# Patient Record
Sex: FEMALE | Race: WHITE
[De-identification: names, ages, dates, MRNs, and addresses within clinical notes are randomized per-mention and may not be internally consistent; named-entity substitution may affect disease eponyms.]

---

## 2018-02-14 ENCOUNTER — HOSPITAL ENCOUNTER (OUTPATIENT)
Age: 58
End: 2018-02-14
Payer: COMMERCIAL

## 2018-02-14 DIAGNOSIS — N95.1: ICD-10-CM

## 2018-02-14 DIAGNOSIS — Z12.4: Primary | ICD-10-CM

## 2018-02-14 LAB — PROGEST SERPL-MCNC: 0.11 NG/ML

## 2018-02-14 PROCEDURE — 36415 COLL VENOUS BLD VENIPUNCTURE: CPT

## 2018-02-14 PROCEDURE — 84144 ASSAY OF PROGESTERONE: CPT

## 2018-02-14 PROCEDURE — 88175 CYTOPATH C/V AUTO FLUID REDO: CPT

## 2018-02-14 PROCEDURE — 82670 ASSAY OF TOTAL ESTRADIOL: CPT

## 2018-02-14 PROCEDURE — G0145 SCR C/V CYTO,THINLAYER,RESCR: HCPCS

## 2018-03-12 ENCOUNTER — HOSPITAL ENCOUNTER (OUTPATIENT)
Age: 58
End: 2018-03-12
Payer: COMMERCIAL

## 2018-03-12 DIAGNOSIS — Z12.31: Primary | ICD-10-CM

## 2018-03-12 PROCEDURE — 77063 BREAST TOMOSYNTHESIS BI: CPT

## 2018-03-12 PROCEDURE — 77067 SCR MAMMO BI INCL CAD: CPT

## 2018-04-05 ENCOUNTER — HOSPITAL ENCOUNTER (OUTPATIENT)
Age: 58
End: 2018-04-05
Payer: COMMERCIAL

## 2018-04-05 DIAGNOSIS — Z80.41: ICD-10-CM

## 2018-04-05 DIAGNOSIS — R92.2: Primary | ICD-10-CM

## 2018-04-05 PROCEDURE — A4216 STERILE WATER/SALINE, 10 ML: HCPCS

## 2018-04-05 PROCEDURE — 77059: CPT

## 2018-04-05 PROCEDURE — C8908 MRI W/O FOL W/CONT, BREAST,: HCPCS

## 2018-04-05 PROCEDURE — A9585 GADOBUTROL INJECTION: HCPCS

## 2019-04-11 ENCOUNTER — HOSPITAL ENCOUNTER (OUTPATIENT)
Age: 59
End: 2019-04-11
Payer: COMMERCIAL

## 2019-04-11 DIAGNOSIS — Z12.31: Primary | ICD-10-CM

## 2019-04-11 PROCEDURE — 77067 SCR MAMMO BI INCL CAD: CPT

## 2019-04-11 PROCEDURE — 77063 BREAST TOMOSYNTHESIS BI: CPT

## 2020-07-29 ENCOUNTER — HOSPITAL ENCOUNTER (OUTPATIENT)
Age: 60
End: 2020-07-29
Payer: COMMERCIAL

## 2020-07-29 DIAGNOSIS — Z12.31: Primary | ICD-10-CM

## 2020-07-29 PROCEDURE — 77063 BREAST TOMOSYNTHESIS BI: CPT

## 2020-07-29 PROCEDURE — 77067 SCR MAMMO BI INCL CAD: CPT

## 2020-08-20 ENCOUNTER — HOSPITAL ENCOUNTER (OUTPATIENT)
Age: 60
End: 2020-08-20
Payer: COMMERCIAL

## 2020-08-20 DIAGNOSIS — Z78.0: Primary | ICD-10-CM

## 2020-08-20 PROCEDURE — 77080 DXA BONE DENSITY AXIAL: CPT

## 2021-08-11 ENCOUNTER — HOSPITAL ENCOUNTER (OUTPATIENT)
Age: 61
End: 2021-08-11
Payer: COMMERCIAL

## 2021-08-11 DIAGNOSIS — Z12.31: Primary | ICD-10-CM

## 2021-08-11 PROCEDURE — 77063 BREAST TOMOSYNTHESIS BI: CPT

## 2021-08-11 PROCEDURE — 77067 SCR MAMMO BI INCL CAD: CPT

## 2022-09-27 ENCOUNTER — HOSPITAL ENCOUNTER (OUTPATIENT)
Dept: HOSPITAL 100 - OPBD | Age: 62
Discharge: HOME | End: 2022-09-27
Payer: COMMERCIAL

## 2022-09-27 DIAGNOSIS — M85.89: ICD-10-CM

## 2022-09-27 DIAGNOSIS — Z78.0: ICD-10-CM

## 2022-09-27 DIAGNOSIS — M81.0: Primary | ICD-10-CM

## 2022-09-27 DIAGNOSIS — Z12.31: ICD-10-CM

## 2022-09-27 PROCEDURE — 77063 BREAST TOMOSYNTHESIS BI: CPT

## 2022-09-27 PROCEDURE — 77067 SCR MAMMO BI INCL CAD: CPT

## 2022-09-27 PROCEDURE — 77080 DXA BONE DENSITY AXIAL: CPT

## 2023-02-10 PROBLEM — S83.241A TEAR OF MEDIAL MENISCUS OF RIGHT KNEE: Status: ACTIVE | Noted: 2023-02-10

## 2023-02-10 PROBLEM — F32.1 DEPRESSION, MAJOR, SINGLE EPISODE, MODERATE (MULTI): Status: ACTIVE | Noted: 2023-02-10

## 2023-02-10 PROBLEM — M54.12 CERVICAL RADICULITIS: Status: ACTIVE | Noted: 2023-02-10

## 2023-02-10 PROBLEM — N63.0 BREAST LUMP OR MASS: Status: ACTIVE | Noted: 2023-02-10

## 2023-02-10 PROBLEM — J30.9 CHRONIC ALLERGIC RHINITIS: Status: ACTIVE | Noted: 2023-02-10

## 2023-02-10 PROBLEM — M81.0 OSTEOPOROSIS: Status: ACTIVE | Noted: 2023-02-10

## 2023-02-10 PROBLEM — R07.2 PRECORDIAL PAIN: Status: ACTIVE | Noted: 2023-02-10

## 2023-02-10 PROBLEM — I10 BENIGN HYPERTENSION: Status: ACTIVE | Noted: 2023-02-10

## 2023-02-10 PROBLEM — K21.9 GERD (GASTROESOPHAGEAL REFLUX DISEASE): Status: ACTIVE | Noted: 2023-02-10

## 2023-02-10 PROBLEM — K90.9 STEATORRHEA (HHS-HCC): Status: ACTIVE | Noted: 2023-02-10

## 2023-02-10 PROBLEM — R31.9 HEMATURIA: Status: ACTIVE | Noted: 2023-02-10

## 2023-02-10 PROBLEM — K58.0 IRRITABLE BOWEL SYNDROME WITH DIARRHEA: Status: ACTIVE | Noted: 2023-02-10

## 2023-02-10 PROBLEM — I34.1 MVP (MITRAL VALVE PROLAPSE): Status: ACTIVE | Noted: 2023-02-10

## 2023-02-10 PROBLEM — M47.812 CERVICAL SPONDYLOSIS WITHOUT MYELOPATHY: Status: ACTIVE | Noted: 2023-02-10

## 2023-02-10 PROBLEM — M50.20 DISPLACEMENT OF CERVICAL INTERVERTEBRAL DISC WITHOUT MYELOPATHY: Status: ACTIVE | Noted: 2023-02-10

## 2023-02-10 PROBLEM — M70.61 GREATER TROCHANTERIC BURSITIS OF RIGHT HIP: Status: ACTIVE | Noted: 2023-02-10

## 2023-02-10 PROBLEM — M25.551 HIP PAIN, RIGHT: Status: ACTIVE | Noted: 2023-02-10

## 2023-02-10 PROBLEM — G47.00 INSOMNIA: Status: ACTIVE | Noted: 2023-02-10

## 2023-02-10 PROBLEM — M75.41 IMPINGEMENT SYNDROME OF RIGHT SHOULDER: Status: ACTIVE | Noted: 2023-02-10

## 2023-02-10 PROBLEM — M75.22 BICEPS TENDINITIS OF LEFT UPPER EXTREMITY: Status: ACTIVE | Noted: 2023-02-10

## 2023-02-10 PROBLEM — M25.512 LEFT SHOULDER PAIN: Status: ACTIVE | Noted: 2023-02-10

## 2023-02-10 PROBLEM — M25.562 ACUTE PAIN OF LEFT KNEE: Status: ACTIVE | Noted: 2023-02-10

## 2023-02-10 PROBLEM — M15.9 GENERALIZED OSTEOARTHRITIS OF MULTIPLE SITES: Status: ACTIVE | Noted: 2023-02-10

## 2023-02-10 PROBLEM — M54.50 LOW BACK PAIN: Status: ACTIVE | Noted: 2023-02-10

## 2023-02-10 PROBLEM — M54.2 CERVICALGIA: Status: ACTIVE | Noted: 2023-02-10

## 2023-02-10 PROBLEM — M47.816 LUMBAR SPONDYLOSIS: Status: ACTIVE | Noted: 2023-02-10

## 2023-02-10 PROBLEM — I78.1 SPIDER VEINS OF LIMB: Status: ACTIVE | Noted: 2023-02-10

## 2023-02-10 PROBLEM — E03.9 HYPOTHYROIDISM: Status: ACTIVE | Noted: 2023-02-10

## 2023-02-10 PROBLEM — M79.10 MYALGIA: Status: ACTIVE | Noted: 2023-02-10

## 2023-02-10 PROBLEM — M67.52 SYNOVIAL PLICA SYNDROME OF LEFT KNEE: Status: ACTIVE | Noted: 2023-02-10

## 2023-02-10 PROBLEM — F41.9 ANXIETY: Status: ACTIVE | Noted: 2023-02-10

## 2023-02-10 PROBLEM — R10.12 ABDOMINAL PAIN, ACUTE, LEFT UPPER QUADRANT: Status: ACTIVE | Noted: 2023-02-10

## 2023-02-10 PROBLEM — G89.29 OTHER CHRONIC PAIN: Status: ACTIVE | Noted: 2023-02-10

## 2023-02-10 PROBLEM — C44.91 BASAL CELL CARCINOMA: Status: ACTIVE | Noted: 2023-02-10

## 2023-02-10 PROBLEM — R30.0 BURNING WITH URINATION: Status: ACTIVE | Noted: 2023-02-10

## 2023-02-10 RX ORDER — CALCIUM CARBONATE/VITAMIN D3 600MG-5MCG
1 TABLET ORAL DAILY
COMMUNITY
Start: 2019-12-02

## 2023-02-10 RX ORDER — IBANDRONATE SODIUM 150 MG/1
150 TABLET, FILM COATED ORAL
COMMUNITY
Start: 2022-10-18 | End: 2023-09-19 | Stop reason: SDUPTHER

## 2023-02-10 RX ORDER — ELECTROLYTES/DEXTROSE
5 SOLUTION, ORAL ORAL DAILY
COMMUNITY

## 2023-02-10 RX ORDER — MELOXICAM 15 MG/1
15 TABLET ORAL DAILY
COMMUNITY
Start: 2021-02-10 | End: 2023-03-14 | Stop reason: SDUPTHER

## 2023-02-10 RX ORDER — HYDROCODONE BITARTRATE AND ACETAMINOPHEN 5; 325 MG/1; MG/1
1 TABLET ORAL 3 TIMES DAILY PRN
COMMUNITY
Start: 2019-10-10 | End: 2023-03-28 | Stop reason: SDUPTHER

## 2023-02-10 RX ORDER — LISINOPRIL 5 MG/1
5 TABLET ORAL DAILY
COMMUNITY
Start: 2020-10-20 | End: 2023-06-22 | Stop reason: SDUPTHER

## 2023-02-10 RX ORDER — BACLOFEN 10 MG/1
10 TABLET ORAL 3 TIMES DAILY
COMMUNITY
Start: 2021-10-21 | End: 2024-01-04 | Stop reason: SDUPTHER

## 2023-02-10 RX ORDER — ZOLPIDEM TARTRATE 5 MG/1
5 TABLET ORAL NIGHTLY PRN
COMMUNITY
Start: 2019-10-30 | End: 2023-03-28 | Stop reason: SDUPTHER

## 2023-02-10 RX ORDER — MULTIVITAMIN
1 TABLET ORAL DAILY
COMMUNITY
Start: 2019-12-02

## 2023-02-10 RX ORDER — LEVOTHYROXINE SODIUM 100 UG/1
100 TABLET ORAL DAILY
COMMUNITY
Start: 2019-10-02 | End: 2023-06-22 | Stop reason: SDUPTHER

## 2023-02-10 RX ORDER — NALOXONE HYDROCHLORIDE 4 MG/.1ML
4 SPRAY NASAL AS NEEDED
COMMUNITY
Start: 2020-04-27

## 2023-03-14 ENCOUNTER — TELEPHONE (OUTPATIENT)
Dept: PRIMARY CARE | Facility: CLINIC | Age: 63
End: 2023-03-14
Payer: COMMERCIAL

## 2023-03-14 DIAGNOSIS — M25.551 HIP PAIN, RIGHT: ICD-10-CM

## 2023-03-14 RX ORDER — SERTRALINE HYDROCHLORIDE 50 MG/1
50 TABLET, FILM COATED ORAL DAILY
COMMUNITY
Start: 2022-04-04 | End: 2023-04-11 | Stop reason: ALTCHOICE

## 2023-03-14 RX ORDER — PANTOPRAZOLE SODIUM 40 MG/1
1 TABLET, DELAYED RELEASE ORAL DAILY
COMMUNITY
Start: 2022-03-25 | End: 2023-04-11 | Stop reason: ALTCHOICE

## 2023-03-14 RX ORDER — MINERAL OIL
1 ENEMA (ML) RECTAL DAILY PRN
COMMUNITY

## 2023-03-14 RX ORDER — RIFAXIMIN 550 MG/1
550 TABLET ORAL 3 TIMES DAILY
COMMUNITY
Start: 2022-10-18 | End: 2023-08-02 | Stop reason: ALTCHOICE

## 2023-03-20 RX ORDER — MELOXICAM 15 MG/1
15 TABLET ORAL DAILY
Qty: 30 TABLET | Refills: 2 | Status: SHIPPED | OUTPATIENT
Start: 2023-03-20 | End: 2023-06-08 | Stop reason: SDUPTHER

## 2023-03-28 ENCOUNTER — TELEPHONE (OUTPATIENT)
Dept: PRIMARY CARE | Facility: CLINIC | Age: 63
End: 2023-03-28
Payer: COMMERCIAL

## 2023-03-28 DIAGNOSIS — G47.00 INSOMNIA, UNSPECIFIED TYPE: ICD-10-CM

## 2023-03-28 DIAGNOSIS — M54.2 CERVICALGIA: ICD-10-CM

## 2023-03-28 RX ORDER — ZOLPIDEM TARTRATE 5 MG/1
5 TABLET ORAL NIGHTLY PRN
Qty: 30 TABLET | Refills: 0 | Status: SHIPPED | OUTPATIENT
Start: 2023-03-28 | End: 2023-04-24 | Stop reason: SDUPTHER

## 2023-03-28 RX ORDER — HYDROCODONE BITARTRATE AND ACETAMINOPHEN 5; 325 MG/1; MG/1
1 TABLET ORAL 3 TIMES DAILY PRN
Qty: 90 TABLET | Refills: 0 | Status: SHIPPED | OUTPATIENT
Start: 2023-03-31 | End: 2023-03-29 | Stop reason: SDUPTHER

## 2023-03-28 NOTE — TELEPHONE ENCOUNTER
Pharmacy called to report that Norco is on back order- would you like to prescribe something else or send to a different pharmacy?

## 2023-03-29 ENCOUNTER — TELEPHONE (OUTPATIENT)
Dept: PRIMARY CARE | Facility: CLINIC | Age: 63
End: 2023-03-29
Payer: COMMERCIAL

## 2023-03-29 DIAGNOSIS — M54.2 CERVICALGIA: ICD-10-CM

## 2023-03-29 RX ORDER — HYDROCODONE BITARTRATE AND ACETAMINOPHEN 5; 325 MG/1; MG/1
1 TABLET ORAL 3 TIMES DAILY PRN
Qty: 90 TABLET | Refills: 0 | Status: SHIPPED | OUTPATIENT
Start: 2023-03-31 | End: 2023-04-28 | Stop reason: SDUPTHER

## 2023-04-11 ENCOUNTER — OFFICE VISIT (OUTPATIENT)
Dept: PRIMARY CARE | Facility: CLINIC | Age: 63
End: 2023-04-11
Payer: COMMERCIAL

## 2023-04-11 VITALS
SYSTOLIC BLOOD PRESSURE: 130 MMHG | HEIGHT: 64 IN | BODY MASS INDEX: 22.53 KG/M2 | OXYGEN SATURATION: 98 % | HEART RATE: 72 BPM | DIASTOLIC BLOOD PRESSURE: 80 MMHG | WEIGHT: 132 LBS

## 2023-04-11 DIAGNOSIS — I10 BENIGN HYPERTENSION: ICD-10-CM

## 2023-04-11 DIAGNOSIS — F41.9 ANXIETY: Primary | ICD-10-CM

## 2023-04-11 DIAGNOSIS — K21.9 GASTROESOPHAGEAL REFLUX DISEASE WITHOUT ESOPHAGITIS: ICD-10-CM

## 2023-04-11 DIAGNOSIS — M54.12 CERVICAL RADICULITIS: ICD-10-CM

## 2023-04-11 PROCEDURE — 1036F TOBACCO NON-USER: CPT | Performed by: FAMILY MEDICINE

## 2023-04-11 PROCEDURE — 3079F DIAST BP 80-89 MM HG: CPT | Performed by: FAMILY MEDICINE

## 2023-04-11 PROCEDURE — 3075F SYST BP GE 130 - 139MM HG: CPT | Performed by: FAMILY MEDICINE

## 2023-04-11 PROCEDURE — 99214 OFFICE O/P EST MOD 30 MIN: CPT | Performed by: FAMILY MEDICINE

## 2023-04-11 ASSESSMENT — PATIENT HEALTH QUESTIONNAIRE - PHQ9
2. FEELING DOWN, DEPRESSED OR HOPELESS: SEVERAL DAYS
SUM OF ALL RESPONSES TO PHQ9 QUESTIONS 1 AND 2: 1
1. LITTLE INTEREST OR PLEASURE IN DOING THINGS: NOT AT ALL

## 2023-04-11 NOTE — PATIENT INSTRUCTIONS
Try Nexium 20 for a couple weeks to see if it helps the burping     The chest pain is costochondritis and is not the heart

## 2023-04-11 NOTE — PROGRESS NOTES
Subjective   Patient ID: Caitlin Hogan is a 62 y.o. female who presents for Med Management (Burping a lot, low back left side pain).    HPI   IBS/ Steatorrhea - Celiac panel negative. Bowels are still messed up in that she has loose stools in the AM up to 5 per day. Cramps Coffee will cause this. CT was normal except for the diverticulosis. Scope in 2017. No blood. Cologuard was negative. Getting fiber. Bloated and cramping lower abdomen. Aggravated by Zithromax for the URI. She saw Dr Gonsales and has started Papaya. Did not help. But doing better now. Rifaximin for a couple weeks did help but still comes and goes. She was on Protonix per Dr Lorenz. Helped the stomach feel some better. Lots of stress with brother dying and being executor. A sister  and another brother has cancer. She would like to get the colonoscopy now.     Anxiety and insomnia has been up with above and kids overseas with . Still daily zolpidem. Helps along with Melatonin. Helps to shut down Several family members with cancer in the last 5 years of bladder, colon and ovary so has concerns for herself. All were around 60 years old. Several with colon cancer. She is tired a lot for months. Mentally exhausted. Loud at work and does not feel she is getting support with difficult kids . Was on Wellbutrin but did not help. Feels she has been losing weight. Down 7 pound over a year and now stable.  Feels this contributes to the neck pain.     Basal cell carcinoma of nose - no skin lesions. Dr Harris booth a couple. Annual visits. Has a few he is observing. Chemo cream now for spots on chest and face.     Biceps tendonitis on left - still pain when she lays on it.     Cervicalgia - she is back at work and worse. Also with traveling. Had injections multiple times. She was miserable at the last visit. MICHELE helped for 3 weeks.   Painful at end of day and headaches. Has had therapy and injections. Bike riding aggravates. Pain to shoulder on  left. MRI in 2017 showed moderate C5 foramen stenosis. Otherwise mild steniosis at multiple levels.  Has been to Dr White for injections several years ago. And then to Dr Doe and had injection in 2019. She is to have a series of injections based on MRI in 2021 showing again multilevel cervical spondylosis and stenosis with Dr Cano   A recent MRI on 2/14/23 did not show anything more than moderate stenosis at C5.  Did one injection and ablation. They have started to discuss  surgery. Has had therapy several times through the years with not relief. . Wants to try to decrease her meds so did drop back to 3 per day. Pain level 2-10. Usually at 4 - 5 and drops 2 for 4-5 hours. Allows her to do house and lawn and garden.  And walking.  Would like to ride a motorcycle, gardening, ride longer distances,  kayak and gardening. Has lost muscle mass and concerned.     Chronic rhinitis - running nose when outside is cold and with pollen in summer. Allegra helps.    Depressive disorder - worries as above. No meds. But will treat the anxiety. Sertraline 25 does not feel like it is doing anything so went off of it. She is getting forgetful. PHQ- 9 score of 5 last year. Not suicidal. Feels she need to see a counselor and will consider when less active.     Family history of multiple cancers in sibling. So she had genetics eval with Dr Munoz and no inherited disorder.     Generalized osteoarthrosis, involving multiple sites -Knee feels like the patella scrapes at times. Pain in the right hip over greater trochanter. Has had the bursa removed. Did help but now pain again and not sure if the bursa recurrence or from the back. All positional and activity related. Not every day. Was with Dr Tenorio. She is to retire this year.     Hypothyroidism NOS - Good TSH in September on current dose     Insomnia - gets to sleep and stays asleep for 6 hours Ambien and back on Melatonin. Wakes with less anxiety     Low back pain worse now that  she is active again and driving. Worse after mowing.  Pain down the left leg. She says she walked 2 miles. Has been about a year XRay unchanged. . Has not  been to therapy.  so may now be able to get the MRI . Intermittent pain Norco for pain helps some. No numbness or weakness. Mid back hurts after standing. Dr Cano is not addressing this at present. So will discuss this one as well      Lump or mass in breast - good Mammogram in September. Also Pap last fall. Had dysuria that went away as did the blood.     Menopause - no hot flashes.     MVP (mitral valve prolapse) - Fatigue in the chest at times better with BP control. Not pain. Short of breath at times. Flutters at times with anxiety. . No edema.     Osteopenia - on DEXA 9/29/22. She is on Vitamin D and Calcium. Not able to get Prolia. FRAX is 15/6%. Ibandronate now.     Pancreatitis - nothing recent. Buts ays she has  been belching a lot lately and has a pain in left chest when she tips to the side. No heartburn.  No dysphagia..  No blood in stools. Will try Nexium     Spider veins of limb - not sensitive     Tear of medial meniscus of right knee -Dr Tenorio. Still needs to address as more bothersome. Also has a plica and will try Voltaren Gel.     Former smoker.     Left foot pain top with walking. No deformity     I have personally reviewed the patients OARRS report. . This report is filed in the EHR. I have considered the risks of abuse, addiction and diversion. I believe it is clinically appropriate to continue to prescribe this medication. April 23.     UDS 10/20/20 No Norco as cutting back 12/9/21 as expected for medication profile1/13/23 as expected for medication profile   1  CSA 1/13/23for Ambien  CSA 4/11/23  for Norco   ORT 4/27/20 score 2  Mammogram 9/27/22  scope 3/1/17 and with [facility] should go 5 years in 2022. Cologuard 8/4/21 negative     Review of Systems    Objective   /80 (BP Location: Left arm, Patient Position: Sitting)   Pulse 72    "Ht 1.626 m (5' 4\")   Wt 59.9 kg (132 lb)   SpO2 98%   BMI 22.66 kg/m²     Physical Exam  Vitals reviewed.   Constitutional:       Appearance: Normal appearance.   HENT:      Head: Normocephalic.   Eyes:      Extraocular Movements: Extraocular movements intact.      Conjunctiva/sclera: Conjunctivae normal.      Pupils: Pupils are equal, round, and reactive to light.   Neck:      Vascular: No carotid bruit.   Cardiovascular:      Rate and Rhythm: Normal rate and regular rhythm.      Heart sounds: No murmur heard.  Pulmonary:      Effort: Pulmonary effort is normal.      Breath sounds: Normal breath sounds.   Chest:      Chest wall: No tenderness (left upper chest wall anteriorly).   Abdominal:      General: Abdomen is flat. Bowel sounds are normal.      Palpations: Abdomen is soft.      Tenderness: There is abdominal tenderness (epigastrium). There is no guarding.   Musculoskeletal:      Cervical back: Normal range of motion and neck supple. No tenderness.      Comments: Low back with tender muscles but good ROM  Neck with good ROM but tender and tight traps and paracervical  Mid  tight muscles      Lymphadenopathy:      Cervical: No cervical adenopathy.   Neurological:      Mental Status: She is alert and oriented to person, place, and time.   Psychiatric:         Mood and Affect: Mood normal.         Behavior: Behavior normal.         Thought Content: Thought content normal.         Judgment: Judgment normal.         Assessment/Plan          "

## 2023-04-24 DIAGNOSIS — G47.00 INSOMNIA, UNSPECIFIED TYPE: ICD-10-CM

## 2023-04-24 RX ORDER — ZOLPIDEM TARTRATE 5 MG/1
5 TABLET ORAL NIGHTLY PRN
Qty: 30 TABLET | Refills: 0 | Status: SHIPPED | OUTPATIENT
Start: 2023-04-24 | End: 2023-05-24 | Stop reason: SDUPTHER

## 2023-04-28 ENCOUNTER — TELEPHONE (OUTPATIENT)
Dept: PRIMARY CARE | Facility: CLINIC | Age: 63
End: 2023-04-28
Payer: COMMERCIAL

## 2023-04-28 DIAGNOSIS — M54.2 CERVICALGIA: ICD-10-CM

## 2023-04-28 RX ORDER — HYDROCODONE BITARTRATE AND ACETAMINOPHEN 5; 325 MG/1; MG/1
1 TABLET ORAL 3 TIMES DAILY PRN
Qty: 90 TABLET | Refills: 0 | Status: SHIPPED | OUTPATIENT
Start: 2023-04-28 | End: 2023-05-24 | Stop reason: SDUPTHER

## 2023-05-24 DIAGNOSIS — G47.00 INSOMNIA, UNSPECIFIED TYPE: ICD-10-CM

## 2023-05-24 DIAGNOSIS — M54.2 CERVICALGIA: ICD-10-CM

## 2023-05-24 RX ORDER — ZOLPIDEM TARTRATE 5 MG/1
5 TABLET ORAL NIGHTLY PRN
Qty: 30 TABLET | Refills: 0 | Status: SHIPPED | OUTPATIENT
Start: 2023-05-24 | End: 2023-06-22 | Stop reason: SDUPTHER

## 2023-05-24 RX ORDER — HYDROCODONE BITARTRATE AND ACETAMINOPHEN 5; 325 MG/1; MG/1
1 TABLET ORAL 3 TIMES DAILY PRN
Qty: 90 TABLET | Refills: 0 | Status: SHIPPED | OUTPATIENT
Start: 2023-05-24 | End: 2023-06-22 | Stop reason: SDUPTHER

## 2023-06-05 DIAGNOSIS — M25.551 HIP PAIN, RIGHT: Primary | ICD-10-CM

## 2023-06-05 RX ORDER — MELOXICAM 15 MG/1
TABLET ORAL
Qty: 30 TABLET | Refills: 2 | OUTPATIENT
Start: 2023-06-05

## 2023-06-08 RX ORDER — MELOXICAM 15 MG/1
15 TABLET ORAL DAILY
Qty: 30 TABLET | Refills: 2 | Status: SHIPPED | OUTPATIENT
Start: 2023-06-08 | End: 2023-09-06 | Stop reason: SDUPTHER

## 2023-06-22 DIAGNOSIS — E03.9 HYPOTHYROIDISM, UNSPECIFIED TYPE: ICD-10-CM

## 2023-06-22 DIAGNOSIS — G47.00 INSOMNIA, UNSPECIFIED TYPE: ICD-10-CM

## 2023-06-22 DIAGNOSIS — I10 BENIGN HYPERTENSION: ICD-10-CM

## 2023-06-22 DIAGNOSIS — M54.2 CERVICALGIA: ICD-10-CM

## 2023-06-22 RX ORDER — ZOLPIDEM TARTRATE 5 MG/1
5 TABLET ORAL NIGHTLY PRN
Qty: 30 TABLET | Refills: 0 | Status: SHIPPED | OUTPATIENT
Start: 2023-06-22 | End: 2023-07-25 | Stop reason: SDUPTHER

## 2023-06-22 RX ORDER — LEVOTHYROXINE SODIUM 100 UG/1
100 TABLET ORAL DAILY
Qty: 90 TABLET | Refills: 1 | Status: SHIPPED | OUTPATIENT
Start: 2023-06-22 | End: 2024-01-12 | Stop reason: SDUPTHER

## 2023-06-22 RX ORDER — HYDROCODONE BITARTRATE AND ACETAMINOPHEN 5; 325 MG/1; MG/1
1 TABLET ORAL 3 TIMES DAILY PRN
Qty: 90 TABLET | Refills: 0 | Status: SHIPPED | OUTPATIENT
Start: 2023-06-22 | End: 2023-07-21 | Stop reason: SDUPTHER

## 2023-06-22 RX ORDER — LISINOPRIL 5 MG/1
5 TABLET ORAL DAILY
Qty: 90 TABLET | Refills: 1 | Status: SHIPPED | OUTPATIENT
Start: 2023-06-22 | End: 2024-01-12 | Stop reason: SDUPTHER

## 2023-06-30 ENCOUNTER — HOSPITAL ENCOUNTER (OUTPATIENT)
Dept: DATA CONVERSION | Facility: HOSPITAL | Age: 63
End: 2023-06-30
Attending: PAIN MEDICINE | Admitting: PAIN MEDICINE
Payer: COMMERCIAL

## 2023-06-30 DIAGNOSIS — M81.0 AGE-RELATED OSTEOPOROSIS WITHOUT CURRENT PATHOLOGICAL FRACTURE: ICD-10-CM

## 2023-06-30 DIAGNOSIS — Z85.828 PERSONAL HISTORY OF OTHER MALIGNANT NEOPLASM OF SKIN: ICD-10-CM

## 2023-06-30 DIAGNOSIS — M54.50 LOW BACK PAIN, UNSPECIFIED: ICD-10-CM

## 2023-06-30 DIAGNOSIS — R52 PAIN, UNSPECIFIED: ICD-10-CM

## 2023-06-30 DIAGNOSIS — F41.9 ANXIETY DISORDER, UNSPECIFIED: ICD-10-CM

## 2023-06-30 DIAGNOSIS — G47.00 INSOMNIA, UNSPECIFIED: ICD-10-CM

## 2023-06-30 DIAGNOSIS — M51.16 INTERVERTEBRAL DISC DISORDERS WITH RADICULOPATHY, LUMBAR REGION: ICD-10-CM

## 2023-06-30 DIAGNOSIS — Z90.710 ACQUIRED ABSENCE OF BOTH CERVIX AND UTERUS: ICD-10-CM

## 2023-06-30 DIAGNOSIS — I34.1 NONRHEUMATIC MITRAL (VALVE) PROLAPSE: ICD-10-CM

## 2023-06-30 DIAGNOSIS — M48.062 SPINAL STENOSIS, LUMBAR REGION WITH NEUROGENIC CLAUDICATION: ICD-10-CM

## 2023-06-30 DIAGNOSIS — M54.16 RADICULOPATHY, LUMBAR REGION: ICD-10-CM

## 2023-06-30 DIAGNOSIS — F32.A DEPRESSION, UNSPECIFIED: ICD-10-CM

## 2023-06-30 DIAGNOSIS — Z87.891 PERSONAL HISTORY OF NICOTINE DEPENDENCE: ICD-10-CM

## 2023-06-30 DIAGNOSIS — I10 ESSENTIAL (PRIMARY) HYPERTENSION: ICD-10-CM

## 2023-06-30 DIAGNOSIS — M51.36 OTHER INTERVERTEBRAL DISC DEGENERATION, LUMBAR REGION: ICD-10-CM

## 2023-06-30 DIAGNOSIS — M79.10 MYALGIA, UNSPECIFIED SITE: ICD-10-CM

## 2023-06-30 DIAGNOSIS — K21.9 GASTRO-ESOPHAGEAL REFLUX DISEASE WITHOUT ESOPHAGITIS: ICD-10-CM

## 2023-07-21 DIAGNOSIS — M54.2 CERVICALGIA: ICD-10-CM

## 2023-07-21 RX ORDER — HYDROCODONE BITARTRATE AND ACETAMINOPHEN 5; 325 MG/1; MG/1
1 TABLET ORAL 3 TIMES DAILY PRN
Qty: 90 TABLET | Refills: 0 | Status: SHIPPED | OUTPATIENT
Start: 2023-07-23 | End: 2023-08-21 | Stop reason: SDUPTHER

## 2023-07-25 ENCOUNTER — APPOINTMENT (OUTPATIENT)
Dept: PRIMARY CARE | Facility: CLINIC | Age: 63
End: 2023-07-25
Payer: COMMERCIAL

## 2023-07-25 DIAGNOSIS — G47.00 INSOMNIA, UNSPECIFIED TYPE: ICD-10-CM

## 2023-07-25 RX ORDER — ZOLPIDEM TARTRATE 5 MG/1
5 TABLET ORAL NIGHTLY PRN
Qty: 30 TABLET | Refills: 0 | Status: SHIPPED | OUTPATIENT
Start: 2023-07-25 | End: 2023-08-21 | Stop reason: SDUPTHER

## 2023-08-02 ENCOUNTER — OFFICE VISIT (OUTPATIENT)
Dept: PRIMARY CARE | Facility: CLINIC | Age: 63
End: 2023-08-02
Payer: COMMERCIAL

## 2023-08-02 VITALS
DIASTOLIC BLOOD PRESSURE: 80 MMHG | OXYGEN SATURATION: 97 % | BODY MASS INDEX: 23.21 KG/M2 | SYSTOLIC BLOOD PRESSURE: 136 MMHG | TEMPERATURE: 98.1 F | WEIGHT: 131 LBS | HEART RATE: 106 BPM

## 2023-08-02 DIAGNOSIS — C44.311 BASAL CELL CARCINOMA (BCC) OF SKIN OF NOSE: ICD-10-CM

## 2023-08-02 DIAGNOSIS — F41.9 ANXIETY: Primary | ICD-10-CM

## 2023-08-02 DIAGNOSIS — M67.52 SYNOVIAL PLICA SYNDROME OF LEFT KNEE: ICD-10-CM

## 2023-08-02 DIAGNOSIS — M81.6 LOCALIZED OSTEOPOROSIS WITHOUT CURRENT PATHOLOGICAL FRACTURE: ICD-10-CM

## 2023-08-02 DIAGNOSIS — F32.1 DEPRESSION, MAJOR, SINGLE EPISODE, MODERATE (MULTI): ICD-10-CM

## 2023-08-02 DIAGNOSIS — I10 BENIGN HYPERTENSION: ICD-10-CM

## 2023-08-02 DIAGNOSIS — F51.04 PSYCHOPHYSIOLOGICAL INSOMNIA: ICD-10-CM

## 2023-08-02 DIAGNOSIS — M47.812 CERVICAL SPONDYLOSIS WITHOUT MYELOPATHY: ICD-10-CM

## 2023-08-02 DIAGNOSIS — K21.9 GASTROESOPHAGEAL REFLUX DISEASE WITHOUT ESOPHAGITIS: ICD-10-CM

## 2023-08-02 DIAGNOSIS — M15.9 GENERALIZED OSTEOARTHRITIS OF MULTIPLE SITES: ICD-10-CM

## 2023-08-02 DIAGNOSIS — J30.9 CHRONIC ALLERGIC RHINITIS: ICD-10-CM

## 2023-08-02 DIAGNOSIS — K58.0 IRRITABLE BOWEL SYNDROME WITH DIARRHEA: ICD-10-CM

## 2023-08-02 DIAGNOSIS — E03.9 ACQUIRED HYPOTHYROIDISM: ICD-10-CM

## 2023-08-02 PROBLEM — R31.9 HEMATURIA: Status: RESOLVED | Noted: 2023-02-10 | Resolved: 2023-08-02

## 2023-08-02 PROBLEM — M25.551 HIP PAIN, RIGHT: Status: RESOLVED | Noted: 2023-02-10 | Resolved: 2023-08-02

## 2023-08-02 PROBLEM — M25.512 LEFT SHOULDER PAIN: Status: RESOLVED | Noted: 2023-02-10 | Resolved: 2023-08-02

## 2023-08-02 PROBLEM — R07.2 PRECORDIAL PAIN: Status: RESOLVED | Noted: 2023-02-10 | Resolved: 2023-08-02

## 2023-08-02 PROBLEM — R30.0 BURNING WITH URINATION: Status: RESOLVED | Noted: 2023-02-10 | Resolved: 2023-08-02

## 2023-08-02 PROBLEM — M54.2 CERVICALGIA: Status: RESOLVED | Noted: 2023-02-10 | Resolved: 2023-08-02

## 2023-08-02 PROBLEM — R10.12 ABDOMINAL PAIN, ACUTE, LEFT UPPER QUADRANT: Status: RESOLVED | Noted: 2023-02-10 | Resolved: 2023-08-02

## 2023-08-02 PROBLEM — G89.29 OTHER CHRONIC PAIN: Status: RESOLVED | Noted: 2023-02-10 | Resolved: 2023-08-02

## 2023-08-02 PROBLEM — M47.816 LUMBAR SPONDYLOSIS: Status: RESOLVED | Noted: 2023-02-10 | Resolved: 2023-08-02

## 2023-08-02 PROBLEM — M79.10 MYALGIA: Status: RESOLVED | Noted: 2023-02-10 | Resolved: 2023-08-02

## 2023-08-02 PROBLEM — M54.50 LOW BACK PAIN: Status: RESOLVED | Noted: 2023-02-10 | Resolved: 2023-08-02

## 2023-08-02 PROBLEM — M50.20 DISPLACEMENT OF CERVICAL INTERVERTEBRAL DISC WITHOUT MYELOPATHY: Status: RESOLVED | Noted: 2023-02-10 | Resolved: 2023-08-02

## 2023-08-02 PROBLEM — M25.562 ACUTE PAIN OF LEFT KNEE: Status: RESOLVED | Noted: 2023-02-10 | Resolved: 2023-08-02

## 2023-08-02 PROBLEM — I34.1 MVP (MITRAL VALVE PROLAPSE): Status: RESOLVED | Noted: 2023-02-10 | Resolved: 2023-08-02

## 2023-08-02 PROBLEM — M70.61 GREATER TROCHANTERIC BURSITIS OF RIGHT HIP: Status: RESOLVED | Noted: 2023-02-10 | Resolved: 2023-08-02

## 2023-08-02 PROCEDURE — 3075F SYST BP GE 130 - 139MM HG: CPT | Performed by: FAMILY MEDICINE

## 2023-08-02 PROCEDURE — 1036F TOBACCO NON-USER: CPT | Performed by: FAMILY MEDICINE

## 2023-08-02 PROCEDURE — 3079F DIAST BP 80-89 MM HG: CPT | Performed by: FAMILY MEDICINE

## 2023-08-02 PROCEDURE — 99214 OFFICE O/P EST MOD 30 MIN: CPT | Performed by: FAMILY MEDICINE

## 2023-08-02 NOTE — PROGRESS NOTES
Subjective   Patient ID: Caitlin Hogan is a 62 y.o. female who presents for Med Management (Sore throat, congested).    HPI   IBS/ Steatorrhea - Celiac panel negative. Bowels are still messed up in that she has loose stools in the AM up to 5 per day. Cramps Coffee will cause this. CT was normal except for the diverticulosis. Scope in 2017. No blood. Cologuard was negative. Getting fiber. Bloated and cramping lower abdomen. Aggravated by Zithromax for the URI. She saw Dr Gonsales and has started Papaya. Did not help. But doing better now. Rifaximin for a couple weeks did help but still comes and goes. She was on Protonix per Dr Lorenz. Helped the stomach feel some better. Lots of stress with brother dying and being executor. A sister  and another brother has cancer. She had normal colonoscopy in February       Anxiety and insomnia has been up with above and LBP below and kids overseas with . Still daily zolpidem. Helps along with Melatonin. Helps to shut down. Several family members with cancer in the last 5 years of bladder, colon and ovary so has concerns for herself. All were around 60 years old. Several with colon cancer. She is tired a lot for months. Mentally exhausted. Loud at work and does not feel she is getting support with difficult kids . Was on Wellbutrin but did not help. Feels she has been losing weight. Down 7 pound over a year and now stable.  Feels this contributes to the neck pain. She does snore.  Has been to ENT. Checked for sleep apnea in past. Does not want testing at present      Basal cell carcinoma of nose - no skin lesions. Dr Harris booth a couple. Annual visits. Has a few he is observing. Chemo cream now for spots on chest and face.      Biceps tendonitis on left - still pain when she lays on it.      Cervicalgia - she is back at work and worse. Also with traveling. Had injections multiple times. She was miserable at the last visit. MICHELE helped for 3 weeks.   Painful at  end of day and headaches. Has had therapy and injections. Bike riding aggravates. Pain to shoulder on left. MRI in 2017 showed moderate C5 foramen stenosis. Otherwise mild steniosis at multiple levels.  Has been to Dr White for injections several years ago. And then to Dr Doe and had injection in 2019. She is to have a series of injections based on MRI in 2021 showing again multilevel cervical spondylosis and stenosis with Dr Cano   A recent MRI on 2/14/23 did not show anything more than moderate stenosis at C5.  Did one injection and ablation. They have started to discuss  surgery. Has had therapy several times through the years with not relief.  Pain level 2-10. Usually at 4  and drops 2 for 4-5 hours. Allows her to do house and lawn and garden.  And walking.  With the LBP below she is not able to function with the meds some days.  Would like to ride a motorcycle, gardening, ride longer distances,  kayak and gardening. Has lost muscle mass and concerned.      Chronic rhinitis - running nose when outside is cold and with pollen in summer. Allegra helps when taken as needed . Has been to ENT      Depressive disorder - worries as above. No meds. But will treat the anxiety. Sertraline 25 does not feel like it is doing anything so went off of it. She is getting forgetful. PHQ- 9 score of 5 last year. Not suicidal. Feels she need to see a counselor and will consider when less active.      Family history of multiple cancers in sibling. So she had genetics eval with Dr Munoz and no inherited disorder.      Generalized osteoarthrosis, involving multiple sites -Knee feels like the patella scrapes at times. Pain in the right hip over greater trochanter. Has had the bursa removed. Did help but now pain again and not sure if the bursa recurrence or from the back. All positional and activity related. Not every day. Was with Dr Tenorio. She is to retire in Sepember.      Hypothyroidism NOS - Good TSH in September on current  dose      Insomnia - gets to sleep and stays asleep for 6 hours Ambien and back on Melatonin. Wakes with less anxiety      Low back pain worse now that she is active again and driving. Worse after mowing.  Pain down the left leg. She says she walked 2 miles. Has been about a year.. Escalated since last visit. Did see Chelsea in Dr Cano's office. MRI showed herniated discs. Referred to dr Victor Hugo Friedman who did an injection with no relief. Now cannot get back in and so made appointment [facility].  Dr Dumont. Has not  been to therapy and told that it would not help. Norco  tid for pain helps some.  Pain from 2-9.  Tends to run at 4 and drops 4. No numbness or weakness. Mid back hurts after standing.     Lump or mass in breast - good Mammogram in September. Also Pap last fall. Had dysuria that went away as did the blood.      Menopause - no hot flashes.      MVP (mitral valve prolapse) - Fatigue in the chest at times better with BP control. Not pain. Short of breath at times. Flutters at times with anxiety. . No edema.      Osteopenia - on DEXA 9/29/22. She is on Vitamin D and Calcium. Not able to get Prolia. FRAX is 15/6%. Ibandronate now.      Pancreatitis - nothing recent. But says she has  been belching a lot lately and has a pain in left chest when she tips to the side. No heartburn.  No dysphagia..  No blood in stools.     Spider veins of limb - not sensitive      Tear of medial meniscus of right knee -Dr Tenorio. Still needs to address as more bothersome off and on. Also has a plica and will try Voltaren Gel.      Former smoker.      Left foot pain top with walking. No deformity      I have personally reviewed the patients OARRS report. . This report is filed in the EHR. I have considered the risks of abuse, addiction and diversion. I believe it is clinically appropriate to continue to prescribe this medication. April 23.      UDS 10/20/20 No Norco as cutting back 12/9/21 as expected for medication  profile1/13/23 as expected for medication profile   CSA 1/13/23for Ambien  CSA 4/11/23  for Norco   ORT 4/27/20 score 2  Mammogram 9/27/22  scope 2/22/23     Review of Systems    Objective   /80 (BP Location: Left arm, Patient Position: Sitting)   Pulse 106   Temp 36.7 °C (98.1 °F) (Oral)   Wt 59.4 kg (131 lb)   SpO2 97%   BMI 23.21 kg/m²     Physical Exam  Vitals reviewed.   Constitutional:       Appearance: Normal appearance.   HENT:      Head: Normocephalic.   Eyes:      Extraocular Movements: Extraocular movements intact.      Conjunctiva/sclera: Conjunctivae normal.      Pupils: Pupils are equal, round, and reactive to light.   Neck:      Vascular: No carotid bruit.   Cardiovascular:      Rate and Rhythm: Normal rate and regular rhythm.      Heart sounds: No murmur heard.  Pulmonary:      Effort: Pulmonary effort is normal.      Breath sounds: Normal breath sounds.   Chest:      Chest wall: No tenderness (left upper chest wall anteriorly).   Abdominal:      General: Abdomen is flat. Bowel sounds are normal.      Palpations: Abdomen is soft.      Tenderness: There is abdominal tenderness (epigastrium). There is no guarding.   Musculoskeletal:      Cervical back: Normal range of motion and neck supple. No tenderness.      Comments: Low back with tender muscles but good ROM  Neck with good ROM but tender and tight traps and paracervical  Mid  tight muscles      Lymphadenopathy:      Cervical: No cervical adenopathy.   Neurological:      Mental Status: She is alert and oriented to person, place, and time.   Psychiatric:         Mood and Affect: Mood normal.         Behavior: Behavior normal.         Thought Content: Thought content normal.         Judgment: Judgment normal.     Assessment/Plan   Problem List Items Addressed This Visit       Anxiety - Primary    Basal cell carcinoma    Benign hypertension    Cervical spondylosis without myelopathy    Chronic allergic rhinitis    Depression,  major, single episode, moderate (CMS/HCC)    Generalized osteoarthritis of multiple sites    GERD (gastroesophageal reflux disease)    Hypothyroidism    Relevant Orders    TSH with reflex to Free T4 if abnormal    Insomnia    Irritable bowel syndrome with diarrhea    Relevant Orders    Comprehensive Metabolic Panel    Osteoporosis    Synovial plica syndrome of left knee

## 2023-08-21 DIAGNOSIS — M54.2 CERVICALGIA: ICD-10-CM

## 2023-08-21 DIAGNOSIS — G47.00 INSOMNIA, UNSPECIFIED TYPE: ICD-10-CM

## 2023-08-21 RX ORDER — ZOLPIDEM TARTRATE 5 MG/1
5 TABLET ORAL NIGHTLY PRN
Qty: 30 TABLET | Refills: 0 | Status: SHIPPED | OUTPATIENT
Start: 2023-08-21 | End: 2023-09-19 | Stop reason: SDUPTHER

## 2023-08-21 RX ORDER — HYDROCODONE BITARTRATE AND ACETAMINOPHEN 5; 325 MG/1; MG/1
1 TABLET ORAL 3 TIMES DAILY PRN
Qty: 90 TABLET | Refills: 0 | Status: SHIPPED | OUTPATIENT
Start: 2023-08-21 | End: 2023-09-19 | Stop reason: SDUPTHER

## 2023-09-06 DIAGNOSIS — M25.551 HIP PAIN, RIGHT: ICD-10-CM

## 2023-09-06 RX ORDER — MELOXICAM 15 MG/1
15 TABLET ORAL DAILY
Qty: 30 TABLET | Refills: 2 | Status: SHIPPED | OUTPATIENT
Start: 2023-09-06 | End: 2023-09-19 | Stop reason: SDUPTHER

## 2023-09-11 DIAGNOSIS — Z12.31 SCREENING MAMMOGRAM, ENCOUNTER FOR: Primary | ICD-10-CM

## 2023-09-19 DIAGNOSIS — M81.6 LOCALIZED OSTEOPOROSIS WITHOUT CURRENT PATHOLOGICAL FRACTURE: ICD-10-CM

## 2023-09-19 DIAGNOSIS — M25.551 HIP PAIN, RIGHT: ICD-10-CM

## 2023-09-19 DIAGNOSIS — M54.2 CERVICALGIA: ICD-10-CM

## 2023-09-19 DIAGNOSIS — G47.00 INSOMNIA, UNSPECIFIED TYPE: ICD-10-CM

## 2023-09-19 RX ORDER — ZOLPIDEM TARTRATE 5 MG/1
5 TABLET ORAL NIGHTLY PRN
Qty: 30 TABLET | Refills: 0 | Status: SHIPPED | OUTPATIENT
Start: 2023-09-19 | End: 2023-10-17 | Stop reason: SDUPTHER

## 2023-09-19 RX ORDER — IBANDRONATE SODIUM 150 MG/1
150 TABLET, FILM COATED ORAL
Qty: 4 TABLET | Refills: 3 | Status: SHIPPED | OUTPATIENT
Start: 2023-09-19

## 2023-09-19 RX ORDER — MELOXICAM 15 MG/1
15 TABLET ORAL DAILY
Qty: 90 TABLET | Refills: 3 | Status: SHIPPED | OUTPATIENT
Start: 2023-09-19

## 2023-09-19 RX ORDER — HYDROCODONE BITARTRATE AND ACETAMINOPHEN 5; 325 MG/1; MG/1
1 TABLET ORAL 3 TIMES DAILY PRN
Qty: 90 TABLET | Refills: 0 | Status: SHIPPED | OUTPATIENT
Start: 2023-09-19 | End: 2023-10-17 | Stop reason: SDUPTHER

## 2023-09-25 ENCOUNTER — LAB (OUTPATIENT)
Dept: LAB | Facility: LAB | Age: 63
End: 2023-09-25
Payer: COMMERCIAL

## 2023-09-25 DIAGNOSIS — E03.9 ACQUIRED HYPOTHYROIDISM: ICD-10-CM

## 2023-09-25 DIAGNOSIS — K58.0 IRRITABLE BOWEL SYNDROME WITH DIARRHEA: ICD-10-CM

## 2023-09-25 LAB
ALANINE AMINOTRANSFERASE (SGPT) (U/L) IN SER/PLAS: 12 U/L (ref 7–45)
ALBUMIN (G/DL) IN SER/PLAS: 4.3 G/DL (ref 3.4–5)
ALKALINE PHOSPHATASE (U/L) IN SER/PLAS: 60 U/L (ref 33–136)
ANION GAP IN SER/PLAS: 11 MMOL/L (ref 10–20)
ASPARTATE AMINOTRANSFERASE (SGOT) (U/L) IN SER/PLAS: 13 U/L (ref 9–39)
BILIRUBIN TOTAL (MG/DL) IN SER/PLAS: 0.8 MG/DL (ref 0–1.2)
CALCIUM (MG/DL) IN SER/PLAS: 9.8 MG/DL (ref 8.6–10.3)
CARBON DIOXIDE, TOTAL (MMOL/L) IN SER/PLAS: 30 MMOL/L (ref 21–32)
CHLORIDE (MMOL/L) IN SER/PLAS: 102 MMOL/L (ref 98–107)
CREATININE (MG/DL) IN SER/PLAS: 0.81 MG/DL (ref 0.5–1.05)
GFR FEMALE: 82 ML/MIN/1.73M2
GLUCOSE (MG/DL) IN SER/PLAS: 133 MG/DL (ref 74–99)
POTASSIUM (MMOL/L) IN SER/PLAS: 3.9 MMOL/L (ref 3.5–5.3)
PROTEIN TOTAL: 6.6 G/DL (ref 6.4–8.2)
SODIUM (MMOL/L) IN SER/PLAS: 139 MMOL/L (ref 136–145)
THYROTROPIN (MIU/L) IN SER/PLAS BY DETECTION LIMIT <= 0.05 MIU/L: 1.15 MIU/L (ref 0.44–3.98)
UREA NITROGEN (MG/DL) IN SER/PLAS: 11 MG/DL (ref 6–23)

## 2023-09-25 PROCEDURE — 84443 ASSAY THYROID STIM HORMONE: CPT

## 2023-09-25 PROCEDURE — 36415 COLL VENOUS BLD VENIPUNCTURE: CPT

## 2023-09-25 PROCEDURE — 80053 COMPREHEN METABOLIC PANEL: CPT

## 2023-09-28 DIAGNOSIS — J01.90 ACUTE NON-RECURRENT SINUSITIS, UNSPECIFIED LOCATION: Primary | ICD-10-CM

## 2023-09-28 DIAGNOSIS — S83.241D TEAR OF MEDIAL MENISCUS OF RIGHT KNEE, UNSPECIFIED TEAR TYPE, UNSPECIFIED WHETHER OLD OR CURRENT TEAR, SUBSEQUENT ENCOUNTER: ICD-10-CM

## 2023-09-28 RX ORDER — DICLOFENAC SODIUM 10 MG/G
4 GEL TOPICAL 4 TIMES DAILY PRN
Qty: 100 G | Refills: 11 | Status: SHIPPED | OUTPATIENT
Start: 2023-09-28 | End: 2024-09-27

## 2023-09-28 RX ORDER — AMOXICILLIN AND CLAVULANATE POTASSIUM 875; 125 MG/1; MG/1
875 TABLET, FILM COATED ORAL 2 TIMES DAILY
Qty: 10 TABLET | Refills: 0 | Status: SHIPPED | OUTPATIENT
Start: 2023-09-28 | End: 2023-10-03

## 2023-09-28 RX ORDER — DICLOFENAC SODIUM 10 MG/G
100 GEL TOPICAL 4 TIMES DAILY PRN
COMMUNITY
Start: 2020-04-27 | End: 2023-09-28 | Stop reason: SDUPTHER

## 2023-09-29 VITALS — WEIGHT: 131.17 LBS | HEIGHT: 63 IN | BODY MASS INDEX: 23.24 KG/M2

## 2023-10-02 NOTE — OP NOTE
PROCEDURE DETAILS    Preoperative Diagnosis:  Radiculopathy, lumbar region, M54.16    Postoperative Diagnosis:  Radiculopathy, lumbar region, M54.16    Surgeon: Samuel Cano  Resident/Fellow/Other Assistant: None of these were associated with this case    Procedure:  1. L L4-L5 TFESI    Anesthesia: No anesthesiologist associated with this case  Estimated Blood Loss: 0  Findings: NA  Additional Details: The patient has a greater than 2-month history of severe low back and leg pain.  The patient has previously had 6 weeks of conservative management with exercise therapy  and medications.  The patient is compliant with home exercises for this issue.  The pain significantly interrupts the patient's physical function.  The patient was evaluated by neurosurgery who recommended this injection prior to proceeding with surgical  intervention.  Her imaging is notable for a left-sided herniation at L4-L5 resulting in left-sided neuroforaminal stenosis and compression of the left L4 nerve root which correlates with her symptoms.  She cannot stand for an hour due to the pain.        Operative Report:   Procedure: Left lumbar transforaminal epidural steroid injection under fluoroscopic guidance of the left L4 nerve root at the left L4-5 foramen  Diagnosis: Lumbar radiculopathy  Solution used: 1 ml of Kenalog 40mg, 3 ml normal saline, 1 ml lidocaine 2%, 5ml total. 2.5 mL per site  Anesthesia: Local  Complications: None    After informed consent was obtained, the patient was brought to the OR and placed in the prone position. The area in question was prepped and draped  in sterile fashion. An ipsilateral oblique fluoroscopic view of the lumbar spine was obtained and after 3ml of lidocaine 1% was injected into the skin, a 22-gauge Chiba needle was inserted into the skin and advanced to the 6 clock position beneath the  left L4 pedicle under intermittent fluoroscopic guidance. Proper needle position was confirmed via both  AP and lateral fluoroscopy.  1 mL Omnipaque was injected under live fluoroscopy which demonstrated appropriate epidural and nerve root uptake and the  absence of any intravascular or intrathecal spread. The local anesthetic steroid solution was then injected incrementally.  The needle was removed. Bleeding was nil. The patient tolerated the procedure well and was transferred to the recovery room in  good condition.                        Attestation:   Note Completion:  Attending Attestation I performed the procedure without a resident         Electronic Signatures:  Samuel Cano)  (Signed 30-Jun-2023 17:34)   Authored: Post-Operative Note, Chart Review, Note Completion      Last Updated: 30-Jun-2023 17:34 by Samuel Cano)

## 2023-10-17 DIAGNOSIS — M54.2 CERVICALGIA: ICD-10-CM

## 2023-10-17 DIAGNOSIS — M81.6 LOCALIZED OSTEOPOROSIS WITHOUT CURRENT PATHOLOGICAL FRACTURE: ICD-10-CM

## 2023-10-17 DIAGNOSIS — G47.00 INSOMNIA, UNSPECIFIED TYPE: ICD-10-CM

## 2023-10-17 RX ORDER — IBANDRONATE SODIUM 150 MG/1
150 TABLET, FILM COATED ORAL
Qty: 3 TABLET | Refills: 3 | OUTPATIENT
Start: 2023-10-17

## 2023-10-19 RX ORDER — ZOLPIDEM TARTRATE 5 MG/1
5 TABLET ORAL NIGHTLY PRN
Qty: 30 TABLET | Refills: 0 | Status: SHIPPED | OUTPATIENT
Start: 2023-10-19 | End: 2023-11-17 | Stop reason: SDUPTHER

## 2023-10-19 RX ORDER — HYDROCODONE BITARTRATE AND ACETAMINOPHEN 5; 325 MG/1; MG/1
1 TABLET ORAL 3 TIMES DAILY PRN
Qty: 90 TABLET | Refills: 0 | Status: SHIPPED | OUTPATIENT
Start: 2023-10-19 | End: 2023-11-17 | Stop reason: SDUPTHER

## 2023-11-01 ENCOUNTER — APPOINTMENT (OUTPATIENT)
Dept: PRIMARY CARE | Facility: CLINIC | Age: 63
End: 2023-11-01
Payer: COMMERCIAL

## 2023-11-14 ENCOUNTER — APPOINTMENT (OUTPATIENT)
Dept: PRIMARY CARE | Facility: CLINIC | Age: 63
End: 2023-11-14
Payer: COMMERCIAL

## 2023-11-17 DIAGNOSIS — M54.2 CERVICALGIA: ICD-10-CM

## 2023-11-17 DIAGNOSIS — G47.00 INSOMNIA, UNSPECIFIED TYPE: ICD-10-CM

## 2023-11-17 RX ORDER — ZOLPIDEM TARTRATE 5 MG/1
5 TABLET ORAL NIGHTLY PRN
Qty: 30 TABLET | Refills: 0 | Status: SHIPPED | OUTPATIENT
Start: 2023-11-17 | End: 2023-12-14 | Stop reason: SDUPTHER

## 2023-11-17 RX ORDER — HYDROCODONE BITARTRATE AND ACETAMINOPHEN 5; 325 MG/1; MG/1
1 TABLET ORAL 3 TIMES DAILY PRN
Qty: 90 TABLET | Refills: 0 | Status: SHIPPED | OUTPATIENT
Start: 2023-11-17 | End: 2023-12-14 | Stop reason: SDUPTHER

## 2023-11-21 ENCOUNTER — APPOINTMENT (OUTPATIENT)
Dept: PRIMARY CARE | Facility: CLINIC | Age: 63
End: 2023-11-21
Payer: COMMERCIAL

## 2023-12-04 ENCOUNTER — OFFICE VISIT (OUTPATIENT)
Dept: PRIMARY CARE | Facility: CLINIC | Age: 63
End: 2023-12-04
Payer: COMMERCIAL

## 2023-12-04 VITALS
HEART RATE: 100 BPM | BODY MASS INDEX: 22.18 KG/M2 | DIASTOLIC BLOOD PRESSURE: 80 MMHG | OXYGEN SATURATION: 99 % | WEIGHT: 125 LBS | SYSTOLIC BLOOD PRESSURE: 138 MMHG

## 2023-12-04 DIAGNOSIS — J30.9 CHRONIC ALLERGIC RHINITIS: ICD-10-CM

## 2023-12-04 DIAGNOSIS — E03.9 ACQUIRED HYPOTHYROIDISM: ICD-10-CM

## 2023-12-04 DIAGNOSIS — M47.812 CERVICAL SPONDYLOSIS WITHOUT MYELOPATHY: ICD-10-CM

## 2023-12-04 DIAGNOSIS — Z12.31 SCREENING MAMMOGRAM, ENCOUNTER FOR: ICD-10-CM

## 2023-12-04 DIAGNOSIS — I10 BENIGN HYPERTENSION: ICD-10-CM

## 2023-12-04 DIAGNOSIS — F51.04 PSYCHOPHYSIOLOGICAL INSOMNIA: ICD-10-CM

## 2023-12-04 DIAGNOSIS — M81.6 LOCALIZED OSTEOPOROSIS WITHOUT CURRENT PATHOLOGICAL FRACTURE: ICD-10-CM

## 2023-12-04 DIAGNOSIS — F32.1 DEPRESSION, MAJOR, SINGLE EPISODE, MODERATE (MULTI): ICD-10-CM

## 2023-12-04 DIAGNOSIS — K90.9 STEATORRHEA (HHS-HCC): ICD-10-CM

## 2023-12-04 DIAGNOSIS — K58.0 IRRITABLE BOWEL SYNDROME WITH DIARRHEA: ICD-10-CM

## 2023-12-04 DIAGNOSIS — F41.9 ANXIETY: Primary | ICD-10-CM

## 2023-12-04 DIAGNOSIS — C44.311 BASAL CELL CARCINOMA (BCC) OF SKIN OF NOSE: ICD-10-CM

## 2023-12-04 DIAGNOSIS — K21.9 GASTROESOPHAGEAL REFLUX DISEASE WITHOUT ESOPHAGITIS: ICD-10-CM

## 2023-12-04 PROBLEM — M75.22 BICEPS TENDINITIS OF LEFT UPPER EXTREMITY: Status: RESOLVED | Noted: 2023-02-10 | Resolved: 2023-12-04

## 2023-12-04 PROBLEM — N63.0 BREAST LUMP OR MASS: Status: RESOLVED | Noted: 2023-02-10 | Resolved: 2023-12-04

## 2023-12-04 PROCEDURE — 3079F DIAST BP 80-89 MM HG: CPT | Performed by: FAMILY MEDICINE

## 2023-12-04 PROCEDURE — 99214 OFFICE O/P EST MOD 30 MIN: CPT | Performed by: FAMILY MEDICINE

## 2023-12-04 PROCEDURE — 1036F TOBACCO NON-USER: CPT | Performed by: FAMILY MEDICINE

## 2023-12-04 PROCEDURE — 3075F SYST BP GE 130 - 139MM HG: CPT | Performed by: FAMILY MEDICINE

## 2023-12-04 RX ORDER — PREGABALIN 25 MG/1
25 CAPSULE ORAL 2 TIMES DAILY
COMMUNITY
Start: 2023-09-28 | End: 2024-03-11 | Stop reason: SDUPTHER

## 2023-12-04 NOTE — PROGRESS NOTES
Subjective   Patient ID: Caitlin Hogan is a 63 y.o. female who presents for Med Management (Discuss IBS).    HPI   IBS/ Steatorrhea - Celiac panel negative. Bowels are still messed up in that she has loose stools in the AM up to 5 per day. Cramps Coffee will cause this. CT was normal except for the diverticulosis. Scope in . No blood. Cologuard was negative. Getting fiber. Bloated and cramping lower abdomen. Aggravated by Zithromax for the URI. She saw Dr Gonsales and has started Papaya. Did not help. But doing better now. Rifaximin for a couple weeks did help but still comes and goes. She was on Protonix per Dr Lorenz. Helped the stomach feel some better. Lots of stress with brother dying and being executor. A sister  and another brother has cancer. She had normal colonoscopy in February .. Has not tried Gluten limiting but has tried limiting milk.  Can try low FODMPS      Anxiety and insomnia has been up with above and LBP below and kids overseas with . Still daily zolpidem. Helps along with Melatonin. Helps to shut down. Several family members with cancer in the last 5 years of bladder, colon and ovary so has concerns for herself. All were around 60 years old. Several with colon cancer. She is tired a lot for months. Mentally exhausted. Loud at work and does not feel she is getting support with difficult kids . Was on Wellbutrin but did not help. Feels she has been losing weight. Down 7 pound over a year and now stable.  Feels this contributes to the neck pain. She does snore.  Has been to ENT. Checked for sleep apnea in past. Does not want testing at present      Basal cell carcinoma of nose - no skin lesions. Dr Harris booth a couple. Annual visits. Has a few he is observing. Chemo cream now for spots on chest and face.     Cervicalgia - she is back at work and worse. Also with traveling. Had injections multiple times. She was miserable at the last spring.  MICHELE helped for 3 weeks.   Painful  at end of day and headaches. Has had therapy and injections. Bike riding aggravates. Pain to shoulder on left. MRI in 2017 showed moderate C5 foramen stenosis. Otherwise mild steniosis at multiple levels.  Has been to Dr White for injections several years ago. And then to Dr Doe and had injection in 2019. She is to have a series of injections based on MRI in 2021 showing again multilevel cervical spondylosis and stenosis with Dr Cano   A recent MRI on 2/14/23 did not show anything more than moderate stenosis at C5.  Did one injection and ablation. They have started to discuss  surgery. Has had therapy several times through the years with not relief.  Pain level 3-7 . Usually at 4  and drops 2 for 4-6 hours. Allows her to do house and lawn and garden.  And longer walking.  With the LBP below she is not able to function with the meds some days even with meds. .  Would like to ride a motorcycle, gardening, ride longer distances, kayak and gardening. Has lost muscle mass and concerned.      Chronic rhinitis - running nose when outside is cold and with pollen in summer. Allegra helps when taken as needed.  Has been to ENT      Depressive disorder - worries as above. No meds. But will treat the anxiety. Sertraline 25 does not feel like it is doing anything so went off of it. She is getting forgetful. PHQ- 9 score of 5 last year. Not suicidal. Feels she need to see a counselor and will consider when less active. Feels she is down as she is not doing things with being retired.      Family history of multiple cancers in sibling. So she had genetics eval with Dr Munoz and no inherited disorder.      Generalized osteoarthrosis, involving multiple sites -Knee feels like the patella scrapes at times. Pain in the right hip over greater trochanter. Has had the bursa removed. Did help but now pain again and not sure if the bursa recurrence or from the back. All positional and activity related. Not every day. Was with   Jona. She did retire in Sepember.      Hypothyroidism NOS - Good TSH in September on current dose      Insomnia - gets to sleep and stays asleep for 6 hours Ambien and back on Melatonin. Wakes with less anxiety      Low back pain worse now that she is active again and driving. Worse after mowing.  Pain down the left leg. She says she walked 2 miles. Has been about a year.. Escalated since last visit. Did see Chelsea in Dr Cano's office. MRI showed herniated discs. Referred to dr Victor Hugo Friedman who did an injection with no relief. Now cannot get back in and so made appointment at [facility] August 25.  Dr Dumont. Has not  been to therapy and told that it would not help. Norco  tid for pain helps some.  Pain from 2-9.  Tends to run at 4 and drops 4. No numbness or weakness. Mid back hurts after standing.      Lump or mass in breast - good Mammogram in September. Also Pap last fall. Had dysuria that went away as did the blood.      Menopause - no hot flashes.      MVP (mitral valve prolapse) - Fatigue in the chest at times better with BP control. Not pain. Short of breath at times. Flutters at times with anxiety. . No edema.      Osteopenia - on DEXA 9/29/22. She is on Vitamin D and Calcium. Not able to get Prolia. FRAX is 15/6%. Ibandronate now.      Pancreatitis - nothing recent. But says she has  been belching a lot lately and has a pain in left chest when she tips to the side. No heartburn.  No dysphagia..  No blood in stools.    Renal Cyst noted on CT this year. CMP WNL      Spider veins of limb - not sensitive      Tear of medial meniscus of right knee -Dr Tenorio. Still needs to address as more bothersome off and on. Also has a plica and will try Voltaren Gel.      Former smoker. 15 years ago.,      Left foot pain top with walking. No deformity      I have personally reviewed the patients OARRS report. . This report is filed in the EHR. I have considered the risks of abuse, addiction and diversion. I believe it  is clinically appropriate to continue to prescribe this medication. April 23.      UDS 10/20/20 No Norco as cutting back 12/9/21 as expected for medication profile1/13/23 as expected for medication profile   CSA 12/4/23 for Ambien;  CSA 4/11/23  for Norco   ORT 4/27/20 score 2  Mammogram 9/27/22  scope 2/22/23     Review of Systems    Objective   /80 (BP Location: Left arm, Patient Position: Sitting)   Pulse 100   Wt 56.7 kg (125 lb)   SpO2 99%   BMI 22.18 kg/m²     Physical Exam  Constitutional:       Appearance: Normal appearance.   HENT:      Head: Normocephalic.   Eyes:      Extraocular Movements: Extraocular movements intact.      Conjunctiva/sclera: Conjunctivae normal.      Pupils: Pupils are equal, round, and reactive to light.   Neck:      Vascular: No carotid bruit.   Cardiovascular:      Rate and Rhythm: Normal rate and regular rhythm.      Heart sounds: No murmur heard.  Pulmonary:      Effort: Pulmonary effort is normal.      Breath sounds: Normal breath sounds.   Chest:      Chest wall: No tenderness (left upper chest wall anteriorly).   Abdominal:      General: Abdomen is flat. Bowel sounds are normal.      Palpations: Abdomen is soft.      Tenderness: There is abdominal tenderness (epigastrium). There is no guarding.   Musculoskeletal:      Cervical back: Normal range of motion and neck supple. No tenderness.      Comments: Low back with tender muscles but good ROM  Neck with good ROM but tender and tight traps and paracervical  Mid  tight muscles    Lymphadenopathy:      Cervical: No cervical adenopathy.   Neurological:      Mental Status: She is alert and oriented to person, place, and time.   Psychiatric:         Mood and Affect: Mood normal.         Behavior: Behavior normal.         Thought Content: Thought content normal.         Judgment: Judgment normal.     Assessment/Plan   Diagnoses and all orders for this visit:  Anxiety  Basal cell carcinoma (BCC) of skin of  nose  Benign hypertension  Cervical spondylosis without myelopathy  -     Follow Up In Primary Care - Established; Future  Depression, major, single episode, moderate (CMS/HCC)  Psychophysiological insomnia  Acquired hypothyroidism  Chronic allergic rhinitis  Screening mammogram, encounter for  -     BI mammo bilateral screening tomosynthesis; Future  Steatorrhea  Localized osteoporosis without current pathological fracture  Irritable bowel syndrome with diarrhea  Gastroesophageal reflux disease without esophagitis

## 2023-12-14 DIAGNOSIS — M54.2 CERVICALGIA: ICD-10-CM

## 2023-12-14 DIAGNOSIS — G47.00 INSOMNIA, UNSPECIFIED TYPE: ICD-10-CM

## 2023-12-14 RX ORDER — HYDROCODONE BITARTRATE AND ACETAMINOPHEN 5; 325 MG/1; MG/1
1 TABLET ORAL 3 TIMES DAILY PRN
Qty: 90 TABLET | Refills: 0 | Status: SHIPPED | OUTPATIENT
Start: 2023-12-14 | End: 2024-01-12 | Stop reason: SDUPTHER

## 2023-12-14 RX ORDER — ZOLPIDEM TARTRATE 5 MG/1
5 TABLET ORAL NIGHTLY PRN
Qty: 30 TABLET | Refills: 0 | Status: SHIPPED | OUTPATIENT
Start: 2023-12-14 | End: 2024-01-12 | Stop reason: SDUPTHER

## 2024-01-04 ENCOUNTER — TELEPHONE (OUTPATIENT)
Dept: PAIN MEDICINE | Facility: CLINIC | Age: 64
End: 2024-01-04
Payer: COMMERCIAL

## 2024-01-04 DIAGNOSIS — M54.12 CERVICAL RADICULITIS: Primary | ICD-10-CM

## 2024-01-04 RX ORDER — BACLOFEN 10 MG/1
10 TABLET ORAL 3 TIMES DAILY
Qty: 90 TABLET | Refills: 0 | Status: SHIPPED | OUTPATIENT
Start: 2024-01-04 | End: 2024-02-12 | Stop reason: SDUPTHER

## 2024-01-09 ENCOUNTER — ANCILLARY PROCEDURE (OUTPATIENT)
Dept: RADIOLOGY | Facility: CLINIC | Age: 64
End: 2024-01-09
Payer: COMMERCIAL

## 2024-01-09 ENCOUNTER — OFFICE VISIT (OUTPATIENT)
Dept: PAIN MEDICINE | Facility: CLINIC | Age: 64
End: 2024-01-09
Payer: COMMERCIAL

## 2024-01-09 VITALS
WEIGHT: 131 LBS | HEART RATE: 66 BPM | SYSTOLIC BLOOD PRESSURE: 148 MMHG | RESPIRATION RATE: 14 BRPM | BODY MASS INDEX: 23.21 KG/M2 | DIASTOLIC BLOOD PRESSURE: 76 MMHG

## 2024-01-09 DIAGNOSIS — Z12.31 SCREENING MAMMOGRAM, ENCOUNTER FOR: ICD-10-CM

## 2024-01-09 DIAGNOSIS — M47.816 LUMBAR SPONDYLOSIS: ICD-10-CM

## 2024-01-09 DIAGNOSIS — M48.062 NEUROGENIC CLAUDICATION DUE TO LUMBAR SPINAL STENOSIS: Primary | ICD-10-CM

## 2024-01-09 DIAGNOSIS — M51.26 DISPLACEMENT OF LUMBAR INTERVERTEBRAL DISC WITHOUT MYELOPATHY: ICD-10-CM

## 2024-01-09 PROCEDURE — 77067 SCR MAMMO BI INCL CAD: CPT | Performed by: RADIOLOGY

## 2024-01-09 PROCEDURE — 77063 BREAST TOMOSYNTHESIS BI: CPT | Performed by: RADIOLOGY

## 2024-01-09 PROCEDURE — 99213 OFFICE O/P EST LOW 20 MIN: CPT | Performed by: PHYSICIAN ASSISTANT

## 2024-01-09 PROCEDURE — 77067 SCR MAMMO BI INCL CAD: CPT

## 2024-01-09 ASSESSMENT — PATIENT HEALTH QUESTIONNAIRE - PHQ9
2. FEELING DOWN, DEPRESSED OR HOPELESS: NOT AT ALL
1. LITTLE INTEREST OR PLEASURE IN DOING THINGS: NOT AT ALL
SUM OF ALL RESPONSES TO PHQ9 QUESTIONS 1 AND 2: 0

## 2024-01-09 ASSESSMENT — ENCOUNTER SYMPTOMS
RESPIRATORY NEGATIVE: 1
PSYCHIATRIC NEGATIVE: 1
ARTHRALGIAS: 1
ENDOCRINE NEGATIVE: 1
CARDIOVASCULAR NEGATIVE: 1
BACK PAIN: 1
CONSTITUTIONAL NEGATIVE: 1
HEMATOLOGIC/LYMPHATIC NEGATIVE: 1
EYES NEGATIVE: 1
ALLERGIC/IMMUNOLOGIC NEGATIVE: 1

## 2024-01-09 ASSESSMENT — COLUMBIA-SUICIDE SEVERITY RATING SCALE - C-SSRS
2. HAVE YOU ACTUALLY HAD ANY THOUGHTS OF KILLING YOURSELF?: NO
1. IN THE PAST MONTH, HAVE YOU WISHED YOU WERE DEAD OR WISHED YOU COULD GO TO SLEEP AND NOT WAKE UP?: NO
6. HAVE YOU EVER DONE ANYTHING, STARTED TO DO ANYTHING, OR PREPARED TO DO ANYTHING TO END YOUR LIFE?: NO

## 2024-01-09 ASSESSMENT — PAIN SCALES - GENERAL: PAINLEVEL: 3

## 2024-01-09 NOTE — PROGRESS NOTES
Subjective   Patient ID: Caitlin Hogan is a 63 y.o. female who presents for Back Pain (FUV meds. Today reports having lt side lower back pain that radiates into her lt hip and down entire lt leg rates 3/10 now and 10/10 at worst, describes as Aching, pressure, and stabbing constant and changes in intensity the pain affects her ADLs she is taking Lyrica, muscle relaxer, mobic, and Norco she reports this helps ease her pain but does not take it away and she does not like taking a bunch of medications. NELLY score 26%). BMI negative 23.2,  Depression and  Smoking negative,  MMA and ORT score 3, done.  Patient is a 63-year-old female who presents today for a 3-month medication management follow-up.    She previously saw a neurosurgeon at the [facility]. She states that she saw them and they ordered left-sided L3-4 and L4-5 facet injections with steroids.  Done by Dr Ibrahim.  She does feel that it helped but the [facility] is no longer covered by her insurance.  She was supposed to see the surgeon to discuss her options but now she is considering seeing Dr. Graeme Friedman once again.  She previously saw him and she states that surgery was discussed.  She retired and she now wants to just get this fixed.  She does not want to be taking medications or having injections.  She just wants to get it better.  Previously, she underwent a left-sided L4-5 transforaminal epidural steroid injection.  She did not feel that this worked as well.  She currently has lower back pain with left radiating leg pain that she rates a 3-10/10 depending on her activities.  She has previously trialed OTC medications have not helped. She has tried to do some stretching at home that has not helped. At this time, she is using pregabalin. 25 mg Twice daily.  She also uses baclofen.  She tolerates this well. No side effects. She takes as prescribed.           Review of Systems   Constitutional: Negative.    HENT: Negative.     Eyes:  Negative.    Respiratory: Negative.     Cardiovascular: Negative.    Endocrine: Negative.    Genitourinary: Negative.    Musculoskeletal:  Positive for arthralgias, back pain and gait problem.   Skin: Negative.    Allergic/Immunologic: Negative.    Hematological: Negative.    Psychiatric/Behavioral: Negative.         Objective   Physical Exam  Vitals and nursing note reviewed.   Constitutional:       Appearance: Normal appearance. She is normal weight.   HENT:      Head: Normocephalic and atraumatic.      Right Ear: External ear normal.      Left Ear: External ear normal.      Nose: Nose normal.      Mouth/Throat:      Pharynx: Oropharynx is clear.   Eyes:      Pupils: Pupils are equal, round, and reactive to light.   Cardiovascular:      Rate and Rhythm: Normal rate and regular rhythm.      Pulses: Normal pulses.   Pulmonary:      Effort: Pulmonary effort is normal.   Musculoskeletal:         General: Normal range of motion.      Cervical back: Normal range of motion.      Comments: 5/5 strength  Pain with compression of the lumbar facet joints   Skin:     General: Skin is warm and dry.   Neurological:      General: No focal deficit present.      Mental Status: She is alert and oriented to person, place, and time. Mental status is at baseline.   Psychiatric:         Mood and Affect: Mood normal.         Behavior: Behavior normal.         Thought Content: Thought content normal.         Judgment: Judgment normal.     Previous notes from Dr. Graeme Friedman and the [facility] were reviewed.  This included the procedure report from the [facility].    Assessment/Plan   Diagnoses and all orders for this visit:  Neurogenic claudication due to lumbar spinal stenosis  -     Referral to Spine Surgery; Future  Displacement of lumbar intervertebral disc without myelopathy  -     Referral to Spine Surgery; Future  Lumbar spondylosis  -     Referral to Spine Surgery; Future       Patient is a 63-year-old female with a  past medical history significant for lumbar neuritis, lumbar degenerative disease, lumbar stenosis, lumbar spondylosis and lumbar disc herniation.  The [facility] is no longer covered by her insurance.  She wonders about going back to Dr. Graeme Friedman as she saw him in the past and states that surgery was discussed as a last resort.  She feels that she is now at this point.  She states that she has retired and she just wants to get this taken care of.  She does not want to be getting injections and be on medications if she does not have to be.  We will facilitate a referral back to Dr. Graeme Friedman.  At this time, she is here for medication management follow-up.  She continues on Lyrica and baclofen.  She does not require any refills.  She will call when she does.  OARRS was reviewed and is appropriate.  She will follow-up in 3 months once again for medication management.  She will call us once again should it be necessary.  We also discussed repeating the left-sided L3-4 and L4-5 facet injection once again.  This to be done for diagnostic purposes for possible RFA.  At this time, she is me to see the surgeon first and foremost and will follow-up as above-mentioned.  Michelle Sheldon RN 01/09/24 10:07 AM

## 2024-01-10 ENCOUNTER — APPOINTMENT (OUTPATIENT)
Dept: PAIN MEDICINE | Facility: HOSPITAL | Age: 64
End: 2024-01-10
Payer: COMMERCIAL

## 2024-01-10 ENCOUNTER — APPOINTMENT (OUTPATIENT)
Dept: PAIN MEDICINE | Facility: CLINIC | Age: 64
End: 2024-01-10
Payer: COMMERCIAL

## 2024-01-10 ENCOUNTER — HOSPITAL ENCOUNTER (OUTPATIENT)
Dept: RADIOLOGY | Facility: EXTERNAL LOCATION | Age: 64
Discharge: HOME | End: 2024-01-10

## 2024-01-12 DIAGNOSIS — E03.9 HYPOTHYROIDISM, UNSPECIFIED TYPE: ICD-10-CM

## 2024-01-12 DIAGNOSIS — I10 BENIGN HYPERTENSION: ICD-10-CM

## 2024-01-12 DIAGNOSIS — G47.00 INSOMNIA, UNSPECIFIED TYPE: ICD-10-CM

## 2024-01-12 DIAGNOSIS — M54.2 CERVICALGIA: ICD-10-CM

## 2024-01-12 RX ORDER — HYDROCODONE BITARTRATE AND ACETAMINOPHEN 5; 325 MG/1; MG/1
1 TABLET ORAL 3 TIMES DAILY PRN
Qty: 90 TABLET | Refills: 0 | Status: SHIPPED | OUTPATIENT
Start: 2024-01-12 | End: 2024-02-12 | Stop reason: SDUPTHER

## 2024-01-12 RX ORDER — LISINOPRIL 5 MG/1
5 TABLET ORAL DAILY
Qty: 90 TABLET | Refills: 1 | Status: SHIPPED | OUTPATIENT
Start: 2024-01-12

## 2024-01-12 RX ORDER — LEVOTHYROXINE SODIUM 100 UG/1
100 TABLET ORAL DAILY
Qty: 90 TABLET | Refills: 1 | Status: SHIPPED | OUTPATIENT
Start: 2024-01-12

## 2024-01-12 RX ORDER — ZOLPIDEM TARTRATE 5 MG/1
5 TABLET ORAL NIGHTLY PRN
Qty: 30 TABLET | Refills: 0 | Status: SHIPPED | OUTPATIENT
Start: 2024-01-12 | End: 2024-02-12 | Stop reason: SDUPTHER

## 2024-01-25 ENCOUNTER — TELEPHONE (OUTPATIENT)
Dept: PAIN MEDICINE | Facility: CLINIC | Age: 64
End: 2024-01-25
Payer: COMMERCIAL

## 2024-01-25 NOTE — TELEPHONE ENCOUNTER
Patient called and stated that you gujaylen had discussed injections at her last visit.  However, she states at that time she wanted to see the surgeon first.  She states she would like to have injections now.  The pain is too severe.  She would like to have the same injection she had on 09/18/23 at [facility].  That note is in the care everywhere tab if you wish to view it.  Please advise.  Thanks!

## 2024-02-12 ENCOUNTER — TELEPHONE (OUTPATIENT)
Dept: PAIN MEDICINE | Facility: CLINIC | Age: 64
End: 2024-02-12
Payer: COMMERCIAL

## 2024-02-12 DIAGNOSIS — M54.12 CERVICAL RADICULITIS: ICD-10-CM

## 2024-02-12 DIAGNOSIS — G47.00 INSOMNIA, UNSPECIFIED TYPE: ICD-10-CM

## 2024-02-12 DIAGNOSIS — M54.2 CERVICALGIA: ICD-10-CM

## 2024-02-12 RX ORDER — ZOLPIDEM TARTRATE 5 MG/1
5 TABLET ORAL NIGHTLY PRN
Qty: 30 TABLET | Refills: 0 | Status: SHIPPED | OUTPATIENT
Start: 2024-02-12 | End: 2024-03-11 | Stop reason: SDUPTHER

## 2024-02-12 RX ORDER — BACLOFEN 10 MG/1
10 TABLET ORAL 3 TIMES DAILY
Qty: 90 TABLET | Refills: 0 | Status: SHIPPED | OUTPATIENT
Start: 2024-02-12 | End: 2024-03-11 | Stop reason: SDUPTHER

## 2024-02-12 RX ORDER — HYDROCODONE BITARTRATE AND ACETAMINOPHEN 5; 325 MG/1; MG/1
1 TABLET ORAL 3 TIMES DAILY PRN
Qty: 90 TABLET | Refills: 0 | Status: SHIPPED | OUTPATIENT
Start: 2024-02-12 | End: 2024-03-11 | Stop reason: SDUPTHER

## 2024-03-08 ENCOUNTER — TELEPHONE (OUTPATIENT)
Dept: PAIN MEDICINE | Facility: CLINIC | Age: 64
End: 2024-03-08
Payer: COMMERCIAL

## 2024-03-11 DIAGNOSIS — M54.2 CERVICALGIA: ICD-10-CM

## 2024-03-11 DIAGNOSIS — M54.12 CERVICAL RADICULITIS: ICD-10-CM

## 2024-03-11 DIAGNOSIS — G47.00 INSOMNIA, UNSPECIFIED TYPE: ICD-10-CM

## 2024-03-11 RX ORDER — ZOLPIDEM TARTRATE 5 MG/1
5 TABLET ORAL NIGHTLY PRN
Qty: 30 TABLET | Refills: 0 | Status: SHIPPED | OUTPATIENT
Start: 2024-03-11 | End: 2024-04-11 | Stop reason: SDUPTHER

## 2024-03-11 RX ORDER — BACLOFEN 10 MG/1
10 TABLET ORAL 3 TIMES DAILY
Qty: 90 TABLET | Refills: 0 | Status: SHIPPED | OUTPATIENT
Start: 2024-03-11 | End: 2024-05-29 | Stop reason: SDUPTHER

## 2024-03-11 RX ORDER — PREGABALIN 25 MG/1
25 CAPSULE ORAL 2 TIMES DAILY
Qty: 180 CAPSULE | Refills: 0 | Status: SHIPPED | OUTPATIENT
Start: 2024-03-11 | End: 2024-03-13 | Stop reason: SDUPTHER

## 2024-03-11 RX ORDER — HYDROCODONE BITARTRATE AND ACETAMINOPHEN 5; 325 MG/1; MG/1
1 TABLET ORAL 3 TIMES DAILY PRN
Qty: 90 TABLET | Refills: 0 | Status: SHIPPED | OUTPATIENT
Start: 2024-03-11 | End: 2024-04-10

## 2024-03-13 ENCOUNTER — TELEPHONE (OUTPATIENT)
Dept: PAIN MEDICINE | Facility: CLINIC | Age: 64
End: 2024-03-13
Payer: COMMERCIAL

## 2024-03-13 DIAGNOSIS — M51.26 DISPLACEMENT OF LUMBAR INTERVERTEBRAL DISC WITHOUT MYELOPATHY: ICD-10-CM

## 2024-03-13 DIAGNOSIS — M48.062 NEUROGENIC CLAUDICATION DUE TO LUMBAR SPINAL STENOSIS: Primary | ICD-10-CM

## 2024-03-13 DIAGNOSIS — M47.816 LUMBAR SPONDYLOSIS: ICD-10-CM

## 2024-03-13 DIAGNOSIS — M54.12 CERVICAL RADICULITIS: ICD-10-CM

## 2024-03-13 DIAGNOSIS — M47.812 CERVICAL SPONDYLOSIS WITHOUT MYELOPATHY: ICD-10-CM

## 2024-03-13 RX ORDER — PREGABALIN 50 MG/1
50 CAPSULE ORAL 2 TIMES DAILY
Qty: 180 CAPSULE | Refills: 0 | Status: SHIPPED | OUTPATIENT
Start: 2024-03-13 | End: 2024-03-14 | Stop reason: SDUPTHER

## 2024-03-13 NOTE — TELEPHONE ENCOUNTER
February 1, 2024  Chelsea Couch PA-C   to Kavitha Cifuentes, RN       2/1/24  8:07 AM  She should use two pills twice a day so 50mg BID    Patients insurance denied the script that was sent by [facility] on 3/8/24 as lyrica 25 mg 2 po every day with dx of m54.12  due to dx not being covered,  the above message mulu had called in and was told to double the prescription  and is now out, can you resend the script to pharmacy as   Lyrical 50 mg bid      I tried doing an appeal but mihai with [employer] is under a network interruption due to a  attach

## 2024-03-14 ENCOUNTER — TELEPHONE (OUTPATIENT)
Dept: PAIN MEDICINE | Facility: CLINIC | Age: 64
End: 2024-03-14
Payer: COMMERCIAL

## 2024-03-14 RX ORDER — PREGABALIN 50 MG/1
50 CAPSULE ORAL 2 TIMES DAILY
Qty: 180 CAPSULE | Refills: 0 | Status: SHIPPED | OUTPATIENT
Start: 2024-03-14 | End: 2024-04-09 | Stop reason: WASHOUT

## 2024-03-14 NOTE — TELEPHONE ENCOUNTER
----- Message from Chelsea Couch PA-C sent at 3/14/2024 12:19 PM EDT -----  Regarding: RE: MED  ok  ----- Message -----  From: Josie Foy CMA  Sent: 3/14/2024  11:57 AM EDT  To: Chelsea Couch PA-C  Subject: RE: MED                                          Patient states she has been off the lryica now for 4 days, she has an appointment with Dr. Graeme Friedman this upcoming Monday and she wants to discuss with him before making a decision she will call back  ----- Message -----  From: Josie Foy CMA  Sent: 3/14/2024  11:39 AM EDT  To: Josie Foy CMA  Subject: RE: MED                                          LEFT MESSAGE FOR PATIENT TO RETURN CALL TO REVIEW BELOW MESSAGE  ----- Message -----  From: Chelsea Couch PA-C  Sent: 3/14/2024  11:37 AM EDT  To: Josie Foy CMA  Subject: RE: MED                                          The Lyrica is no longer covered.  She can continue on it and maybe get it through [facility] or we could try something different such as amitriptyline?  ----- Message -----  From: Josie Foy CMA  Sent: 3/14/2024  11:35 AM EDT  To: Chelsea Couch PA-C  Subject: RE: MED                                          DX CODES, DENIAL LETTER IN BOX  ----- Message -----  From: Chelsea Couch PA-C  Sent: 3/14/2024  11:22 AM EDT  To: Josie Foy CMA  Subject: RE: MED                                          What is the reason for denial?  ----- Message -----  From: Josie Foy CMA  Sent: 3/14/2024  11:14 AM EDT  To: Chelsea Couch PA-C  Subject: FW: MED                                          PATIENTS PHARMACY BENEFITS HAVE STILL DENIED THE MEDICATION AFTER OFFICE NOTES AND DOCUMENTATION WERE UPLOADED WITH NEW DX AND CONTINUATION OF PRESCRIPTION  ----- Message -----  From: Josie Foy CMA  Sent: 3/14/2024   9:25 AM EDT  To: Josie Foy CMA  Subject: RE: MED                                          I RESUBMITTED AUTH WITH NEW DX   ----- Message  -----  From: Josie Foy CMA  Sent: 3/14/2024   8:31 AM EDT  To: Josie Foy CMA  Subject: MED                                              I CALLED AND SPOKE TO SUSI WITH PHARMACY BENEFITS ON THE BACK OF THE CURRENT CARD WE HAVE SCANNED THE SYSTEM IN [place] CARD DATED 2023, HE STATES SHE DOES NOT HAVE CURRENT BENEFITS UNDER THAT CARD SHE SHOULD HAVE A 2024 CARD, WILL NEED TO HAVE IT GIVEN TO PHARMACY AND NEW CARD FOR OUR OFFICE, PHARMACY WILL NEED TO RE-SUBMIT WITH NEW CARD

## 2024-03-18 ENCOUNTER — OFFICE VISIT (OUTPATIENT)
Dept: NEUROSURGERY | Facility: CLINIC | Age: 64
End: 2024-03-18
Payer: COMMERCIAL

## 2024-03-18 VITALS
SYSTOLIC BLOOD PRESSURE: 122 MMHG | TEMPERATURE: 97.8 F | HEART RATE: 82 BPM | WEIGHT: 128 LBS | BODY MASS INDEX: 21.85 KG/M2 | DIASTOLIC BLOOD PRESSURE: 80 MMHG | HEIGHT: 64 IN

## 2024-03-18 DIAGNOSIS — M51.26 DISPLACEMENT OF LUMBAR INTERVERTEBRAL DISC WITHOUT MYELOPATHY: ICD-10-CM

## 2024-03-18 DIAGNOSIS — M48.062 NEUROGENIC CLAUDICATION DUE TO LUMBAR SPINAL STENOSIS: ICD-10-CM

## 2024-03-18 DIAGNOSIS — M47.816 LUMBAR SPONDYLOSIS: ICD-10-CM

## 2024-03-18 PROCEDURE — 1036F TOBACCO NON-USER: CPT | Performed by: STUDENT IN AN ORGANIZED HEALTH CARE EDUCATION/TRAINING PROGRAM

## 2024-03-18 PROCEDURE — 3079F DIAST BP 80-89 MM HG: CPT | Performed by: STUDENT IN AN ORGANIZED HEALTH CARE EDUCATION/TRAINING PROGRAM

## 2024-03-18 PROCEDURE — 3074F SYST BP LT 130 MM HG: CPT | Performed by: STUDENT IN AN ORGANIZED HEALTH CARE EDUCATION/TRAINING PROGRAM

## 2024-03-18 PROCEDURE — 99214 OFFICE O/P EST MOD 30 MIN: CPT | Performed by: STUDENT IN AN ORGANIZED HEALTH CARE EDUCATION/TRAINING PROGRAM

## 2024-03-18 ASSESSMENT — LIFESTYLE VARIABLES
SKIP TO QUESTIONS 9-10: 1
HOW OFTEN DO YOU HAVE A DRINK CONTAINING ALCOHOL: 2-4 TIMES A MONTH
AUDIT-C TOTAL SCORE: 2
HOW OFTEN DO YOU HAVE SIX OR MORE DRINKS ON ONE OCCASION: NEVER
HOW MANY STANDARD DRINKS CONTAINING ALCOHOL DO YOU HAVE ON A TYPICAL DAY: 1 OR 2

## 2024-03-18 ASSESSMENT — PAIN SCALES - GENERAL: PAINLEVEL: 3

## 2024-03-18 ASSESSMENT — PATIENT HEALTH QUESTIONNAIRE - PHQ9
2. FEELING DOWN, DEPRESSED OR HOPELESS: NOT AT ALL
1. LITTLE INTEREST OR PLEASURE IN DOING THINGS: NOT AT ALL
SUM OF ALL RESPONSES TO PHQ9 QUESTIONS 1 & 2: 0

## 2024-03-18 NOTE — PROGRESS NOTES
[facility]  Department of Neurological Surgery  Established Patient Visit    History of Present Illness  Caitlin Hogan is a 63 y.o. year old female who presents to the spine clinic in follow up with intermittent lumbar radiculopathy in the L3 and L4 distribution.  Today she has very little pain in her leg.  She states that over the past 1 to 2 weeks the pain in the leg has subsequently resolved.  She states that over the holidays and into the spring however she was having significant amount of pain and discomfort and she did not know what to do.  She is attempted conservative care in the past including physical therapy and medications.  She has not had any recent injections.  She has a prior history of a transforaminal steroid injection that was performed at L4-L5 approximately a year ago.    Patient's BMI is Body mass index is 22.32 kg/m².    14/14 systems reviewed and negative other than what is listed in the history of present illness    Patient Active Problem List   Diagnosis    Anxiety    Basal cell carcinoma    Benign hypertension    Cervical radiculitis    Cervical spondylosis without myelopathy    Chronic allergic rhinitis    Depression, major, single episode, moderate (CMS/HCC)    Generalized osteoarthritis of multiple sites    GERD (gastroesophageal reflux disease)    Hypothyroidism    Impingement syndrome of right shoulder    Insomnia    Irritable bowel syndrome with diarrhea    Osteoporosis    Spider veins of limb    Steatorrhea    Synovial plica syndrome of left knee    Tear of medial meniscus of right knee    Neurogenic claudication due to lumbar spinal stenosis    Displacement of lumbar intervertebral disc without myelopathy    Lumbar spondylosis     Past Medical History:   Diagnosis Date    Abdominal distension (gaseous) 05/03/2021    Bloating    ADHD (attention deficit hyperactivity disorder)     Allergic     Anxiety     Arthritis     Depression     Disease of thyroid  gland     Hypertension     Personal history of other diseases of the circulatory system 2021    History of abnormal electrocardiography    Personal history of other diseases of the respiratory system 2021    History of acute sinusitis    Personal history of other diseases of urinary system 2021    History of hematuria    Personal history of other endocrine, nutritional and metabolic disease     History of thyroid disorder    Unspecified abdominal pain 2021    Chronic abdominal pain     Past Surgical History:   Procedure Laterality Date     SECTION, LOW TRANSVERSE      HYSTERECTOMY      OTHER SURGICAL HISTORY  10/23/2019    Bursectomy    OTHER SURGICAL HISTORY  10/23/2019    Colonoscopy    OTHER SURGICAL HISTORY  10/23/2019    Colon surgery    OTHER SURGICAL HISTORY  10/23/2019     section    OTHER SURGICAL HISTORY  10/23/2019    Hysterectomy total    OTHER SURGICAL HISTORY  10/01/2021    Epidural steroid injection    OTHER SURGICAL HISTORY  2019    Epidural steroid injection    SMALL INTESTINE SURGERY       Social History     Tobacco Use    Smoking status: Former     Packs/day: 1.5     Types: Cigarettes    Smokeless tobacco: Never   Substance Use Topics    Alcohol use: Yes     Alcohol/week: 2.0 standard drinks of alcohol     Types: 2 Cans of beer per week     family history includes Aneurysm in her father; COPD in her father; Cancer in her brother and sister; Colon cancer in her brother and sister; Deep vein thrombosis in her sister; Heart disease in her father; Hypertension in her father, mother, and sister; Ovarian cancer in her sister; bladder cancer in her brother.    Current Outpatient Medications:     baclofen (Lioresal) 10 mg tablet, Take 1 tablet (10 mg) by mouth 3 times a day., Disp: 90 tablet, Rfl: 0    HYDROcodone-acetaminophen (Norco) 5-325 mg tablet, Take 1 tablet by mouth 3 times a day as needed for moderate pain (4 - 6)., Disp: 90 tablet, Rfl: 0     ibandronate (Boniva) 150 mg tablet, Take 1 tablet (150 mg) by mouth every 30 (thirty) days., Disp: 4 tablet, Rfl: 3    levothyroxine (Synthroid, Levoxyl) 100 mcg tablet, Take 1 tablet (100 mcg) by mouth once daily., Disp: 90 tablet, Rfl: 1    lisinopril 5 mg tablet, Take 1 tablet (5 mg) by mouth once daily., Disp: 90 tablet, Rfl: 1    meloxicam (Mobic) 15 mg tablet, Take 1 tablet (15 mg) by mouth once daily., Disp: 90 tablet, Rfl: 3    multivitamin tablet, Take 1 tablet by mouth once daily., Disp: , Rfl:     naloxone (Narcan) 4 mg/0.1 mL nasal spray, Administer 1 spray (4 mg) into affected nostril(s) if needed., Disp: , Rfl:     pregabalin (Lyrica) 50 mg capsule, Take 1 capsule (50 mg) by mouth 2 times a day., Disp: 180 capsule, Rfl: 0    zolpidem (Ambien) 5 mg tablet, Take 1 tablet (5 mg) by mouth as needed at bedtime for sleep., Disp: 30 tablet, Rfl: 0    biotin 5 mg capsule, Take 1 capsule (5 mg) by mouth once daily., Disp: , Rfl:     calcium carbonate-vitamin D3 600 mg-5 mcg (200 unit) tablet, Take 1 tablet by mouth once daily., Disp: , Rfl:     diclofenac sodium (Voltaren) 1 % gel gel, Apply 1 Application topically 4 times a day as needed (joint pain). (Patient not taking: Reported on 3/18/2024), Disp: 100 g, Rfl: 11    fexofenadine (Allegra) 180 mg tablet, Take 1 tablet (180 mg) by mouth once daily as needed., Disp: , Rfl:   Allergies   Allergen Reactions    Cephalexin Nausea Only       Physical Examination:    General: Well developed, awake/alert/oriented x3, no distress, alert and cooperative  Skin: Warm and dry, no lesions, no rashes  ENMT: Mucous membranes moist, no apparent injury, no lesions seen  Head/Neck: Neck Supple, no apparent injury  Respiratory/Thorax: Normal breath sounds with good chest expansion, thorax symmetric  Cardiovascular: No pitting edema, no JVD    Motor Strength: 5/5 Throughout all extremities    Muscle Bulk: Normal and symmetric in all extremities    Posture:   -- Cervical:  Normal  -- Thoracic: Normal  -- Lumbar : Normal  Paraspinal muscle spasm/tenderness absent.     Sensation: intact to light touch  Lumbar radiculopathy in the L3 and L4 distribution that has now resolved today in the office    Results:  I personally reviewed and interpreted the imaging results which included MRI of the lumbar spine which shows multilevel degenerative disc disease at L3-4 and L4-5 with mild lateral recess stenosis left greater than right    Assessment and Plan:      Caitlin Hogan is a 63 y.o. year old female who presents to the spine clinic in follow up with lumbar radiculopathy that is now resolved.  I explained to her that surgery would be the last resort.  I would like her to continue with physical therapy consider traction, chiropractic manipulation, pain management.  She can [place] back with me if her symptoms are to worsen.  I would like her to get an updated x-ray and an MRI of the lumbar spine has been over a calendar year since these were performed.  And given that her symptoms are in a different distribution.      I have reviewed all prior documentation and reviewed the electronic medical record since admission. I have personally have reviewed all advanced imaging not just the reports and used my interpretation as documented as the relevant findings. I have reviewed the risks and benefits of all treatment recommendations listed in this note with the patient and family. I spent a total of 35 minutes in service to this patient's care during this date of service.      The above clinical summary has been dictated with voice recognition software. It has not been proofread for grammatical errors, typographical mistakes, or other semantic inconsistencies.    Thank you for visiting our office today. It was our pleasure to take part in your healthcare.     Do not hesitate to call with any questions regarding your plan of care after leaving at [phone] M-F 8am-4pm.     To clinicians, thank you  very much for this kind referral. It is a privilege to partner with you in the care of your patients. My office would be delighted to assist you with any further consultations or with questions regarding the plan of care outlined. Do not hesitate to call the office or contact me directly.       Sincerely,      Graeme Friedman MD, [facility]  Spine , [facility]  Landry Kirk and Arielle Kirk Chair in Spinal Neurosurgery  Neurosurgery , [facility]  Complex Spine Surgery Fellowship Director   of Neurological Surgery  [employer] [facility] [employer]    [facility]  [address]  [place] [address]    [facility]  [address]  [address]  [place] [address]    Phone: [phone]  Fax: [phone]

## 2024-03-22 ENCOUNTER — APPOINTMENT (OUTPATIENT)
Dept: PRIMARY CARE | Facility: CLINIC | Age: 64
End: 2024-03-22
Payer: COMMERCIAL

## 2024-03-25 ENCOUNTER — OFFICE VISIT (OUTPATIENT)
Dept: PRIMARY CARE | Facility: CLINIC | Age: 64
End: 2024-03-25
Payer: COMMERCIAL

## 2024-03-25 VITALS
BODY MASS INDEX: 21.51 KG/M2 | SYSTOLIC BLOOD PRESSURE: 136 MMHG | WEIGHT: 126 LBS | HEIGHT: 64 IN | DIASTOLIC BLOOD PRESSURE: 82 MMHG | HEART RATE: 80 BPM | OXYGEN SATURATION: 96 %

## 2024-03-25 DIAGNOSIS — I10 BENIGN HYPERTENSION: ICD-10-CM

## 2024-03-25 DIAGNOSIS — M51.26 DISPLACEMENT OF LUMBAR INTERVERTEBRAL DISC WITHOUT MYELOPATHY: ICD-10-CM

## 2024-03-25 DIAGNOSIS — K58.0 IRRITABLE BOWEL SYNDROME WITH DIARRHEA: ICD-10-CM

## 2024-03-25 DIAGNOSIS — E03.9 ACQUIRED HYPOTHYROIDISM: ICD-10-CM

## 2024-03-25 DIAGNOSIS — M81.6 LOCALIZED OSTEOPOROSIS WITHOUT CURRENT PATHOLOGICAL FRACTURE: ICD-10-CM

## 2024-03-25 DIAGNOSIS — J30.9 CHRONIC ALLERGIC RHINITIS: ICD-10-CM

## 2024-03-25 DIAGNOSIS — F41.9 ANXIETY: ICD-10-CM

## 2024-03-25 DIAGNOSIS — Z79.899 MEDICATION MANAGEMENT: Primary | ICD-10-CM

## 2024-03-25 DIAGNOSIS — F51.04 PSYCHOPHYSIOLOGICAL INSOMNIA: ICD-10-CM

## 2024-03-25 DIAGNOSIS — K21.9 GASTROESOPHAGEAL REFLUX DISEASE WITHOUT ESOPHAGITIS: ICD-10-CM

## 2024-03-25 DIAGNOSIS — M47.812 CERVICAL SPONDYLOSIS WITHOUT MYELOPATHY: ICD-10-CM

## 2024-03-25 DIAGNOSIS — M75.41 IMPINGEMENT SYNDROME OF RIGHT SHOULDER: ICD-10-CM

## 2024-03-25 DIAGNOSIS — F32.1 DEPRESSION, MAJOR, SINGLE EPISODE, MODERATE (MULTI): ICD-10-CM

## 2024-03-25 DIAGNOSIS — M15.9 GENERALIZED OSTEOARTHRITIS OF MULTIPLE SITES: ICD-10-CM

## 2024-03-25 PROCEDURE — 1036F TOBACCO NON-USER: CPT | Performed by: FAMILY MEDICINE

## 2024-03-25 PROCEDURE — 80368 SEDATIVE HYPNOTICS: CPT

## 2024-03-25 PROCEDURE — 80361 OPIATES 1 OR MORE: CPT

## 2024-03-25 PROCEDURE — 80358 DRUG SCREENING METHADONE: CPT

## 2024-03-25 PROCEDURE — 80307 DRUG TEST PRSMV CHEM ANLYZR: CPT

## 2024-03-25 PROCEDURE — 99214 OFFICE O/P EST MOD 30 MIN: CPT | Performed by: FAMILY MEDICINE

## 2024-03-25 PROCEDURE — 3075F SYST BP GE 130 - 139MM HG: CPT | Performed by: FAMILY MEDICINE

## 2024-03-25 PROCEDURE — 80365 DRUG SCREENING OXYCODONE: CPT

## 2024-03-25 PROCEDURE — 80354 DRUG SCREENING FENTANYL: CPT

## 2024-03-25 PROCEDURE — 82570 ASSAY OF URINE CREATININE: CPT

## 2024-03-25 PROCEDURE — 80373 DRUG SCREENING TRAMADOL: CPT

## 2024-03-25 PROCEDURE — 3079F DIAST BP 80-89 MM HG: CPT | Performed by: FAMILY MEDICINE

## 2024-03-25 PROCEDURE — 80346 BENZODIAZEPINES1-12: CPT

## 2024-03-25 NOTE — PROGRESS NOTES
Subjective   Patient ID: Caitlin Hogan is a 63 y.o. female who presents for Med Management (Sore throat/).    HPI   IBS/ Steatorrhea - Celiac panel negative. Bowels are still messed up in that she has loose stools in the AM up to 5 per day. Cramps Coffee will cause this. CT was normal except for the diverticulosis. Scope in . No blood. Cologuard was negative. Getting fiber. Bloated and cramping lower abdomen. Aggravated by Zithromax for the URI. She saw Dr Gonsales and has started Papaya. Did not help. But doing better now. Rifaximin for a couple weeks did help but still comes and goes. She was on Protonix per Dr Lorenz. Helped the stomach feel some better. Lots of stress with brother dying and being executor. A sister  and another brother has cancer. She had normal colonoscopy in 2023 Ice cream clearly aggravates. Has not tried Gluten limiting but has tried limiting milk.  Can try low FODMPS      Anxiety and insomnia has been some better since she retired and can feel her body changing inside. Still stress with  LBP below and kids overseas with . Still daily zolpidem.  Helps along with Melatonin. Helps to shut down. Still wakes in the night. Several family members with cancer in the last 5 years of bladder, colon and ovary so has concerns for herself. All were around 60 years old. Several with colon cancer. She is tired a lot for months. Mentally exhausted. Was very loud at work and does not feel she is getting support with difficult kids.   Was on Wellbutrin but did not help. Feels she has been losing weight. Down 9 pound over a year and down a bit more this time.   Feels stress this contributes to the neck pain. She does snore.  Has been to ENT. Checked for sleep apnea in past. Does not want testing at present.     Basal cell carcinoma of nose - no skin lesions. Dr Harris booth a couple. Annual visits. Has a few he is observing. Chemo cream now for spots on chest and face.      Cervicalgia - she is back at work and worse. Also with traveling. Had injections multiple times. She was miserable at the last spring.  MICHELE helped for 3 weeks.   Painful at end of day and headaches. Has had therapy and injections. Bike riding aggravates. Pain to shoulder on left. MRI in 2017 showed moderate C5 foramen stenosis. Otherwise mild steniosis at multiple levels.  Has been to Dr White for injections several years ago. And then to Dr Doe and had injection in 2019. She is to have a series of injections based on MRI in 2021 showing again multilevel cervical spondylosis and stenosis with Dr Cano   A recent MRI on 2/14/23 did not show anything more than moderate stenosis at C5.  Did one injection and ablation. They have started to discuss  surgery. Has had therapy several times through the years with not relief.  Pain level 3-7  . Usually at 4  and drops 2 for 3-5hours. Allows her to do house and lawn and garden.  And longer walking.  With the LBP below she is not able to function with the meds some days even with meds. .  Would like to ride a motorcycle, gardening, ride longer distances, kayak and gardening. Has lost muscle mass and concerned.      Chronic rhinitis - running nose when outside is cold and with pollen in summer. Allegra helps when taken as needed.  Has been to ENT      Depressive disorder - worries as above. No meds. But will treat the anxiety. Sertraline 25 does not feel like it is doing anything so went off of it. She is getting forgetful. Not suicidal. Feels she need to see a counselor and will consider when less active. Feels she is down as she is not doing things with being retired.      Family history of multiple cancers in sibling. So she had genetics eval with Dr Munoz and no inherited disorder.      Generalized osteoarthrosis, involving multiple sites -Knee feels like the patella scrapes at times. Pain in the right hip over greater trochanter. Has had the bursa removed. Did help  but now pain again and not sure if the bursa recurrence or from the back. All positional and activity related. Not every day. Was with Dr Tenorio. She did retire in Sepember.      Hypothyroidism NOS - Good TSH in September on current dose      Insomnia - gets to sleep and stays asleep for 6 hours Ambien and back on Melatonin. Wakes with less anxiety      Low back pain worse now that she is active around the house.  Worse after mowing.  Pain down the left leg. She says she would like to walk 2 miles. Has been about a year. Escalated since last visit. Did see Chelsea in Dr Cano's office. MRI showed herniated discs. Referred to dr Victor Hugo Friedman who did an injection with no relief. Now to have MRI April 2.  Did see a Dr Dumont who did an injection.  Some help.  Has not  been to therapy and told that it would not help. Norco  tid for pain helps some.   No numbness or weakness. Mid back hurts after standing.      Lump or mass in breast - good Mammogram in September. Also Pap last fall. Had dysuria that went away as did the blood.      Menopause - no hot flashes.      MVP (mitral valve prolapse) - Fatigue in the chest at times better with BP control. Not pain. Short of breath at times. Flutters at times with anxiety. . No edema.      Osteopenia - on DEXA 9/29/22. She is on Vitamin D and Calcium. Not able to get Prolia. FRAX is 15/6%. Ibandronate now.      Pancreatitis - nothing recent. But says she has  been belching a lot lately and has a pain in left chest when she tips to the side. No heartburn.  No dysphagia..  No blood in stools.     Renal Cyst noted on CT this year. CMP WNL last year      Spider veins of limb - not sensitive      Tear of medial meniscus of right knee -Dr Tenorio. This is doing better      Former smoker. 16 years ago.,  She smoked 1- 1.5 PPD for 30+.  Has not had CT for screen yet. Will discuss next visit      Left foot pain top with walking. No deformity      I have personally reviewed the  "patients OARRS report. . This report is filed in the EHR. I have considered the risks of abuse, addiction and diversion. I believe it is clinically appropriate to continue to prescribe this medication.  Discussed frequent one day early fills.      UDS 10/20/20 No Norco as cutting back 12/9/21 as expected for medication profile1/13/23 as expected for medication profile 3/25/24 as expected for medication profile   CSA 12/4/23 for Ambien;  CSA 3/25/24  for Norco   ORT 4/27/20 score 2  Mammogram 9/27/22  scope 2/22/23     Review of Systems    Objective   /82 (BP Location: Left arm, Patient Position: Sitting)   Pulse 80   Ht 1.613 m (5' 3.5\")   Wt 57.2 kg (126 lb)   SpO2 96%   BMI 21.97 kg/m²     Physical Exam  Vitals reviewed.   Constitutional:       Appearance: Normal appearance.   HENT:      Head: Normocephalic.      Right Ear: Tympanic membrane, ear canal and external ear normal.      Left Ear: Tympanic membrane, ear canal and external ear normal.      Nose: Nose normal.      Mouth/Throat:      Mouth: Mucous membranes are moist.      Pharynx: Oropharynx is clear.   Eyes:      Extraocular Movements: Extraocular movements intact.      Conjunctiva/sclera: Conjunctivae normal.      Pupils: Pupils are equal, round, and reactive to light.   Neck:      Vascular: No carotid bruit.   Cardiovascular:      Rate and Rhythm: Normal rate and regular rhythm.      Heart sounds: No murmur heard.  Pulmonary:      Effort: Pulmonary effort is normal.      Breath sounds: Normal breath sounds.   Abdominal:      General: Abdomen is flat. Bowel sounds are normal.      Palpations: Abdomen is soft.   Musculoskeletal:         General: Tenderness (paracervical and good ROM of neck.  Paralumbar) present. No swelling. Normal range of motion.      Cervical back: Normal range of motion and neck supple. No tenderness.      Comments: SLR WNL    Lymphadenopathy:      Cervical: No cervical adenopathy.   Skin:     General: Skin is warm and " dry.   Neurological:      General: No focal deficit present.      Mental Status: She is alert and oriented to person, place, and time.   Psychiatric:         Mood and Affect: Mood normal.         Behavior: Behavior normal.         Thought Content: Thought content normal.         Judgment: Judgment normal.         Assessment/Plan   Diagnoses and all orders for this visit:  Medication management  -     Opiate/Opioid/Benzo Prescription Compliance  -     OOB Internal Tracking  Cervical spondylosis without myelopathy  -     Follow Up In Primary Care - Established  Anxiety  Benign hypertension  Chronic allergic rhinitis  Depression, major, single episode, moderate (CMS/HCC)  Displacement of lumbar intervertebral disc without myelopathy  Generalized osteoarthritis of multiple sites  Gastroesophageal reflux disease without esophagitis  Acquired hypothyroidism  Impingement syndrome of right shoulder  Psychophysiological insomnia  Irritable bowel syndrome with diarrhea  Localized osteoporosis without current pathological fracture  Other orders  -     Follow Up In Primary Care - Established; Future

## 2024-03-26 LAB
AMPHETAMINES UR QL SCN: NORMAL
BARBITURATES UR QL SCN: NORMAL
BZE UR QL SCN: NORMAL
CANNABINOIDS UR QL SCN: NORMAL
CREAT UR-MCNC: 91 MG/DL (ref 20–320)
PCP UR QL SCN: NORMAL

## 2024-03-27 DIAGNOSIS — M47.816 LUMBAR SPONDYLOSIS: ICD-10-CM

## 2024-03-27 DIAGNOSIS — M48.062 NEUROGENIC CLAUDICATION DUE TO LUMBAR SPINAL STENOSIS: Primary | ICD-10-CM

## 2024-03-27 DIAGNOSIS — M51.26 DISPLACEMENT OF LUMBAR INTERVERTEBRAL DISC WITHOUT MYELOPATHY: ICD-10-CM

## 2024-03-29 LAB
1OH-MIDAZOLAM UR CFM-MCNC: <25 NG/ML
6MAM UR CFM-MCNC: <25 NG/ML
7AMINOCLONAZEPAM UR CFM-MCNC: <25 NG/ML
A-OH ALPRAZ UR CFM-MCNC: <25 NG/ML
ALPRAZ UR CFM-MCNC: <25 NG/ML
CHLORDIAZEP UR CFM-MCNC: <25 NG/ML
CLONAZEPAM UR CFM-MCNC: <25 NG/ML
CODEINE UR CFM-MCNC: <50 NG/ML
DIAZEPAM UR CFM-MCNC: <25 NG/ML
EDDP UR CFM-MCNC: <25 NG/ML
FENTANYL UR CFM-MCNC: <2.5 NG/ML
HYDROCODONE CTO UR CFM-MCNC: 574 NG/ML
HYDROMORPHONE UR CFM-MCNC: 369 NG/ML
LORAZEPAM UR CFM-MCNC: <25 NG/ML
METHADONE UR CFM-MCNC: <25 NG/ML
MIDAZOLAM UR CFM-MCNC: <25 NG/ML
MORPHINE UR CFM-MCNC: <50 NG/ML
NORDIAZEPAM UR CFM-MCNC: <25 NG/ML
NORFENTANYL UR CFM-MCNC: <2.5 NG/ML
NORHYDROCODONE UR CFM-MCNC: >1000 NG/ML
NOROXYCODONE UR CFM-MCNC: <25 NG/ML
NORTRAMADOL UR-MCNC: <50 NG/ML
OXAZEPAM UR CFM-MCNC: <25 NG/ML
OXYCODONE UR CFM-MCNC: <25 NG/ML
OXYMORPHONE UR CFM-MCNC: <25 NG/ML
TEMAZEPAM UR CFM-MCNC: <25 NG/ML
TRAMADOL UR CFM-MCNC: <50 NG/ML
ZOLPIDEM UR CFM-MCNC: 183 NG/ML
ZOLPIDEM UR-MCNC: <25 NG/ML

## 2024-04-02 ENCOUNTER — APPOINTMENT (OUTPATIENT)
Dept: RADIOLOGY | Facility: HOSPITAL | Age: 64
End: 2024-04-02
Payer: COMMERCIAL

## 2024-04-08 ENCOUNTER — HOSPITAL ENCOUNTER (OUTPATIENT)
Dept: RADIOLOGY | Facility: HOSPITAL | Age: 64
Discharge: HOME | End: 2024-04-08
Payer: COMMERCIAL

## 2024-04-08 DIAGNOSIS — M48.062 NEUROGENIC CLAUDICATION DUE TO LUMBAR SPINAL STENOSIS: ICD-10-CM

## 2024-04-08 PROCEDURE — 72114 X-RAY EXAM L-S SPINE BENDING: CPT | Performed by: STUDENT IN AN ORGANIZED HEALTH CARE EDUCATION/TRAINING PROGRAM

## 2024-04-08 PROCEDURE — 72120 X-RAY BEND ONLY L-S SPINE: CPT

## 2024-04-09 ENCOUNTER — OFFICE VISIT (OUTPATIENT)
Dept: PAIN MEDICINE | Facility: CLINIC | Age: 64
End: 2024-04-09
Payer: COMMERCIAL

## 2024-04-09 VITALS
BODY MASS INDEX: 21.97 KG/M2 | SYSTOLIC BLOOD PRESSURE: 140 MMHG | RESPIRATION RATE: 16 BRPM | HEART RATE: 61 BPM | WEIGHT: 126 LBS | DIASTOLIC BLOOD PRESSURE: 64 MMHG

## 2024-04-09 DIAGNOSIS — M47.812 CERVICAL SPONDYLOSIS WITHOUT MYELOPATHY: ICD-10-CM

## 2024-04-09 DIAGNOSIS — M47.816 LUMBAR SPONDYLOSIS: ICD-10-CM

## 2024-04-09 DIAGNOSIS — M54.12 CERVICAL RADICULITIS: ICD-10-CM

## 2024-04-09 DIAGNOSIS — M51.26 DISPLACEMENT OF LUMBAR INTERVERTEBRAL DISC WITHOUT MYELOPATHY: ICD-10-CM

## 2024-04-09 DIAGNOSIS — M54.16 LUMBAR RADICULAR PAIN: Primary | ICD-10-CM

## 2024-04-09 PROCEDURE — 99214 OFFICE O/P EST MOD 30 MIN: CPT | Performed by: PHYSICIAN ASSISTANT

## 2024-04-09 RX ORDER — PREGABALIN 25 MG/1
25 CAPSULE ORAL 2 TIMES DAILY
Qty: 60 CAPSULE | Refills: 1 | Status: SHIPPED | OUTPATIENT
Start: 2024-04-09 | End: 2024-06-11 | Stop reason: SDUPTHER

## 2024-04-09 ASSESSMENT — ENCOUNTER SYMPTOMS
ALLERGIC/IMMUNOLOGIC NEGATIVE: 1
BACK PAIN: 1
HEMATOLOGIC/LYMPHATIC NEGATIVE: 1
ARTHRALGIAS: 1
NUMBNESS: 1
MYALGIAS: 1
CONSTITUTIONAL NEGATIVE: 1
ENDOCRINE NEGATIVE: 1
NECK PAIN: 1
WEAKNESS: 1
EYES NEGATIVE: 1
GASTROINTESTINAL NEGATIVE: 1
RESPIRATORY NEGATIVE: 1
CARDIOVASCULAR NEGATIVE: 1
PSYCHIATRIC NEGATIVE: 1

## 2024-04-09 ASSESSMENT — COLUMBIA-SUICIDE SEVERITY RATING SCALE - C-SSRS
1. IN THE PAST MONTH, HAVE YOU WISHED YOU WERE DEAD OR WISHED YOU COULD GO TO SLEEP AND NOT WAKE UP?: NO
2. HAVE YOU ACTUALLY HAD ANY THOUGHTS OF KILLING YOURSELF?: NO
6. HAVE YOU EVER DONE ANYTHING, STARTED TO DO ANYTHING, OR PREPARED TO DO ANYTHING TO END YOUR LIFE?: NO

## 2024-04-09 NOTE — PROGRESS NOTES
Subjective   Patient ID: Caitlin Hogan is a 63 y.o. female who presents for Pain (FUV for L thigh pain, L low back pain, neck pain. Pain score 3/10 today. Pain is all described as an ache with occasional stabbing pain in low back and neck. She would like to discuss a neck injection. Patient saw Dr. Friedman, neurosurgeon on 3-18-24. Daily activity such as yard work, increase pain. Medication, heat, and rest will help decrease the pain. Patient states she stopped taking the Lyrica when she had appt with Dr. Friedman. Insurance then denied paying for the Lyrica. ). She would like to discuss taking the Lyrica further. NELLY = 26/100. ETOH screen negative.     Henny Castaneda RN 04/09/24 9:51 AM     Patient is a 63-year-old female who presents today for a follow-up after seeing a spine surgeon.  She states that she saw Dr. Friedman.  A new MRI was ordered but she is not able to have the MRI until after she does therapy.  She also states at that time her leg pain was resolved but now it is back.  She has left lower back pain with left radiating leg pain.  Per Dr. Friedman's note he felt that she had radicular symptoms along the dermatome of the left L3 and L4 nerves.  She has historically undergone left-sided L4-5 transforaminal epidural steroid injection without relief and then a left-sided L3-4 and L4-5 facet injections with steroids.  Done by Dr Ibrahim.  She does feel that it helped but the [facility] is no longer covered by her insurance.  She currently has lower back pain with left radiating leg pain that she rates a 3-10/10 depending on her activities.  She has previously trialed OTC medications have not helped. She has tried to do some stretching at home that has not helped.  She has not been using the Lyrica and wonders about getting back on it.  She also has some neck pain with shoulder pain but she states that the lower back and leg pain are most bothersome and she wants to get this taken care of  first.        Review of Systems   Constitutional: Negative.    HENT: Negative.     Eyes: Negative.    Respiratory: Negative.     Cardiovascular: Negative.    Gastrointestinal: Negative.    Endocrine: Negative.    Genitourinary: Negative.    Musculoskeletal:  Positive for arthralgias, back pain, gait problem, myalgias and neck pain.   Skin: Negative.    Allergic/Immunologic: Negative.    Neurological:  Positive for weakness and numbness.   Hematological: Negative.    Psychiatric/Behavioral: Negative.         Objective   Physical Exam  Vitals and nursing note reviewed.   Constitutional:       Appearance: Normal appearance. She is normal weight.   HENT:      Head: Normocephalic and atraumatic.      Right Ear: External ear normal.      Left Ear: External ear normal.      Nose: Nose normal.      Mouth/Throat:      Pharynx: Oropharynx is clear.   Eyes:      Conjunctiva/sclera: Conjunctivae normal.   Cardiovascular:      Rate and Rhythm: Normal rate and regular rhythm.      Pulses: Normal pulses.   Pulmonary:      Effort: Pulmonary effort is normal.   Musculoskeletal:         General: Normal range of motion.      Cervical back: Normal range of motion.      Comments: 5/5 upper and lower extremity strength other than left hip flexion 4+/5   Skin:     General: Skin is warm and dry.   Neurological:      General: No focal deficit present.      Mental Status: She is alert and oriented to person, place, and time. Mental status is at baseline.   Psychiatric:         Mood and Affect: Mood normal.         Behavior: Behavior normal.         Thought Content: Thought content normal.         Judgment: Judgment normal.       MR lumbar spine wo IV contrast  Status: Final result     PACS Images     Show images for MR lumbar spine wo IV contrast  Signed by    Signed Time Phone Pager   Jd Atwood MD 5/05/2023 11:17 [phone]     Exam Information    Status Exam Begun Exam Ended   Final  5/05/2023 09:49     Study  Result    Narrative   Interpreted By:  BJ SWARTZ MD  MRN: [id number]  Patient Name: MARILYNN ROMAN     STUDY:  MRI L-SPINE WO;  5/5/2023 9:49 am     INDICATION:  lower back and intermittent leg pain  M47.816: Lumbar spondylosis  M54.16: Lumbar radiculopathy, chronic.     COMPARISON:  12/14/2022 radiographs     ACCESSION NUMBER(S):  [id number]     ORDERING CLINICIAN:  RODRIGO MCNAMARA     TECHNIQUE:  Sagittal T1, T2, STIR, axial T1 and T2 weighted images of the lumbar  spine were acquired.     FINDINGS:  Alignment: There are 5 lumbar type vertebrae with slight  retrolisthesis at L2-3 and L3-4.     Vertebrae/Intervertebral Discs: The vertebral bodies demonstrate  expected height.The marrow in the anterior corners of the L1 and L2  vertebral bodies abutting the L1-2 disc as patchy degenerative edema.  The L1-2 thru L4-5 discs have degenerative desiccation with mild loss  of height especially at L3-4.     Conus: The lower thoracic cord appears unremarkable. The conus  terminates at L1.     T11-12: No stenosis is noted on the sagittal images.     T12-L1: No stenosis is noted.     L1-2: The spinal canal is minimally stenosed by disc bulge.     L2-3: The lateral recesses and spinal canal are mildly stenosed by  disc bulge, facet hypertrophy and ligament thickening. The disc has  an annular fissure posteriorly in the midline. The facet joints are  mildly arthritic.     L3-4: The lateral recesses and spinal canal are mildly to moderately  stenosed secondary to disc protrusion, ligament thickening and facet  hypertrophy. The facet joints are mildly arthritic.     L4-5: The left lateral recess and neural foramen are moderately  stenosed by left-sided disc protrusion abutting the left L4 nerve  root as it exits the foramen. The facet joints are mildly arthritic.     L5-S1: No stenosis is noted. The facet joints are mildly arthritic.     The prevertebral and posterior paraspinous soft tissues are  unremarkable.      Impression    Multilevel degenerative changes as noted most prominent on the left  at L4-5.     Result History    MR lumbar spine wo IV contrast (Order #[id number]) on 5/5/2023 - Order Result History Report    MR CERVICAL SPINE WO IV CONTRAST  Status: Final result     PACS Images     Show images for MR CERVICAL SPINE WO IV CONTRAST  Signed by    Signed Time Phone Pager   Jd Atwood MD 2/14/2023 08:29 [phone]     Exam Information    Status Exam Begun Exam Ended   Final 2/10/2023 15:58      Study Result    Narrative & Impression   Interpreted By:  JD ATWOOD MD and DIGNA RIVERA MD  MRN: [id number]  Patient Name: MARILYNN ROMAN     STUDY:  MRI CERVICAL WO;  2/13/2023 1:59 pm     INDICATION:  neck pain and left arm pain  M54.12: Cervical radiculitis M47.812:  Cervical spondylosis without myelopathy.  Neck and left arm shoulder  pain. No known injury     COMPARISON:  MRI cervical Spine: 12/16/2021.     ACCESSION NUMBER(S):  [id number]     ORDERING CLINICIAN:  RODRIGO MNCAMARA     TECHNIQUE:  Sagittal T1, T2, STIR, axial T1 and axial T2 weighted images were  acquired through the cervical spine.     FINDINGS:  Alignment: There is re-demonstration of grade 1 anterolisthesis of C3  over C4 and C4 over C5 vertebral bodies. The spine is mildly kyphotic  centered C5 similar prior.        Vertebrae/Intervertebral Discs: The vertebral bodies demonstrate  expected height.  The marrow signal is within normal limits. The  intervertebral discs demonstrate mild intervertebral disc desiccation  predominantly involving the mid cervical spine.     Cord: Normal in caliber and signal.     C1-C2: The cervicomedullary junction appears unremarkable. There is  no spinal canal stenosis.     C2-C3: There is no posterior disc contour abnormality. There is no  significant spinal canal or neural foraminal stenosis.     C3-C4: There is mild posterior disc bulge. There is no significant  spinal canal stenosis or neural foraminal stenosis.      C4-C5: There is mild posterior disc bulge with slight impression on  the posterior thecal sac and mild spinal canal stenosis or neural  foraminal stenosis.     C5-C6: There is mild posterior disc bulging and mild spinal canal  stenosis mild to moderate right lateral recess/foraminal stenosis  disc bulge and uncovertebral spurring..     C6-C7: There is left lateral disc bulging and mild spinal canal  stenosis with no neural foraminal stenosis.     C7-T1: There is no posterior disc contour abnormality. There is no  significant spinal canal or neural foraminal stenosis.     The upper thoracic vertebrae and spinal canal are unremarkable.     The prevertebral and posterior paraspinous soft tissues are within  normal limits.     IMPRESSION:  Multilevel cervical spine degenerative changes as described above  with mild spinal canal stenosis predominantly affecting vertebral  bodies from C5 to C6. Findings are similar to the 12/16/2021 exam.     I personally reviewed the images/study and I agree with the findings  as stated. This study was interpreted at [facility], [place], [place].     Dr. Graeme Friedman's most recent note was reviewed.    Assessment/Plan   Diagnoses and all orders for this visit:  Lumbar radicular pain  Cervical radiculitis  Cervical spondylosis without myelopathy  Displacement of lumbar intervertebral disc without myelopathy  -     pregabalin (Lyrica) 25 mg capsule; Take 1 capsule (25 mg) by mouth 2 times a day.  Lumbar spondylosis  -     pregabalin (Lyrica) 25 mg capsule; Take 1 capsule (25 mg) by mouth 2 times a day.       Patient is a 63-year-old female with a past medical history significant for lumbar spondylosis, lumbar degenerative disease, lumbar neuritis, cervical spondylosis and cervical neuritis.  At this time, her lower back pain with left radiating leg pain are more bothersome than her neck pain with bilateral shoulder pain.  She states that in the future  she may want to pursue another cervical injection but at this time, she wants to pursue an injection to her lower back to try to get some relief.  We reviewed her MRIs.  We discussed different options.  She be noted that patient has trialed and failed all reasonable conservative treatments.  She has left lower back pain with left radiating leg pain that she rates a 3-10/10.  It gets worse with the more upright and active she tries to be.  This significantly affects her quality of life and affects her ability to do the things she wants to do.  I recommended to patient a left-sided L3-4 and L4-5 transforaminal epidural steroid injection under fluoroscopy.  Diagnosis-M54.16.  Procedure was discussed.  Risk and benefits were discussed.  Medication hold including vitamins for 1 week and meloxicam for 4 days was discussed.  Patient will follow-up 2 weeks after the injection for reevaluation.  In the meantime, she would like to restart Lyrica.  How to do so was discussed.  Patient side effects were discussed.  She has used in the past with relief as well.  OARRS reviewed.

## 2024-04-11 DIAGNOSIS — G47.00 INSOMNIA, UNSPECIFIED TYPE: ICD-10-CM

## 2024-04-11 RX ORDER — ZOLPIDEM TARTRATE 5 MG/1
5 TABLET ORAL NIGHTLY PRN
Qty: 30 TABLET | Refills: 0 | Status: SHIPPED | OUTPATIENT
Start: 2024-04-11 | End: 2024-05-09 | Stop reason: SDUPTHER

## 2024-04-15 ENCOUNTER — TELEPHONE (OUTPATIENT)
Dept: PAIN MEDICINE | Facility: CLINIC | Age: 64
End: 2024-04-15
Payer: COMMERCIAL

## 2024-04-15 NOTE — TELEPHONE ENCOUNTER
left-sided L3-4 and L4-5 transforaminal epidural steroid injection under fluoroscopy. CPT 69263/65800 Diagnosis-M54.16.   PENDING CLINICAL REVIEW

## 2024-04-17 ENCOUNTER — EVALUATION (OUTPATIENT)
Dept: PHYSICAL THERAPY | Facility: CLINIC | Age: 64
End: 2024-04-17
Payer: COMMERCIAL

## 2024-04-17 DIAGNOSIS — M51.26 DISPLACEMENT OF LUMBAR INTERVERTEBRAL DISC WITHOUT MYELOPATHY: ICD-10-CM

## 2024-04-17 DIAGNOSIS — M48.062 NEUROGENIC CLAUDICATION DUE TO LUMBAR SPINAL STENOSIS: ICD-10-CM

## 2024-04-17 DIAGNOSIS — M47.816 LUMBAR SPONDYLOSIS: ICD-10-CM

## 2024-04-17 PROCEDURE — 97161 PT EVAL LOW COMPLEX 20 MIN: CPT | Mod: GP | Performed by: PHYSICAL THERAPIST

## 2024-04-17 PROCEDURE — 97110 THERAPEUTIC EXERCISES: CPT | Mod: GP | Performed by: PHYSICAL THERAPIST

## 2024-04-17 ASSESSMENT — PAIN - FUNCTIONAL ASSESSMENT: PAIN_FUNCTIONAL_ASSESSMENT: 0-10

## 2024-04-17 ASSESSMENT — ENCOUNTER SYMPTOMS
DEPRESSION: 0
LOSS OF SENSATION IN FEET: 0
OCCASIONAL FEELINGS OF UNSTEADINESS: 0

## 2024-04-17 ASSESSMENT — PATIENT HEALTH QUESTIONNAIRE - PHQ9
SUM OF ALL RESPONSES TO PHQ9 QUESTIONS 1 AND 2: 0
2. FEELING DOWN, DEPRESSED OR HOPELESS: NOT AT ALL
1. LITTLE INTEREST OR PLEASURE IN DOING THINGS: NOT AT ALL

## 2024-04-17 ASSESSMENT — PAIN SCALES - GENERAL: PAINLEVEL_OUTOF10: 4

## 2024-04-17 NOTE — PROGRESS NOTES
Physical Therapy Evaluation and Treatment      Patient Name: Caitlin Hogan  MRN: [id number]  Today's Date: 4/17/2024  Time Calculation  Start Time: 1005  Stop Time: 1045  Time Calculation (min): 40 min    PT Evaluation Time Entry  PT Evaluation (Low) Time Entry: 29   PT Therapeutic Procedures Time Entry  Therapeutic Exercise Time Entry: 10                         Assessment:  PT Assessment Results: Decreased strength, Decreased range of motion, Decreased mobility, Pain  Rehab Prognosis: Good  Barriers to Participation:  (None)    The pt presents with Medical Dx of Lumbar Spondylosis, Displacement of Lumbar Intervertebral Disc without Myelopathy and Neurogenic Claudication due to Lumbar Spinal Stenosis.   Pt presents with the following deficits: increased pain, decreased strength, ROM, flexibility, gait , functional ability to lift/carry objects and functional mobility.  Pt would benefit from PT services in order to improve on these deficits and maximize strength and ability for functional activity/mobility.        Plan:  Treatment/Interventions: Aquatic therapy, Cryotherapy, Dry needling, Education/ Instruction, Electrical stimulation, Hot pack, Manual therapy, Therapeutic exercises, Ultrasound (Manual Therapy including IASTM as needed)  PT Plan: Skilled PT  PT Frequency:  (2x/wk for 4 weeks or 9 sessions)  Rehab Potential: Good  Plan of Care Agreement: Patient (Patient Goal:  Improve pain and be more active with less pain)    Current Problem:   Problem List Items Addressed This Visit             ICD-10-CM       Musculoskeletal and Injuries    Neurogenic claudication due to lumbar spinal stenosis M48.062       Neuro    Displacement of lumbar intervertebral disc without myelopathy M51.26    Lumbar spondylosis M47.816       Subjective   Pt reports that she has had LBP for a couple years and it started to radiate down her L leg approx 6 months ago.  Pt reports that she needs an MRI and needs to try some PT rx before  getting one.  Pt reports that her pain is worse with sitting prolonged and with bending and lifting objects.  Pt reports that her low back feels better to walk and move around as well as with heat and medication.    General  Reason for Referral: Low Back issue  Referred By: Graeme Friedman MD  General Comment: Visit # 1/9 Needs Auth    Precautions:  Precautions  Precautions Comment: Lower Fall.  PMH:  No significant PMH    Pain:  Pain Assessment  Pain Assessment: 0-10  Pain Score: 4 (LBP pain range  3-9/10)  Pain Type: Chronic pain  Pain Location: Back  Pain Orientation: Lower    Home Living:   Pt lives with her  in a split level home.  Pt has a couple flts of 6 steps with rail.     Prior Level of Function:   Pt was I with all ADL's and IADL's prior.  Retired.        Objective   Observation:  FHP with rounded shoulders.     Palpation:  Tender L3-5 levels and L paraspinals.      Sensation:  Intact to light touch b/l LE's.                                      Myotomes/Strength:      R-   Hip Flex   5/5   Knee flex   5/5   Knee ext   5/5   DF   5/5   PF   5/5        L-   Hip Flex   4/5   Knee flex   4/5   Knee ext   4/5   DF   5/5   PF   5/5                                                           Lumbar AROM:      R-   Flex  No Alvarenga     Ext   Alvarenga  25%   SB   Alvarenga   25%       L-     SB   Alvarenga  25%                                                           Special Tests:  R Slump Test   (+)                           L Slump Test  (+)                      R Piriformis Test  (+)                      L Piriformis Test   (+)    Flexion/Extension Biased:  More Extension Biased.      Outcome Measures:  Other Measures  Oswestry Disablity Index (NELLY): 28     Treatments:  Ther Ex:  10 min   1 unit  Prone Positioning 3 min  Prone Positioning on elbow 2 min    Hooklying Piriformis/Glut Stretch  20 sec hold x 2 ea  Hooklying Lateral Trunk Rotation  (small comfortable arc of motion)   2 min  HEP instruction with handout  given    OP EDUCATION:   PT edu pt regarding PT POC/course of treatment and HEP.  Pt was given exercise handout as a reference.  Pt verbalized good understanding.  Access Code: [email or web address]    Goals:  Active       PT Problem       PT Goal 1       Start:  24    Expected End:  24       LTG's:    1) Improve Lumbar AROM from Alvarenga 25% Ext and b/l SB   to No Significant Limitation in all planes of deficit in order to facilitate improved gait and mobility.       4-6 weeks      2) Improve Core and L hip/knee  strength from  4/5 to >= 5-/5 throughout in order to facilitate safe gait and mobility.         4-6 weeks      3) Improve Modified Oswestry score by >= 5 points in order to improve QOL.     4-6 weeks      4) Improve LBP from  9/10 to <= 3 /10 with activity in order to improve QOL.      4-6 weeks      5) Pt will be able to walk longer distances, lift/carry objects and perform standing activity in the home without significant limitation       4-6 weeks      ST)  Pt/caregiver will be I and consistent with HEP in order to maximize strength and flexibility.    2-3 weeks

## 2024-04-19 DIAGNOSIS — M54.2 CERVICALGIA: ICD-10-CM

## 2024-04-19 RX ORDER — HYDROCODONE BITARTRATE AND ACETAMINOPHEN 5; 325 MG/1; MG/1
1 TABLET ORAL 3 TIMES DAILY
COMMUNITY
End: 2024-04-19 | Stop reason: SDUPTHER

## 2024-04-19 RX ORDER — HYDROCODONE BITARTRATE AND ACETAMINOPHEN 5; 325 MG/1; MG/1
1 TABLET ORAL 3 TIMES DAILY
Qty: 90 TABLET | Refills: 0 | Status: SHIPPED | OUTPATIENT
Start: 2024-04-19 | End: 2024-05-20 | Stop reason: SDUPTHER

## 2024-04-25 ENCOUNTER — TREATMENT (OUTPATIENT)
Dept: PHYSICAL THERAPY | Facility: CLINIC | Age: 64
End: 2024-04-25
Payer: COMMERCIAL

## 2024-04-25 DIAGNOSIS — M47.816 LUMBAR SPONDYLOSIS: ICD-10-CM

## 2024-04-25 DIAGNOSIS — M51.26 DISPLACEMENT OF LUMBAR INTERVERTEBRAL DISC WITHOUT MYELOPATHY: ICD-10-CM

## 2024-04-25 DIAGNOSIS — M48.062 NEUROGENIC CLAUDICATION DUE TO LUMBAR SPINAL STENOSIS: ICD-10-CM

## 2024-04-25 PROCEDURE — 97113 AQUATIC THERAPY/EXERCISES: CPT | Mod: GP,CQ

## 2024-04-25 ASSESSMENT — PAIN SCALES - GENERAL: PAINLEVEL_OUTOF10: 2

## 2024-04-25 ASSESSMENT — PAIN - FUNCTIONAL ASSESSMENT: PAIN_FUNCTIONAL_ASSESSMENT: 0-10

## 2024-04-25 NOTE — PROGRESS NOTES
Physical Therapy Treatment    Patient Name: Caitlin Hogan  MRN: [id number]  Today's Date: 4/25/2024  Time Calculation  Start Time: 1000  Stop Time: 1045  Time Calculation (min): 45 min    PT Therapeutic Procedures Time Entry  Aquatic Therapy Time Entry: 43         Subjective  Patient reports  100% compliance with HEP and expressed good understanding. Patient states that she had a bad day yesterday secondary to vacuuming and cleaning, 8-10/10. Patient states that her low back pain is 2/10 today.            Precautions  Precautions  Precautions Comment: Lower Fall.  PMH:  No significant PMH  Pain  Pain Assessment: 0-10  Pain Score: 2  Pain Type: Chronic pain  Pain Location: Back  Pain Orientation: Lower    Assessment: Patient identified by name and date of birth. Reviewed TrA contraction with patient at start of session, Patient able to perform with minimal compensation and fair contraction. Focused today's session on extension biased exercises while keep TrA intact. Patient did get some increased back pain with LE abd kicks, cues to keep core engaged and reduced speed. No increased LE Sx throughout session and decreased L sided low back pain with unloading.       PT Assessment  PT Assessment Results: Decreased strength, Decreased range of motion, Decreased mobility, Pain  Rehab Prognosis: Good  Barriers to Discharge: None    Plan: Plan to continue with core strengthening in extension bias to allow for improved tolerance to prolonged ambulation and sitting. JW    OP PT Plan  Treatment/Interventions: Aquatic therapy, Cryotherapy, Dry needling, Education/ Instruction, Electrical stimulation, Hot pack, Manual therapy, Therapeutic exercises, Ultrasound (Manual Therapy including IASTM as needed)  PT Plan: Skilled PT  PT Frequency:  (2x/wk for 4 weeks or 9 sessions)  Rehab Potential: Good  Plan of Care Agreement: Patient (Patient Goal:  Improve pain and be more active with less pain)    Current Problem  1. Neurogenic  claudication due to lumbar spinal stenosis  Follow Up In Physical Therapy      2. Displacement of lumbar intervertebral disc without myelopathy  Follow Up In Physical Therapy      3. Lumbar spondylosis  Follow Up In Physical Therapy          Reason for Referral: Low Back issue  Referred By: Graeme Friedman MD  General Comment: Visit # 2 Needs Auth    Treatments:   All exercises performed in 65-70% WBing unless noted  TrA contraction review 10 x 5” holds  Side Stepping 3 laps  Hip Extention x10  Hip Abduction x10  Alt HS Curl x10  Heel Raises x10  Squats A  UE paddles Open x20  -flex/ext, push/pull, habd/hadd, abd/add  Dumbbell Rolls A  Paddle Board-push/pull, flex/ext  A  100% unloading 1 lrg noodle, 2 UE support at pool ladder  -Skiing 5'  -hip abd 5'  -hang 5'  Gradual exit 3'    OP EDUCATION:    PT edu pt regarding PT POC/course of treatment and HEP.  Pt was given exercise handout as a reference.  Pt verbalized good understanding.  Access Code: [email or web address]    Goals:  Active       PT Problem       PT Goal 1       Start:  24    Expected End:  24       LTG's:    1) Improve Lumbar AROM from Alvarenga 25% Ext and b/l SB   to No Significant Limitation in all planes of deficit in order to facilitate improved gait and mobility.       4-6 weeks      2) Improve Core and L hip/knee  strength from  4/5 to >= 5-/5 throughout in order to facilitate safe gait and mobility.         4-6 weeks      3) Improve Modified Oswestry score by >= 5 points in order to improve QOL.     4-6 weeks      4) Improve LBP from  9/10 to <= 3 /10 with activity in order to improve QOL.      4-6 weeks      5) Pt will be able to walk longer distances, lift/carry objects and perform standing activity in the home without significant limitation       4-6 weeks      ST)  Pt/caregiver will be I and consistent with HEP in order to maximize strength and flexibility.    2-3 weeks

## 2024-04-26 ENCOUNTER — TREATMENT (OUTPATIENT)
Dept: PHYSICAL THERAPY | Facility: CLINIC | Age: 64
End: 2024-04-26
Payer: COMMERCIAL

## 2024-04-26 DIAGNOSIS — M51.26 DISPLACEMENT OF LUMBAR INTERVERTEBRAL DISC WITHOUT MYELOPATHY: ICD-10-CM

## 2024-04-26 DIAGNOSIS — M47.816 LUMBAR SPONDYLOSIS: ICD-10-CM

## 2024-04-26 DIAGNOSIS — M48.062 NEUROGENIC CLAUDICATION DUE TO LUMBAR SPINAL STENOSIS: ICD-10-CM

## 2024-04-26 PROCEDURE — 97113 AQUATIC THERAPY/EXERCISES: CPT | Mod: GP,CQ | Performed by: PHYSICAL THERAPY ASSISTANT

## 2024-04-26 ASSESSMENT — PAIN SCALES - GENERAL: PAINLEVEL_OUTOF10: 2

## 2024-04-26 ASSESSMENT — PAIN - FUNCTIONAL ASSESSMENT: PAIN_FUNCTIONAL_ASSESSMENT: 0-10

## 2024-04-26 NOTE — PROGRESS NOTES
"Physical Therapy    Physical Therapy Treatment    Patient Name: Caitlin Hogan  MRN: [id number]  Today's Date: 2024  Time Calculation  Start Time: 1000  Stop Time: 1045  Time Calculation (min): 45 min  PT Therapeutic Procedures Time Entry  Aquatic Therapy Time Entry: 42                   Current Problem  Problem List Items Addressed This Visit             ICD-10-CM       Musculoskeletal and Injuries    Neurogenic claudication due to lumbar spinal stenosis M48.062       Neuro    Displacement of lumbar intervertebral disc without myelopathy M51.26    Lumbar spondylosis M47.816        General  Reason for Referral: Low Back issue  Referred By: Graeme Friedman MD  General Comment: Visit 3/9    Subjective   Patient reports no falls and states low back pain of 2/10 with radiating pain down left LE to knee.    Precautions  Precautions  Precautions Comment: Lower Fall.  PMH:  No significant PMH    Pain  Pain Assessment: 0-10  Pain Score: 2  Pain Type: Chronic pain  Pain Location: Back  Pain Orientation: Lower    Objective    All exercises performed in 65-70% WBing unless noted  TrA contraction review 10 x 5” holds  Side Stepping 3 laps  Hip Extention x10  Hip Abduction x10  Alt HS Curl x10  Heel Raises x10  Squats x 10  UE paddles Open x20  -flex/ext, push/pull, habd/hadd, abd/add  Dumbbell Rolls x 30\" ea. Dir sm. Blue board  Paddle Board-push/pull, flex/ext  x 10 ea.  100% unloading 1 lrg noodle,  no support at pool ladder  -Skiing 5'  -hip abd 5'  -hang 5'  Gradual exit 3'    Treatments:    OP Education      Assessment  Patient tolerated treatment without increased pain.  Patient has good TrA and keeps intact with there-ex.  Patient needing one UE support with LE there-ex and no support with 100% unloading.  Patient has good form/understanding with there-ex and keeps body in good alignment.  Continue with core stability while performing dyn activity to decrease back pain.  Patient verified by name and .    Plan  " Continue with core stability to improve sleep, standing, ambulation.  Treatment/Interventions: Aquatic therapy, Cryotherapy, Dry needling, Education/ Instruction, Electrical stimulation, Hot pack, Manual therapy, Therapeutic exercises, Ultrasound (Manual Therapy including IASTM as needed)  PT Plan: Skilled PT  PT Frequency:  (2x/wk for 4 weeks or 9 sessions)  Rehab Potential: Good  Plan of Care Agreement: Patient (Patient Goal:  Improve pain and be more active with less pain)    Active       PT Problem       PT Goal 1       Start:  24    Expected End:  24       LTG's:    1) Improve Lumbar AROM from Alvarenga 25% Ext and b/l SB   to No Significant Limitation in all planes of deficit in order to facilitate improved gait and mobility.       4-6 weeks      2) Improve Core and L hip/knee  strength from  4/5 to >= 5-/5 throughout in order to facilitate safe gait and mobility.         4-6 weeks      3) Improve Modified Oswestry score by >= 5 points in order to improve QOL.     4-6 weeks      4) Improve LBP from  9/10 to <= 3 /10 with activity in order to improve QOL.      4-6 weeks      5) Pt will be able to walk longer distances, lift/carry objects and perform standing activity in the home without significant limitation       4-6 weeks      ST)  Pt/caregiver will be I and consistent with HEP in order to maximize strength and flexibility.    2-3 weeks

## 2024-04-29 ENCOUNTER — TREATMENT (OUTPATIENT)
Dept: PHYSICAL THERAPY | Facility: CLINIC | Age: 64
End: 2024-04-29
Payer: COMMERCIAL

## 2024-04-29 DIAGNOSIS — M48.062 NEUROGENIC CLAUDICATION DUE TO LUMBAR SPINAL STENOSIS: ICD-10-CM

## 2024-04-29 DIAGNOSIS — M51.26 DISPLACEMENT OF LUMBAR INTERVERTEBRAL DISC WITHOUT MYELOPATHY: ICD-10-CM

## 2024-04-29 DIAGNOSIS — M47.816 LUMBAR SPONDYLOSIS: ICD-10-CM

## 2024-04-29 PROCEDURE — 97113 AQUATIC THERAPY/EXERCISES: CPT | Mod: GP,CQ | Performed by: PHYSICAL THERAPY ASSISTANT

## 2024-04-29 ASSESSMENT — PAIN SCALES - GENERAL: PAINLEVEL_OUTOF10: 4

## 2024-04-29 ASSESSMENT — PAIN - FUNCTIONAL ASSESSMENT: PAIN_FUNCTIONAL_ASSESSMENT: 0-10

## 2024-04-29 NOTE — PROGRESS NOTES
"Physical Therapy    Physical Therapy Treatment    Patient Name: Caitlin Hogan  MRN: [id number]  Today's Date: 2024  Time Calculation  Start Time: 1215  Stop Time: 1300  Time Calculation (min): 45 min  PT Therapeutic Procedures Time Entry  Aquatic Therapy Time Entry: 43                   Current Problem  Problem List Items Addressed This Visit             ICD-10-CM       Musculoskeletal and Injuries    Neurogenic claudication due to lumbar spinal stenosis M48.062       Neuro    Displacement of lumbar intervertebral disc without myelopathy M51.26    Lumbar spondylosis M47.816        General  Reason for Referral: Low Back issue  Referred By: Graeme Friedman MD  General Comment: VISIT     Subjective    Patient reports no falls and states low back pain of 4/10 with radiating pain down left LE to knee.  Precautions  Precautions  Precautions Comment: Lower Fall.  PMH:  No significant PMH    Pain  Pain Assessment: 0-10  Pain Score: 4  Pain Type: Chronic pain  Pain Location: Back  Pain Orientation: Lower    Objective   All exercises performed in 65-70% WBing unless noted  TrA contraction review 10 x 5” holds  Side Stepping 3 laps  Hip Extention x12  Hip Abduction x12  Alt HS Curl x12  Heel Raises x12  Squats x 12  UE paddles Open x20  -flex/ext, push/pull, habd/hadd, abd/add  Dumbbell Rolls x 30\" ea. Dir sm. Blue board  Paddle Board-push/pull, flex/ext  x 12 ea.  100% unloading 1 lrg noodle,  no support at pool ladder  -Skiing 5'  -hip abd 5'  -hang 5'  Gradual exit 3'      Treatments:    OP Education      Assessment  Patient tolerated treatment without increased pain. Patient has good tra and keeps body in good alignment.  Patient has good form/understanding with there-ex.  Patient needing no UE support with LE there-ex or with 100% unloading.  Continue with core strength while performing dyn activity to decrease back pain.  Patient verified by name and .    Plan  Continue with core strength to improve sleep, " standing, ambulation.  Treatment/Interventions: Aquatic therapy, Cryotherapy, Dry needling, Education/ Instruction, Electrical stimulation, Hot pack, Manual therapy, Therapeutic exercises, Ultrasound (Manual Therapy including IASTM as needed)  PT Plan: Skilled PT  PT Frequency:  (2x/wk for 4 weeks or 9 sessions)  Rehab Potential: Good  Plan of Care Agreement: Patient (Patient Goal:  Improve pain and be more active with less pain)    Active       PT Problem       PT Goal 1       Start:  24    Expected End:  24       LTG's:    1) Improve Lumbar AROM from [place] 25% Ext and b/l SB   to No Significant Limitation in all planes of deficit in order to facilitate improved gait and mobility.       4-6 weeks      2) Improve Core and L hip/knee  strength from  4/5 to >= 5-/5 throughout in order to facilitate safe gait and mobility.         4-6 weeks      3) Improve Modified Oswestry score by >= 5 points in order to improve QOL.     4-6 weeks      4) Improve LBP from  9/10 to <= 3 /10 with activity in order to improve QOL.      4-6 weeks      5) Pt will be able to walk longer distances, lift/carry objects and perform standing activity in the home without significant limitation       4-6 weeks      ST)  Pt/caregiver will be I and consistent with HEP in order to maximize strength and flexibility.    2-3 weeks

## 2024-05-01 ENCOUNTER — TREATMENT (OUTPATIENT)
Dept: PHYSICAL THERAPY | Facility: CLINIC | Age: 64
End: 2024-05-01
Payer: COMMERCIAL

## 2024-05-01 DIAGNOSIS — M47.816 LUMBAR SPONDYLOSIS: ICD-10-CM

## 2024-05-01 DIAGNOSIS — M48.062 NEUROGENIC CLAUDICATION DUE TO LUMBAR SPINAL STENOSIS: Primary | ICD-10-CM

## 2024-05-01 DIAGNOSIS — M51.26 DISPLACEMENT OF LUMBAR INTERVERTEBRAL DISC WITHOUT MYELOPATHY: ICD-10-CM

## 2024-05-01 PROCEDURE — 97113 AQUATIC THERAPY/EXERCISES: CPT | Mod: GP,CQ | Performed by: PHYSICAL THERAPY ASSISTANT

## 2024-05-01 ASSESSMENT — PAIN - FUNCTIONAL ASSESSMENT: PAIN_FUNCTIONAL_ASSESSMENT: 0-10

## 2024-05-01 ASSESSMENT — PAIN SCALES - GENERAL: PAINLEVEL_OUTOF10: 4

## 2024-05-01 NOTE — PROGRESS NOTES
"Physical Therapy    Physical Therapy Treatment    Patient Name: Caitlin Hogan  MRN: [id number]  Today's Date: 5/1/2024  Time Calculation  Start Time: 1000  Stop Time: 1045  Time Calculation (min): 45 min  PT Therapeutic Procedures Time Entry  Aquatic Therapy Time Entry: 43                   Current Problem  Problem List Items Addressed This Visit             ICD-10-CM       Musculoskeletal and Injuries    Neurogenic claudication due to lumbar spinal stenosis - Primary M48.062       Neuro    Displacement of lumbar intervertebral disc without myelopathy M51.26    Lumbar spondylosis M47.816        General  Reason for Referral: Low Back issue  Referred By: Graeme Friedman MD  General Comment: VISIT 5/9    Subjective   Patient reports no falls and states low back pain  of 4/10.  Patient reports  sitting in [place] at restaurant, and feels it has aggravated her back.    Precautions  Precautions  Precautions Comment: Lower Fall.  PMH:  No significant PMH    Pain  Pain Assessment: 0-10  Pain Score: 4  Pain Type: Chronic pain  Pain Location: Back  Pain Orientation: Lower    Objective   All exercises performed in 65-70% WBing unless noted  TrA contraction review 10 x 5” holds  Side Stepping 3 laps  Hip Extention x15  Hip Abduction x15  Alt HS Curl x15  Heel Raises x15  Squats x 15  Step Taps x 15  for extension x 15  UE paddles Open x20  -flex/ext, push/pull, habd/hadd, abd/add  Dumbbell Rolls x 30\" ea. Dir sm. Blue board  Paddle Board-push/pull, flex/ext  x 15 ea.  100% unloading 1 lrg noodle,  no support at pool ladder  -Skiing 5'  -hip abd 5'  -hang 5'  Gradual exit 3'      Treatments:    OP Education      Assessment  Patient tolerated treatment without increased pain.  Patient has good TrA and keeps intact with there-ex.  Patient needing no UE support with LE there-ex or with 100% unloading.  Patient has good form/understanding with there-ex and keeps body in good alignment.  Continue with core stability while performing " dyn activity to decrease back pain.  Patient verified by name and .    Plan Continue with core stability to improve sleep, standing, ambulation.  Treatment/Interventions: Aquatic therapy, Cryotherapy, Dry needling, Education/ Instruction, Electrical stimulation, Hot pack, Manual therapy, Therapeutic exercises, Ultrasound (Manual Therapy including IASTM as needed)  PT Plan: Skilled PT  PT Frequency:  (2x/wk for 4 weeks or 9 sessions)  Rehab Potential: Good  Plan of Care Agreement: Patient (Patient Goal:  Improve pain and be more active with less pain)    Active       PT Problem       PT Goal 1       Start:  24    Expected End:  24       LTG's:    1) Improve Lumbar AROM from [place] 25% Ext and b/l SB   to No Significant Limitation in all planes of deficit in order to facilitate improved gait and mobility.       4-6 weeks      2) Improve Core and L hip/knee  strength from  4/5 to >= 5-/5 throughout in order to facilitate safe gait and mobility.         4-6 weeks      3) Improve Modified Oswestry score by >= 5 points in order to improve QOL.     4-6 weeks      4) Improve LBP from  9/10 to <= 3 /10 with activity in order to improve QOL.      4-6 weeks      5) Pt will be able to walk longer distances, lift/carry objects and perform standing activity in the home without significant limitation       4-6 weeks      ST)  Pt/caregiver will be I and consistent with HEP in order to maximize strength and flexibility.    2-3 weeks

## 2024-05-07 ENCOUNTER — APPOINTMENT (OUTPATIENT)
Dept: PHYSICAL THERAPY | Facility: CLINIC | Age: 64
End: 2024-05-07
Payer: COMMERCIAL

## 2024-05-08 ENCOUNTER — TREATMENT (OUTPATIENT)
Dept: PHYSICAL THERAPY | Facility: CLINIC | Age: 64
End: 2024-05-08
Payer: COMMERCIAL

## 2024-05-08 DIAGNOSIS — M48.062 NEUROGENIC CLAUDICATION DUE TO LUMBAR SPINAL STENOSIS: ICD-10-CM

## 2024-05-08 DIAGNOSIS — M47.816 LUMBAR SPONDYLOSIS: ICD-10-CM

## 2024-05-08 DIAGNOSIS — M51.26 DISPLACEMENT OF LUMBAR INTERVERTEBRAL DISC WITHOUT MYELOPATHY: ICD-10-CM

## 2024-05-08 PROCEDURE — 97113 AQUATIC THERAPY/EXERCISES: CPT | Mod: GP,CQ | Performed by: PHYSICAL THERAPY ASSISTANT

## 2024-05-08 ASSESSMENT — PAIN - FUNCTIONAL ASSESSMENT: PAIN_FUNCTIONAL_ASSESSMENT: 0-10

## 2024-05-08 ASSESSMENT — PAIN SCALES - GENERAL: PAINLEVEL_OUTOF10: 4

## 2024-05-09 DIAGNOSIS — G47.00 INSOMNIA, UNSPECIFIED TYPE: ICD-10-CM

## 2024-05-09 RX ORDER — ZOLPIDEM TARTRATE 5 MG/1
5 TABLET ORAL NIGHTLY PRN
Qty: 30 TABLET | Refills: 0 | Status: SHIPPED | OUTPATIENT
Start: 2024-05-09 | End: 2024-06-07 | Stop reason: SDUPTHER

## 2024-05-14 ENCOUNTER — PREP FOR PROCEDURE (OUTPATIENT)
Dept: PAIN MEDICINE | Facility: CLINIC | Age: 64
End: 2024-05-14
Payer: COMMERCIAL

## 2024-05-14 DIAGNOSIS — M54.16 LUMBAR RADICULOPATHY: Primary | ICD-10-CM

## 2024-05-14 RX ORDER — LIDOCAINE HYDROCHLORIDE 20 MG/ML
1 INJECTION, SOLUTION EPIDURAL; INFILTRATION; INTRACAUDAL; PERINEURAL ONCE
Status: CANCELLED | OUTPATIENT
Start: 2024-05-17 | End: 2024-05-14

## 2024-05-14 RX ORDER — DEXAMETHASONE SODIUM PHOSPHATE 10 MG/ML
10 INJECTION INTRAMUSCULAR; INTRAVENOUS ONCE
Status: CANCELLED | OUTPATIENT
Start: 2024-05-17 | End: 2024-05-14

## 2024-05-14 RX ORDER — SODIUM CHLORIDE 9 MG/ML
1 INJECTION, SOLUTION INTRAMUSCULAR; INTRAVENOUS; SUBCUTANEOUS ONCE
Status: CANCELLED | OUTPATIENT
Start: 2024-05-17 | End: 2024-05-14

## 2024-05-14 RX ORDER — LIDOCAINE HYDROCHLORIDE 20 MG/ML
6 INJECTION, SOLUTION EPIDURAL; INFILTRATION; INTRACAUDAL; PERINEURAL ONCE
Status: CANCELLED | OUTPATIENT
Start: 2024-05-17 | End: 2024-05-14

## 2024-05-15 ENCOUNTER — TREATMENT (OUTPATIENT)
Dept: PHYSICAL THERAPY | Facility: CLINIC | Age: 64
End: 2024-05-15
Payer: COMMERCIAL

## 2024-05-15 DIAGNOSIS — M48.062 NEUROGENIC CLAUDICATION DUE TO LUMBAR SPINAL STENOSIS: ICD-10-CM

## 2024-05-15 DIAGNOSIS — M51.26 DISPLACEMENT OF LUMBAR INTERVERTEBRAL DISC WITHOUT MYELOPATHY: ICD-10-CM

## 2024-05-15 DIAGNOSIS — M47.816 LUMBAR SPONDYLOSIS: ICD-10-CM

## 2024-05-15 PROCEDURE — 97113 AQUATIC THERAPY/EXERCISES: CPT | Mod: GP,CQ | Performed by: PHYSICAL THERAPY ASSISTANT

## 2024-05-15 ASSESSMENT — PAIN - FUNCTIONAL ASSESSMENT: PAIN_FUNCTIONAL_ASSESSMENT: 0-10

## 2024-05-15 ASSESSMENT — PAIN SCALES - GENERAL: PAINLEVEL_OUTOF10: 4

## 2024-05-15 NOTE — PROGRESS NOTES
"Physical Therapy    Physical Therapy Treatment    Patient Name: Caitlin Hogan  MRN: [id number]  Today's Date: 5/15/2024  Time Calculation  Start Time: 0915  Stop Time: 1000  Time Calculation (min): 45 min  PT Therapeutic Procedures Time Entry  Aquatic Therapy Time Entry: 43                   Current Problem  Problem List Items Addressed This Visit             ICD-10-CM       Musculoskeletal and Injuries    Neurogenic claudication due to lumbar spinal stenosis M48.062       Neuro    Displacement of lumbar intervertebral disc without myelopathy M51.26    Lumbar spondylosis M47.816        General  Reason for Referral: Low Back issue  Referred By: Graeme Friedman MD  General Comment: VISIT 7/9    Subjective  Patient reports no falls and states low back pain of 4/10 with pain radiating down left LE to knee.      Precautions  Precautions  Precautions Comment: Lower Fall.  PMH:  No significant PMH    Pain  Pain Assessment: 0-10  Pain Score: 4  Pain Type: Chronic pain  Pain Location: Back  Pain Orientation: Lower    Objective   All exercises performed in 65-70% WBing unless noted  TrA contraction review 10 x 5” holds  Side Stepping 3 laps  Hip Extention x15  Hip Abduction x15  Alt HS Curl x15  Heel Raises x15  Squats x 15  Step Taps x 15  for extension   UE paddles Open x20  -flex/ext, push/pull, habd/hadd, abd/add  Dumbbell Rolls x 30\" ea. Dir   [place]  Paddle Board-push/pull, flex/ext  x 15 ea. Sm. Blue board  100% unloading 1 lrg noodle,  no support at pool ladder  -Skiing 5'  -hip abd 5'  -hang 5'  Gradual exit 3'    Treatments:    OP Education      Assessment Patient tolerated treatment without increased pain.  Patient has good Tra and keeps intact with there-ex.  Patient has good form/understanding with there-ex and keeps body in good alignment.  Patient needing no UE support with LE there-ex or with 100% unloading.  Continue with core stability while performing dyn activity to decrease back pain.  Patient verified " Airway  Performed by: Maximiliano Swift MD  Authorized by: Maximiliano Swift MD     Final Airway Type:  Supraglottic airway  Final Supraglottic Airway:  IGel  SGA Size*:  5  Attempts*:  1  Number of Other Approaches Attempted:  0   Patient Identified, Procedure confirmed, Emergency equipment available and Safety protocols followed  Location:  OR  Urgency:  Elective  Difficult Airway: No    Indications for Airway Management:  Anesthesia  Spontaneous Ventilation: absent    Sedation Level:  Anesthetized  Mask Difficulty Assessment:  1 - vent by mask  Performed By:  Anesthesiologist  Anesthesiologist:  Maximiliano Swift MD  Start Time: 3/3/2023 2:27 PM     by name and .    Plan Continue with core stability to improve sleep, standing, ambulation.  Treatment/Interventions: Aquatic therapy, Cryotherapy, Dry needling, Education/ Instruction, Electrical stimulation, Hot pack, Manual therapy, Therapeutic exercises, Ultrasound (Manual Therapy including IASTM as needed)  PT Plan: Skilled PT  PT Frequency:  (2x/wk for 4 weeks or 9 sessions)  Rehab Potential: Good  Plan of Care Agreement: Patient (Patient Goal:  Improve pain and be more active with less pain)    Active       PT Problem       PT Goal 1       Start:  24    Expected End:  24       LTG's:    1) Improve Lumbar AROM from [place] 25% Ext and b/l SB   to No Significant Limitation in all planes of deficit in order to facilitate improved gait and mobility.       4-6 weeks      2) Improve Core and L hip/knee  strength from  4/5 to >= 5-/5 throughout in order to facilitate safe gait and mobility.         4-6 weeks      3) Improve Modified Oswestry score by >= 5 points in order to improve QOL.     4-6 weeks      4) Improve LBP from  9/10 to <= 3 /10 with activity in order to improve QOL.      4-6 weeks      5) Pt will be able to walk longer distances, lift/carry objects and perform standing activity in the home without significant limitation       4-6 weeks      ST)  Pt/caregiver will be I and consistent with HEP in order to maximize strength and flexibility.    2-3 weeks

## 2024-05-17 ENCOUNTER — HOSPITAL ENCOUNTER (OUTPATIENT)
Dept: OPERATING ROOM | Facility: HOSPITAL | Age: 64
Setting detail: OUTPATIENT SURGERY
Discharge: HOME | End: 2024-05-17
Payer: COMMERCIAL

## 2024-05-17 ENCOUNTER — HOSPITAL ENCOUNTER (OUTPATIENT)
Dept: RADIOLOGY | Facility: HOSPITAL | Age: 64
Setting detail: OUTPATIENT SURGERY
Discharge: HOME | End: 2024-05-17
Payer: COMMERCIAL

## 2024-05-17 ENCOUNTER — DOCUMENTATION (OUTPATIENT)
Dept: PHYSICAL THERAPY | Facility: CLINIC | Age: 64
End: 2024-05-17
Payer: COMMERCIAL

## 2024-05-17 VITALS
RESPIRATION RATE: 16 BRPM | HEIGHT: 64 IN | BODY MASS INDEX: 21.34 KG/M2 | OXYGEN SATURATION: 99 % | HEART RATE: 62 BPM | SYSTOLIC BLOOD PRESSURE: 147 MMHG | WEIGHT: 125 LBS | DIASTOLIC BLOOD PRESSURE: 58 MMHG | TEMPERATURE: 98.6 F

## 2024-05-17 DIAGNOSIS — M54.16 LUMBAR RADICULOPATHY: ICD-10-CM

## 2024-05-17 PROCEDURE — 7100000010 HC PHASE TWO TIME - EACH INCREMENTAL 1 MINUTE

## 2024-05-17 PROCEDURE — 7100000009 HC PHASE TWO TIME - INITIAL BASE CHARGE

## 2024-05-17 PROCEDURE — 2550000001 HC RX 255 CONTRASTS: Performed by: PHYSICIAN ASSISTANT

## 2024-05-17 PROCEDURE — 2500000005 HC RX 250 GENERAL PHARMACY W/O HCPCS: Performed by: PHYSICIAN ASSISTANT

## 2024-05-17 PROCEDURE — 2500000005 HC RX 250 GENERAL PHARMACY W/O HCPCS

## 2024-05-17 PROCEDURE — 64483 NJX AA&/STRD TFRM EPI L/S 1: CPT | Mod: LT | Performed by: STUDENT IN AN ORGANIZED HEALTH CARE EDUCATION/TRAINING PROGRAM

## 2024-05-17 RX ORDER — DEXAMETHASONE SODIUM PHOSPHATE 10 MG/ML
10 INJECTION INTRAMUSCULAR; INTRAVENOUS ONCE
Status: DISCONTINUED | OUTPATIENT
Start: 2024-05-17 | End: 2024-05-18 | Stop reason: HOSPADM

## 2024-05-17 RX ORDER — LIDOCAINE HYDROCHLORIDE 5 MG/ML
INJECTION, SOLUTION INFILTRATION; PERINEURAL AS NEEDED
Status: COMPLETED | OUTPATIENT
Start: 2024-05-17 | End: 2024-05-17

## 2024-05-17 RX ORDER — SODIUM CHLORIDE 9 MG/ML
1 INJECTION, SOLUTION INTRAMUSCULAR; INTRAVENOUS; SUBCUTANEOUS ONCE
Status: DISCONTINUED | OUTPATIENT
Start: 2024-05-17 | End: 2024-05-18 | Stop reason: HOSPADM

## 2024-05-17 RX ORDER — LIDOCAINE HYDROCHLORIDE 20 MG/ML
1 INJECTION, SOLUTION EPIDURAL; INFILTRATION; INTRACAUDAL; PERINEURAL ONCE
Status: DISCONTINUED | OUTPATIENT
Start: 2024-05-17 | End: 2024-05-18 | Stop reason: HOSPADM

## 2024-05-17 RX ORDER — LIDOCAINE HYDROCHLORIDE 20 MG/ML
6 INJECTION, SOLUTION EPIDURAL; INFILTRATION; INTRACAUDAL; PERINEURAL ONCE
Status: COMPLETED | OUTPATIENT
Start: 2024-05-17 | End: 2024-05-17

## 2024-05-17 RX ADMIN — IOHEXOL 3 ML: 300 INJECTION, SOLUTION INTRAVENOUS at 13:55

## 2024-05-17 RX ADMIN — LIDOCAINE HYDROCHLORIDE 3 ML: 5 INJECTION, SOLUTION INFILTRATION; PERINEURAL at 13:55

## 2024-05-17 RX ADMIN — LIDOCAINE HYDROCHLORIDE 5 ML: 20 INJECTION, SOLUTION EPIDURAL; INFILTRATION; INTRACAUDAL; PERINEURAL at 13:55

## 2024-05-17 ASSESSMENT — PAIN SCALES - GENERAL
PAINLEVEL_OUTOF10: 5 - MODERATE PAIN
PAINLEVEL_OUTOF10: 5 - MODERATE PAIN

## 2024-05-17 ASSESSMENT — PAIN - FUNCTIONAL ASSESSMENT
PAIN_FUNCTIONAL_ASSESSMENT: 0-10
PAIN_FUNCTIONAL_ASSESSMENT: 0-10

## 2024-05-17 ASSESSMENT — COLUMBIA-SUICIDE SEVERITY RATING SCALE - C-SSRS
6. HAVE YOU EVER DONE ANYTHING, STARTED TO DO ANYTHING, OR PREPARED TO DO ANYTHING TO END YOUR LIFE?: NO
2. HAVE YOU ACTUALLY HAD ANY THOUGHTS OF KILLING YOURSELF?: NO
1. IN THE PAST MONTH, HAVE YOU WISHED YOU WERE DEAD OR WISHED YOU COULD GO TO SLEEP AND NOT WAKE UP?: NO

## 2024-05-17 NOTE — PERIOPERATIVE NURSING NOTE
Discharge instructions reviewed by Kavitha SAHU RN   no questions and verbalized understanding. Pt  discharged amb to exit steady gait,  to be driven home by , issac well

## 2024-05-17 NOTE — PROGRESS NOTES
Physical Therapy                 Therapy Communication Note    Patient Name: Caitlin Hogan  MRN: [id number]  Today's Date: 5/17/2024     Discipline: Physical Therapy    Missed Visit Reason:      Missed Time: No Show    Comment:

## 2024-05-17 NOTE — NURSING NOTE
ARRIVED TO ROOM PER CART AND MOVED SELF TO TABLE.  PATIENT IN PRONE POSITION PADDED WITH PILLOWS, SAFETY STRAP APPLIED, PREPPED WITH TEAL CHLOROPREP PER DR POWELL,  FIRE RISK SCORE 2, OPSITE DRESSED WITH BANDAID, REPORT GIVEN TO CRISTINA CHAVEZ RN.

## 2024-05-17 NOTE — OP NOTE
* No procedures listed * Operative Note     Date: 2024  OR Location: [place] [address] OR    Name: Caitlin Hogan, : 1960, Age: 63 y.o., MRN: [id number], Sex: female    Diagnosis  * No Diagnosis Codes entered * * No Diagnosis Codes entered *     Procedures  * No procedures documented on diagnosis form *    Surgeons   * No surgeons found in log *    Resident/Fellow/Other Assistant:  * No surgeons found in log *    Procedure Summary  Anesthesia: * No anesthesia type entered *  ASA: ASA status not filed in the log.  Anesthesia Staff: No anesthesia staff entered.  Estimated Blood Loss: 0mL  Intra-op Medications: * Intraprocedure medication information is unavailable because the case start and end events have not been set *      Intraprocedure I/O Totals       None           Specimen: No specimens collected     Staff:   Circulator: Tyshawn Blanco RN; Stacy Cam RN  Scrub Person: Linette Ram RN         Drains and/or Catheters: * None in log *    Tourniquet Times:         Implants:     Findings:     Indications: Caitlin Hogan is an 63 y.o. female who is having surgery for * No pre-op diagnosis entered *. pain    The patient was seen in the preoperative area. The risks, benefits, complications, treatment options, non-operative alternatives, expected recovery and outcomes were discussed with the patient. The possibilities of reaction to medication, pulmonary aspiration, injury to surrounding structures, bleeding, recurrent infection, the need for additional procedures, failure to diagnose a condition, and creating a complication requiring transfusion or operation were discussed with the patient. The patient concurred with the proposed plan, giving informed consent.  The site of surgery was properly noted/marked if necessary per policy. The patient has been actively warmed in preoperative area. Preoperative antibiotics are not indicated. Venous thrombosis prophylaxis are not indicated.    Procedure  Details: Diagnosis: M54.16, lumbar radiculopathy    Procedure: Left L3/4 and L4/5 transforaminal epidural steroid injections under fluoroscopic guidance    Anesthesia: Local    Complications: None    After informed consent was obtained, the patient was brought to the procedure suite and placed in the prone position.  Pulse oximetry and blood pressure were monitored throughout.  The low back area was prepped and draped in the usual sterile fashion.  Using fluoroscopic guidance, the skin and subcutaneous tissue overlying the needle trajectory of the neuroforamina were anesthetized with 2.0% lidocaine.  The 22-gauge Jimbo needles were then advanced under fluoroscopic guidance into the foramina.  Needle tip positions were confirmed in at least two views.  Injection of Omnipaque contrast revealed appropriate spread of the dye without vascular uptake.  Next, at each site, 1.5 mL of 0.5% lidocaine and 5 mg dexamethasone were injected in divided doses through each needle tip.  The needles were removed and the patient was then transferred to the recovery room in stable condition.  The patient tolerated the procedure well.  There were no apparent complications.     FOLLOW UP:  The patient will update us on their response to this procedure, and agrees to continue currently prescribed/recommended therapies.    Complications:  None; patient tolerated the procedure well.    Disposition:  home  Condition: stable         Additional Details:      Attending Attestation: I was present and scrubbed for the entire procedure.    *No primary surgeon found*

## 2024-05-17 NOTE — H&P
History Of Present Illness  Caitlin Hogan is a 63 y.o. female presenting with lumbar radic.     Past Medical History  Past Medical History:   Diagnosis Date    Abdominal distension (gaseous) 2021    Bloating    ADHD (attention deficit hyperactivity disorder)     Allergic     Anxiety     Arthritis     Depression     Disease of thyroid gland     Hypertension     Personal history of other diseases of the circulatory system 2021    History of abnormal electrocardiography    Personal history of other diseases of the respiratory system 2021    History of acute sinusitis    Personal history of other diseases of urinary system 2021    History of hematuria    Personal history of other endocrine, nutritional and metabolic disease     History of thyroid disorder    Unspecified abdominal pain 2021    Chronic abdominal pain       Surgical History  Past Surgical History:   Procedure Laterality Date     SECTION, LOW TRANSVERSE      HYSTERECTOMY      OTHER SURGICAL HISTORY  10/23/2019    Bursectomy    OTHER SURGICAL HISTORY  2017    Colonoscopy    OTHER SURGICAL HISTORY  10/23/2019    Colon surgery    OTHER SURGICAL HISTORY  10/23/2019     section    OTHER SURGICAL HISTORY  10/23/2019    Hysterectomy total    OTHER SURGICAL HISTORY  10/01/2021    Epidural steroid injection    OTHER SURGICAL HISTORY  2019    Epidural steroid injection    SMALL INTESTINE SURGERY          Social History  She reports that she has quit smoking. Her smoking use included cigarettes. She has never used smokeless tobacco. She reports current alcohol use of about 2.0 standard drinks of alcohol per week. She reports that she does not use drugs.    Family History  Family History   Problem Relation Name Age of Onset    Hypertension Mother Lexus     Hypertension Father Tristan     COPD Father Tristan     Heart disease Father Tristan     Aneurysm Father Tristan     Hypertension Sister Steafnia     Deep vein thrombosis  Sister Stefania     Ovarian cancer Sister Stefania     Cancer Brother Elio     Colon cancer Brother Elio     Other (bladder cancer) Brother Elio     Cancer Sister Lexus     Colon cancer Sister Lexus         Allergies  Cephalexin    Review of Systems   All other systems reviewed and are negative.       Physical Exam     Last Recorded Vitals  There were no vitals taken for this visit.    Relevant Results        Constitutional: No acute distress, well appearing and well nourished. Patient appears stated age.  Eyes:  nonicteric sclerae   ENT: Hearing is grossly intact.  Neck: trachea midline  Head and Face: grossly normal.  Respiratory: nonlabored breathing  Cardiovascular: rate per vitals.  Neuro: alert, moving extremities.       Assessment/Plan   Active Problems:  There are no active Hospital Problems.      Left l3/4 and l4/5 tfesi       I spent   minutes in the professional and overall care of this patient.      Leo Rodríguez, DO

## 2024-05-20 ENCOUNTER — TREATMENT (OUTPATIENT)
Dept: PHYSICAL THERAPY | Facility: CLINIC | Age: 64
End: 2024-05-20
Payer: COMMERCIAL

## 2024-05-20 DIAGNOSIS — M54.2 CERVICALGIA: ICD-10-CM

## 2024-05-20 DIAGNOSIS — M47.816 LUMBAR SPONDYLOSIS: ICD-10-CM

## 2024-05-20 DIAGNOSIS — M51.26 DISPLACEMENT OF LUMBAR INTERVERTEBRAL DISC WITHOUT MYELOPATHY: ICD-10-CM

## 2024-05-20 DIAGNOSIS — M48.062 NEUROGENIC CLAUDICATION DUE TO LUMBAR SPINAL STENOSIS: ICD-10-CM

## 2024-05-20 PROCEDURE — 97110 THERAPEUTIC EXERCISES: CPT | Mod: GP | Performed by: PHYSICAL THERAPIST

## 2024-05-20 RX ORDER — HYDROCODONE BITARTRATE AND ACETAMINOPHEN 5; 325 MG/1; MG/1
1 TABLET ORAL 3 TIMES DAILY
Qty: 90 TABLET | Refills: 0 | Status: SHIPPED | OUTPATIENT
Start: 2024-05-20

## 2024-05-20 ASSESSMENT — PAIN SCALES - GENERAL: PAINLEVEL_OUTOF10: 4

## 2024-05-20 ASSESSMENT — PAIN - FUNCTIONAL ASSESSMENT: PAIN_FUNCTIONAL_ASSESSMENT: 0-10

## 2024-05-20 NOTE — PROGRESS NOTES
Physical Therapy Reassessment/Treatment    Patient Name: Caitlin Hogan  MRN: [id number]  Today's Date: 5/20/2024  Time Calculation  Start Time: 0930  Stop Time: 1000  Time Calculation (min): 30 min      PT Therapeutic Procedures Time Entry  Therapeutic Exercise Time Entry: 25                         General  Reason for Referral: Low Back issue  Referred By: Graeme Friedman MD  General Comment: VISIT 8 or 0/2    Assessment:  Pt is making some objective progress in PT with improved lumbar/core strength and and ROM/flexibility.  The pt is I with her current HEP for aquatic ex's and is making progress towards meeting her PT goals.  The pt still has significant LBP that comes and goes and would like to continue PT for a few more sessions to learn a land based HEP.  Continue PT as per current POC 1-2x/wk for 2 more sessions.    Plan:  OP PT Plan  Treatment/Interventions: Aquatic therapy, Cryotherapy, Dry needling, Education/ Instruction, Electrical stimulation, Hot pack, Manual therapy, Therapeutic exercises, Ultrasound (Manual Therapy including IASTM as needed)  PT Plan: Skilled PT  PT Frequency:  (1-2 times/wk for 2 more sessions)  Rehab Potential: Good  Plan of Care Agreement: Patient (Patient Goal:  Improve pain and be more active with less pain)    5/20/2024 PT Reassessment:    Pt is making some objective progress in PT with improved lumbar/core strength and and ROM/flexibility.  The pt is I with her current HEP for aquatic ex's and is making progress towards meeting her PT goals.  The pt still has significant LBP that comes and goes and would like to continue PT for a few more sessions to learn a land based HEP.  Continue PT as per current POC 1-2x/wk for 2 more sessions.    Current Problem  Problem List Items Addressed This Visit             ICD-10-CM       Musculoskeletal and Injuries    Neurogenic claudication due to lumbar spinal stenosis M48.062       Neuro    Displacement of lumbar intervertebral disc without  "myelopathy M51.26    Lumbar spondylosis M47.816       Subjective  The pt reports that the aquatic rx seems to help overall but that she still has significant LBP and L radicular pain in her leg.  Pt reports that she had recent injections and still wants to get her MRI approved.  Pt reports that she would like to continue with a few more PT sessions to learn a land based HEP.      Precautions  Precautions  Precautions Comment: Lower Fall.  PMH:  No significant PMH    Pain  Pain Assessment: 0-10  Pain Score: 4  Pain Type: Chronic pain  Pain Location: Back  Pain Orientation: Lower      Treatments:  Ther Ex:  10 min   1 unit  Hooklying SKTC  5 sec hold x 5 reps ea  Hooklying Piriformis/Glut Stretch  20 sec hold x 2 ea  Hooklying Lateral Trunk Rotation  (small comfortable arc of motion)   2 min  5/20/2024 PT  Completed Today.      Aquatics:   X Today  All exercises performed in 65-70% WBing unless noted  TrA contraction review 10 x 5” holds  Side Stepping 3 laps  Hip Extention x15  Hip Abduction x15  Alt HS Curl x15  Heel Raises x15  Squats x 15  Step Taps x 15  for extension   UE paddles Open x20  -flex/ext, push/pull, habd/hadd, abd/add  Dumbbell Rolls x 30\" ea. Dir   [place]  Paddle Board-push/pull, flex/ext  x 15 ea. Sm. Blue board  100% unloading 1 lrg noodle,  no support at pool ladder  -Skiing 5'  -hip abd 5'  -hang 5'  Gradual exit 3'    OP EDUCATION:    Edu on proper form and speed of movement with ex's.  Pt verbalized/demonstrated good understanding.  PT edu pt regarding PT POC/course of treatment and HEP.  Pt was given exercise handout as a reference.  Pt verbalized good understanding.  Access Code: [email or web address]    Goals:  Active       PT Problem       PT Goal 1       Start:  04/17/24    Expected End:  06/07/24       LTG's:    1) Improve Lumbar AROM from [place] 25% Ext and b/l SB   to No Significant Limitation in all planes of deficit in order to facilitate improved gait and mobility.       4-6 weeks  5/20/2024, " PARTIALLY MET, No flexion [place], 15% ext and b/l SB    2) Improve Core and L hip/knee  strength from  4/5 to >= 5-/5 throughout in order to facilitate safe gait and mobility.         4-6 weeks  2024, PARTIALLY MET, L hip/knee strength 4+/5    3) Improve Modified Oswestry score by >= 5 points in order to improve QOL.     4-6 weeks  2024, PARTIALLY MET, Pt scored a 28% at baseline and a 36% at Reassessment.    4) Improve LBP from  9/10 to <= 3 /10 with activity in order to improve QOL.      4-6 weeks  2024, PARTIALLY MET, 2-9/10 pain range of Lower back/L leg    5) Pt will be able to walk longer distances, lift/carry objects and perform standing activity in the home without significant limitation       4-6 weeks  2024, PARTIALLY MET, pt is able to do a little better with the above activity but still has significant pain with activity.      ST)  Pt/caregiver will be I and consistent with HEP in order to maximize strength and flexibility.    2-3 weeks  2024, MET, pt is I with HEP and has handouts as a reference.

## 2024-05-21 ENCOUNTER — TREATMENT (OUTPATIENT)
Dept: PHYSICAL THERAPY | Facility: CLINIC | Age: 64
End: 2024-05-21
Payer: COMMERCIAL

## 2024-05-21 DIAGNOSIS — M47.816 LUMBAR SPONDYLOSIS: ICD-10-CM

## 2024-05-21 DIAGNOSIS — M51.26 DISPLACEMENT OF LUMBAR INTERVERTEBRAL DISC WITHOUT MYELOPATHY: ICD-10-CM

## 2024-05-21 DIAGNOSIS — M48.062 NEUROGENIC CLAUDICATION DUE TO LUMBAR SPINAL STENOSIS: ICD-10-CM

## 2024-05-21 PROCEDURE — 97110 THERAPEUTIC EXERCISES: CPT | Mod: GP | Performed by: PHYSICAL THERAPIST

## 2024-05-21 ASSESSMENT — PAIN SCALES - GENERAL: PAINLEVEL_OUTOF10: 3

## 2024-05-21 ASSESSMENT — PAIN - FUNCTIONAL ASSESSMENT: PAIN_FUNCTIONAL_ASSESSMENT: 0-10

## 2024-05-21 NOTE — PROGRESS NOTES
Physical Therapy Treatment    Patient Name: Caitlin Hogan  MRN: [id number]  Today's Date: 5/21/2024  Time Calculation  Start Time: 0920  Stop Time: 1000  Time Calculation (min): 40 min      PT Therapeutic Procedures Time Entry  Therapeutic Exercise Time Entry: 39                         General  Reason for Referral: Low Back issue  Referred By: Graeme Friedman MD  General Comment: VISIT 9 or 1/2    Assessment:  PT edu pt on land based HEP but pt had Fair-Poor tolerance to standing TrA ex's.  Pt able to tolerate laying down TrA contract relax and bridges and more neutral positioning ex's standing on both feet.  Pt able to tolerate ex's much better in aquatic environment and said she would look into [facility] to continue with aquatic ex's as able.      Plan:  OP PT Plan  Treatment/Interventions: Aquatic therapy, Cryotherapy, Dry needling, Education/ Instruction, Electrical stimulation, Hot pack, Manual therapy, Therapeutic exercises, Ultrasound, Mechanical traction (Manual Therapy including IASTM as needed)  PT Plan: Skilled PT  PT Frequency:  (1-2 times/wk for 2 more sessions)  Rehab Potential: Good  Plan of Care Agreement: Patient (Patient Goal:  Improve pain and be more active with less pain)    Plan to continue with core strengthening in extension bias to allow for improved tolerance to prolonged ambulation and sitting.     Current Problem  Problem List Items Addressed This Visit             ICD-10-CM       Musculoskeletal and Injuries    Neurogenic claudication due to lumbar spinal stenosis M48.062       Neuro    Displacement of lumbar intervertebral disc without myelopathy M51.26    Lumbar spondylosis M47.816       Subjective  Pt reports that her low back was sore and painful yesterday due to overdoing it with activity but feels good overall so far this morning.      Precautions  Precautions  Precautions Comment: Lower Fall.  PMH:  No significant PMH    Pain  Pain Assessment: 0-10  Pain Score:  "3  Pain Type: Chronic pain  Pain Location: Back  Pain Orientation: Lower      Treatments:  Ther Ex:  39 min   3 units  Sci Fit Stepper  Lv 1  5 min  Slant Board Gastroc Stretch  2 x 1 min holds  Standing 3-way hip with TrA (flex/ext/abd)  2 x 10 reps ea  N  Standing B/L HS Curls with TrA   x 15 reps ea N  B/L Mini Squats  2 x 10 reps  N  B/L Heel Raises  2 x 10 reps  N  TrA- Standing Resistive Rowing with TrA-  2 x 10 reps  Magenta Tubing  N  TrA- Standing Resistive Shoulder Extension with TrA-  2 x 10 reps  Pink Tubing  N  TrA- Pilat Press  Blue Band  x 15 reps ea  N  TrA- Resistive Lateral Trunk Rotation  x 15 reps ea  Blue Band  N  Hooklying TrA contract/relex  5 sec hold x 10 reps  N  Hooklying TrA- bridge  x 10 reps  N  HEP instruction with handout given.    Hooklying SKTC  5 sec hold x 5 reps ea  Hooklying Piriformis/Glut Stretch  20 sec hold x 2 ea  Hooklying Lateral Trunk Rotation  (small comfortable arc of motion)   2 min          Aquatics:   X Today  All exercises performed in 65-70% WBing unless noted  TrA contraction review 10 x 5” holds  Side Stepping 3 laps  Hip Extention x15  Hip Abduction x15  Alt HS Curl x15  Heel Raises x15  Squats x 15  Step Taps x 15  for extension   UE paddles Open x20  -flex/ext, push/pull, habd/hadd, abd/add  Dumbbell Rolls x 30\" ea. Dir   [place]  Paddle Board-push/pull, flex/ext  x 15 ea. Sm. Blue board  100% unloading 1 lrg noodle,  no support at pool ladder  -Skiing 5'  -hip abd 5'  -hang 5'  Gradual exit 3'    OP EDUCATION:   PT edu pt on HEP and gave handout today.  Pt verbalized good understanding.      Goals:  Active       PT Problem       PT Goal 1       Start:  04/17/24    Expected End:  06/07/24       LTG's:    1) Improve Lumbar AROM from [place] 25% Ext and b/l SB   to No Significant Limitation in all planes of deficit in order to facilitate improved gait and mobility.       4-6 weeks      2) Improve Core and L hip/knee  strength from  4/5 to >= 5-/5 throughout in order " to facilitate safe gait and mobility.         4-6 weeks      3) Improve Modified Oswestry score by >= 5 points in order to improve QOL.     4-6 weeks      4) Improve LBP from  9/10 to <= 3 /10 with activity in order to improve QOL.      4-6 weeks      5) Pt will be able to walk longer distances, lift/carry objects and perform standing activity in the home without significant limitation       4-6 weeks      ST)  Pt/caregiver will be I and consistent with HEP in order to maximize strength and flexibility.    2-3 weeks

## 2024-05-29 ENCOUNTER — TREATMENT (OUTPATIENT)
Dept: PHYSICAL THERAPY | Facility: CLINIC | Age: 64
End: 2024-05-29
Payer: COMMERCIAL

## 2024-05-29 ENCOUNTER — TELEPHONE (OUTPATIENT)
Dept: PAIN MEDICINE | Facility: CLINIC | Age: 64
End: 2024-05-29
Payer: COMMERCIAL

## 2024-05-29 DIAGNOSIS — M47.816 LUMBAR SPONDYLOSIS: ICD-10-CM

## 2024-05-29 DIAGNOSIS — M48.062 NEUROGENIC CLAUDICATION DUE TO LUMBAR SPINAL STENOSIS: ICD-10-CM

## 2024-05-29 DIAGNOSIS — M51.26 DISPLACEMENT OF LUMBAR INTERVERTEBRAL DISC WITHOUT MYELOPATHY: ICD-10-CM

## 2024-05-29 DIAGNOSIS — M54.12 CERVICAL RADICULITIS: ICD-10-CM

## 2024-05-29 PROCEDURE — 97110 THERAPEUTIC EXERCISES: CPT | Mod: GP | Performed by: PHYSICAL THERAPIST

## 2024-05-29 RX ORDER — BACLOFEN 10 MG/1
10 TABLET ORAL 3 TIMES DAILY
Qty: 90 TABLET | Refills: 0 | Status: SHIPPED | OUTPATIENT
Start: 2024-05-29

## 2024-05-29 ASSESSMENT — PAIN SCALES - GENERAL: PAINLEVEL_OUTOF10: 5 - MODERATE PAIN

## 2024-05-29 ASSESSMENT — PAIN - FUNCTIONAL ASSESSMENT: PAIN_FUNCTIONAL_ASSESSMENT: 0-10

## 2024-05-29 NOTE — PROGRESS NOTES
Physical Therapy Discharge/Treatment    Patient Name: Caitlin Hogan  MRN: [id number]  Today's Date: 5/29/2024  Time Calculation  Start Time: 0900  Stop Time: 0927  Time Calculation (min): 27 min      PT Therapeutic Procedures Time Entry  Therapeutic Exercise Time Entry: 25                         General  Reason for Referral: Low Back issue  Referred By: Graeme Friedman MD  General Comment: VISIT 10    Assessment:  PT Assessment  PT Assessment Results: Decreased strength, Decreased range of motion, Decreased mobility, Pain  Rehab Prognosis: Good  Barriers to Discharge: None    5/29/2024 PT DC Summary:   The pt has made some mild objective progress in PT with improved lumbar/core/LE  strength, ROM/flexibility.  The pt has PARTIALLY MET most of her PT goals and is I with current HEP. Pt continues to have severe LBP and LE radicular pain/symptoms.  DC PT at this time.  Follow up with MD for further diagnostic testing or other treatment options as needed.          Plan:  OP PT Plan  Treatment/Interventions: Aquatic therapy, Cryotherapy, Dry needling, Education/ Instruction, Electrical stimulation, Hot pack, Manual therapy, Therapeutic exercises, Ultrasound, Mechanical traction (Manual Therapy including IASTM as needed)  PT Plan: Skilled PT  PT Frequency:  No Further Sessions.  DC.   Rehab Potential: Good  Plan of Care Agreement: Patient (Patient Goal:  Improve pain and be more active with less pain)    Current Problem  Problem List Items Addressed This Visit             ICD-10-CM       Musculoskeletal and Injuries    Neurogenic claudication due to lumbar spinal stenosis M48.062       Neuro    Displacement of lumbar intervertebral disc without myelopathy M51.26    Lumbar spondylosis M47.816       Subjective  Pt reports that she still has severe LBP and LE radicular pain.  Pt reports that she will continue with aquatic ex's and HEP as able to manage symptoms and plans on following up with MD for MRI and further course of  "rx.      Precautions  Precautions  Precautions Comment: Lower Fall.  PMH:  No significant PMH    Pain  Pain Assessment: 0-10  Pain Score: 5 - Moderate pain  Pain Type: Chronic pain  Pain Location: Back  Pain Orientation: Lower      Treatments:  Ther Ex:  25 min   2 units  Sci Fit Stepper  Lv 1  5 min  Slant Board Gastroc Stretch  2 x 1 min holds  Standing B/L HS Curls with TrA   x 15 reps ea   B/L Heel Raises  2 x 10 reps    5/29/2024 PT DC Summary Completed Today.    Following Ex's X Today:  B/L Mini Squats  2 x 10 reps    Standing 3-way hip with TrA (flex/ext/abd)  2 x 10 reps ea    TrA- Standing Resistive Rowing with TrA-  2 x 10 reps  Magenta Tubing   TrA- Standing Resistive Shoulder Extension with TrA-  2 x 10 reps  Pink Tubing    TrA- Pilat Press  Blue Band  x 15 reps ea    TrA- Resistive Lateral Trunk Rotation  x 15 reps ea  Blue Band    Hooklying TrA contract/relex  5 sec hold x 10 reps    Hooklying TrA- bridge  x 10 reps    HEP instruction with handout given.     Hooklying SKTC  5 sec hold x 5 reps ea  Hooklying Piriformis/Glut Stretch  20 sec hold x 2 ea  Hooklying Lateral Trunk Rotation  (small comfortable arc of motion)   2 min           Aquatics:   X Today  All exercises performed in 65-70% WBing unless noted  TrA contraction review 10 x 5” holds  Side Stepping 3 laps  Hip Extention x15  Hip Abduction x15  Alt HS Curl x15  Heel Raises x15  Squats x 15  Step Taps x 15  for extension   UE paddles Open x20  -flex/ext, push/pull, habd/hadd, abd/add  Dumbbell Rolls x 30\" ea. Dir   [place]  Paddle Board-push/pull, flex/ext  x 15 ea. Sm. Blue board  100% unloading 1 lrg noodle,  no support at pool ladder  -Skiing 5'  -hip abd 5'  -hang 5'  Gradual exit 3'       OP EDUCATION:   Edu on proper form and speed of movement with ex's.  Pt verbalized/demonstrated good understanding.      Goals:  Active       PT Problem       PT Goal 1       Start:  04/17/24    Expected End:  06/07/24       LTG's:    1) Improve Lumbar " AROM from [place] 25% Ext and b/l SB   to No Significant Limitation in all planes of deficit in order to facilitate improved gait and mobility.       4-6 weeks  2024, PARTIALLY MET, Lumbar ext and b/l SB [place] 15% in each plane    2) Improve Core and L hip/knee  strength from  4/5 to >= 5-/5 throughout in order to facilitate safe gait and mobility.         4-6 weeks  2024, PARTIALLY MET, L hip/knee strength 4 to 4+/5 at DC.     3) Improve Modified Oswestry score by >= 5 points in order to improve QOL.     4-6 weeks  2024, NOT MET, pt scored a 28% at baseline, a 36% at last reassessment and a 38% at DC.     4) Improve LBP from  9/10 to <= 3 /10 with activity in order to improve QOL.      4-6 weeks  2024, PARTIALLY MET, LBP pain range  2-9/10 with activity    5) Pt will be able to walk longer distances, lift/carry objects and perform standing activity in the home without significant limitation       4-6 weeks  2024, NOT MET, Pt reports that she has not seen significant change or improvement in the above activity and that she continues to have severe LBP that comes and goes.      ST)  Pt/caregiver will be I and consistent with HEP in order to maximize strength and flexibility.    2-3 weeks  2024, MET, pt is I with current land and aquatic HEP and has handouts as a reference.

## 2024-06-06 DIAGNOSIS — M48.062 NEUROGENIC CLAUDICATION DUE TO LUMBAR SPINAL STENOSIS: Primary | ICD-10-CM

## 2024-06-07 DIAGNOSIS — G47.00 INSOMNIA, UNSPECIFIED TYPE: ICD-10-CM

## 2024-06-07 RX ORDER — ZOLPIDEM TARTRATE 5 MG/1
5 TABLET ORAL NIGHTLY PRN
Qty: 30 TABLET | Refills: 0 | Status: SHIPPED | OUTPATIENT
Start: 2024-06-07 | End: 2024-07-07

## 2024-06-10 ENCOUNTER — APPOINTMENT (OUTPATIENT)
Dept: RADIOLOGY | Facility: HOSPITAL | Age: 64
End: 2024-06-10
Payer: COMMERCIAL

## 2024-06-11 ENCOUNTER — TELEPHONE (OUTPATIENT)
Dept: PAIN MEDICINE | Facility: CLINIC | Age: 64
End: 2024-06-11
Payer: COMMERCIAL

## 2024-06-11 DIAGNOSIS — M51.26 DISPLACEMENT OF LUMBAR INTERVERTEBRAL DISC WITHOUT MYELOPATHY: ICD-10-CM

## 2024-06-11 DIAGNOSIS — M47.816 LUMBAR SPONDYLOSIS: ICD-10-CM

## 2024-06-11 RX ORDER — PREGABALIN 25 MG/1
25 CAPSULE ORAL 2 TIMES DAILY
Qty: 60 CAPSULE | Refills: 1 | Status: SHIPPED | OUTPATIENT
Start: 2024-06-11

## 2024-06-11 NOTE — TELEPHONE ENCOUNTER
Caitlin called in  left a VM to ask about switching from Lyrica to Gabapentin.  I called the pt back no answer left her a VM to keep her 6/19/24  10:15 FUV and discuss with Chelsea jay. If she needs a RF on Lyrica before then I encouraged her to call the med RF line.

## 2024-06-19 ENCOUNTER — OFFICE VISIT (OUTPATIENT)
Dept: PAIN MEDICINE | Facility: CLINIC | Age: 64
End: 2024-06-19
Payer: COMMERCIAL

## 2024-06-19 VITALS
DIASTOLIC BLOOD PRESSURE: 78 MMHG | BODY MASS INDEX: 21.97 KG/M2 | WEIGHT: 128 LBS | SYSTOLIC BLOOD PRESSURE: 166 MMHG | HEART RATE: 69 BPM

## 2024-06-19 DIAGNOSIS — M54.16 LUMBAR RADICULOPATHY: Primary | ICD-10-CM

## 2024-06-19 DIAGNOSIS — M79.7 FIBROMYALGIA: ICD-10-CM

## 2024-06-19 DIAGNOSIS — M47.816 LUMBAR SPONDYLOSIS: ICD-10-CM

## 2024-06-19 DIAGNOSIS — M51.26 DISPLACEMENT OF LUMBAR INTERVERTEBRAL DISC WITHOUT MYELOPATHY: ICD-10-CM

## 2024-06-19 PROCEDURE — 99214 OFFICE O/P EST MOD 30 MIN: CPT | Performed by: STUDENT IN AN ORGANIZED HEALTH CARE EDUCATION/TRAINING PROGRAM

## 2024-06-19 RX ORDER — SODIUM CHLORIDE, SODIUM LACTATE, POTASSIUM CHLORIDE, CALCIUM CHLORIDE 600; 310; 30; 20 MG/100ML; MG/100ML; MG/100ML; MG/100ML
20 INJECTION, SOLUTION INTRAVENOUS CONTINUOUS
OUTPATIENT
Start: 2024-06-19

## 2024-06-19 RX ORDER — PREGABALIN 25 MG/1
75 CAPSULE ORAL 2 TIMES DAILY
Qty: 180 CAPSULE | Refills: 0 | Status: SHIPPED | OUTPATIENT
Start: 2024-06-19 | End: 2024-07-19

## 2024-06-19 ASSESSMENT — PATIENT HEALTH QUESTIONNAIRE - PHQ9
2. FEELING DOWN, DEPRESSED OR HOPELESS: NOT AT ALL
SUM OF ALL RESPONSES TO PHQ9 QUESTIONS 1 AND 2: 0
1. LITTLE INTEREST OR PLEASURE IN DOING THINGS: NOT AT ALL

## 2024-06-19 NOTE — H&P (VIEW-ONLY)
Subjective   Patient ID: Caitlin Hogan is a 63 y.o. female who presents for Back Pain (FOLLOW UP LEFT L3/4 AND L4/5 TFESI SHE STATES SHE GOT SOME RELIEF THE DAY OF THE PROCEDURE BUT THEN PAIN RETURNED, SHE STATES SHE HAS PAIN GOING DOWN THE LEFT LOWER BACK INTO THE BUTTOCK INTO LEFT LEG TO HER THIGH, TENDER TO TOUCH, FEELS LIKE SHE HAS A HEAVY BRICK ON HER THIGH, FIRST THING IN THE MORNING WHEN SHE WAKES AND STANDS ON HER FEET SHE HAS PAIN, AFFECTING HER ADL, SEX LIFE, WALKING, SITTING SHE REPOSITIONS OFTEN CANNOT SIT LONG IN HER RECLINER ANY LONGER, ). SHE DID AQUA THERAPY BUT HASN'T GOTTEN RELIEF,SHE STILL DOES HOME STRETCHING FROM THERAPY WHEN SHE IS NOT IN PAIN, SHE IN IN A WELLNESS PROGRAM WITH POOL ACTIVITY, HEAT ON HER LOWER BACK AND NECK FOR RELIEF, SHE IS TAKING BACLOFEN, LYRICA, SHE TAKES NORCO AND MELOXICAM FOR HER ONGOING NECK ISSUES, PAIN SCORE 4/10, NELLY=36%, SOAPP=1  HPI  Patient states about 20% relief transiently from this procedure and then pain is since returned.  Pain is significantly impacting her activities of daily Dragan did review her MRI and discussed that she has elements of foraminal stenosis but no significant areas of severe central canal stenosis and multilevel degenerative changes.  In addition we also discussed that she has areas of pain throughout her entire body that come and go at different times.  We discussed that her pain levels are somewhat consistent with her MRI findings but the dermatomal pattern does seem to match an L4 distribution on the left.  We discussed additional diagnoses as well as review of her lumbar MRI and medication changes that can be made.  She cites a significant number of psychosocial stressors and items throughout her life making tribute to other causes of pain such as fibromyalgia.  We also encouraged her to follow-up with her MRI that is scheduled as would like to see if anything is significantly changed from the last imaging study.  Review of Systems    All other systems reviewed and are negative.      Objective   Physical Exam  Constitutional: No acute distress, well appearing and well nourished. Patient appears stated age.  Eyes: Conjunctiva non-icteric and eye lids are without obvious rash or drooping. Pupils are symmetric.  Ears, Nose, Mouth, and Throat: External ears and nose appear to be without deformity or rash. No lesions or masses noted.  Hearing is grossly intact.  Neck: No JVD noted, tracheal position is midline.  Head and Face: Examination of the head and face revealed no abnormalities.  Respiratory: No gasping or shortness of breath noted, no use of accessory muscles noted.  Cardiovascular: Examination for edema is normal.   GI: Abdomen nontender to palpation.  Skin: No rashes or open lesions/ulcers identified on examined areas.    MSK:   No asymmetry or masses noted of the musculature.   Examination of the muscles/joints/bones show grossly normal range of motion unless noted below.    Neurologic:  Motor strength:   5/5 muscle strength of the upper extremities bilateral and equal.  5/5 muscle strength of the lower extremities bilaterally and equal.     Sensation:   Sensation intact to light touch in the bilateral upper and lower extremities.    Provocative tests: Multiple tender spots throughout entire body.  Negative Ivette sign bilaterally.  Seated straight leg raise test did reproduce mild radicular symptoms on the left only.    Psychiatric: Mood and affect are normal.      Assessment/Plan   Diagnoses and all orders for this visit:  Lumbar radiculopathy  Lumbar spondylosis  -     pregabalin (Lyrica) 25 mg capsule; Take 3 capsules (75 mg) by mouth 2 times a day.  Fibromyalgia  Displacement of lumbar intervertebral disc without myelopathy  -     pregabalin (Lyrica) 25 mg capsule; Take 3 capsules (75 mg) by mouth 2 times a day.       The patient´s history, physical exam and personal review of imaging indicate diagnoses of the above items.    Plan  description:  -We discussed that some of her symptoms are coming from lumbar radiculopathy, left-sided coming from an L4 dermatomal distribution.  Given that she has had minimal response with transforaminal epidural steroid injections we discussed performing an L4-5 interlaminar epidural steroid injection that would allow us to use a different steroid and may provide more lasting benefit  -We also discussed that she should follow-up with her lumbar MRI to better determine what is going on and if anything is drastically changed  -We discussed that a Vertiflex procedure at L4/5 could be considered as a neck step if she is hesitant to consider lumbar surgery  -Additionally we discussed that she likely has fibromyalgia that is compounding her pain and we should work on increasing her Lyrica as well to see if we can help with her neuropathic pain symptoms as well as fibromyalgia pain  -Will plan to follow-up 1 month postinjection or sooner if any issues arise    Counseling:  The patient was counseled regarding diagnostic results, instructions for management, risk factor reductions, prognosis, patient and family education, impressions, risk and benefits of treatment options, as well as adherence to current treatment regimen. The importance of physical therapy/core strengthening was discussed in regard to an appropriate level for the patient to participate in currently, as well as progress as able. Nicotine and alcohol use were reviewed and discussed as appropriate in relation to cessation and abstinence as indiciated, time spent for smoking cessation counseling when appropriate is 3-10 minutes. Appropriate use of opioid medications if prescribed as well as adherance and compliance to routine screening and avoidance of any medication that causes side effects in combination such as benzodiazepines. OARRS reviewed when indicated and naloxone offered if opioid medication prescribed.    All questions and concerns were  addressed during clinical visit. Patient verbalized understanding and agreement with the current plan and counselling. The patient was invited to contact us back anytime with any questions or concerns and follow-up with us in the future.      Josie Foy CMA 06/19/24 10:06 AM

## 2024-06-20 ENCOUNTER — APPOINTMENT (OUTPATIENT)
Dept: RADIOLOGY | Facility: HOSPITAL | Age: 64
End: 2024-06-20
Payer: COMMERCIAL

## 2024-06-20 ENCOUNTER — HOSPITAL ENCOUNTER (OUTPATIENT)
Dept: RADIOLOGY | Facility: HOSPITAL | Age: 64
Discharge: HOME | End: 2024-06-20
Payer: COMMERCIAL

## 2024-06-20 DIAGNOSIS — M48.062 NEUROGENIC CLAUDICATION DUE TO LUMBAR SPINAL STENOSIS: ICD-10-CM

## 2024-06-20 DIAGNOSIS — M54.2 CERVICALGIA: ICD-10-CM

## 2024-06-20 PROCEDURE — 72148 MRI LUMBAR SPINE W/O DYE: CPT | Performed by: RADIOLOGY

## 2024-06-20 PROCEDURE — 72148 MRI LUMBAR SPINE W/O DYE: CPT

## 2024-06-20 RX ORDER — HYDROCODONE BITARTRATE AND ACETAMINOPHEN 5; 325 MG/1; MG/1
1 TABLET ORAL 3 TIMES DAILY
Qty: 90 TABLET | Refills: 0 | Status: SHIPPED | OUTPATIENT
Start: 2024-06-20

## 2024-06-21 ENCOUNTER — TELEPHONE (OUTPATIENT)
Dept: PAIN MEDICINE | Facility: CLINIC | Age: 64
End: 2024-06-21
Payer: COMMERCIAL

## 2024-06-25 ENCOUNTER — APPOINTMENT (OUTPATIENT)
Dept: PRIMARY CARE | Facility: CLINIC | Age: 64
End: 2024-06-25
Payer: COMMERCIAL

## 2024-06-25 VITALS
OXYGEN SATURATION: 98 % | SYSTOLIC BLOOD PRESSURE: 158 MMHG | WEIGHT: 128.8 LBS | HEIGHT: 64 IN | DIASTOLIC BLOOD PRESSURE: 76 MMHG | HEART RATE: 79 BPM | BODY MASS INDEX: 21.99 KG/M2

## 2024-06-25 DIAGNOSIS — E03.9 ACQUIRED HYPOTHYROIDISM: ICD-10-CM

## 2024-06-25 DIAGNOSIS — M79.7 FIBROMYALGIA: ICD-10-CM

## 2024-06-25 DIAGNOSIS — F32.1 DEPRESSION, MAJOR, SINGLE EPISODE, MODERATE (MULTI): ICD-10-CM

## 2024-06-25 DIAGNOSIS — M81.6 LOCALIZED OSTEOPOROSIS WITHOUT CURRENT PATHOLOGICAL FRACTURE: ICD-10-CM

## 2024-06-25 DIAGNOSIS — K90.9 STEATORRHEA (HHS-HCC): ICD-10-CM

## 2024-06-25 DIAGNOSIS — F51.04 PSYCHOPHYSIOLOGICAL INSOMNIA: ICD-10-CM

## 2024-06-25 DIAGNOSIS — K21.9 GASTROESOPHAGEAL REFLUX DISEASE WITHOUT ESOPHAGITIS: ICD-10-CM

## 2024-06-25 DIAGNOSIS — C44.311 BASAL CELL CARCINOMA (BCC) OF SKIN OF NOSE: ICD-10-CM

## 2024-06-25 DIAGNOSIS — I10 BENIGN HYPERTENSION: ICD-10-CM

## 2024-06-25 DIAGNOSIS — M51.26 DISPLACEMENT OF LUMBAR INTERVERTEBRAL DISC WITHOUT MYELOPATHY: ICD-10-CM

## 2024-06-25 DIAGNOSIS — J30.9 CHRONIC ALLERGIC RHINITIS: ICD-10-CM

## 2024-06-25 DIAGNOSIS — F41.9 ANXIETY: Primary | ICD-10-CM

## 2024-06-25 DIAGNOSIS — M15.9 GENERALIZED OSTEOARTHRITIS OF MULTIPLE SITES: ICD-10-CM

## 2024-06-25 DIAGNOSIS — M47.812 CERVICAL SPONDYLOSIS WITHOUT MYELOPATHY: ICD-10-CM

## 2024-06-25 DIAGNOSIS — K58.0 IRRITABLE BOWEL SYNDROME WITH DIARRHEA: ICD-10-CM

## 2024-06-25 DIAGNOSIS — M48.062 NEUROGENIC CLAUDICATION DUE TO LUMBAR SPINAL STENOSIS: ICD-10-CM

## 2024-06-25 PROCEDURE — 3077F SYST BP >= 140 MM HG: CPT | Performed by: FAMILY MEDICINE

## 2024-06-25 PROCEDURE — 3078F DIAST BP <80 MM HG: CPT | Performed by: FAMILY MEDICINE

## 2024-06-25 PROCEDURE — 99214 OFFICE O/P EST MOD 30 MIN: CPT | Performed by: FAMILY MEDICINE

## 2024-06-25 PROCEDURE — 1036F TOBACCO NON-USER: CPT | Performed by: FAMILY MEDICINE

## 2024-06-25 ASSESSMENT — PATIENT HEALTH QUESTIONNAIRE - PHQ9
1. LITTLE INTEREST OR PLEASURE IN DOING THINGS: NOT AT ALL
2. FEELING DOWN, DEPRESSED OR HOPELESS: NOT AT ALL
SUM OF ALL RESPONSES TO PHQ9 QUESTIONS 1 AND 2: 0

## 2024-06-25 NOTE — PROGRESS NOTES
Subjective   Patient ID: Caitlin Hogan is a 63 y.o. female who presents for Follow-up (3 month follow up ).    HPI   IBS/ Steatorrhea - Celiac panel negative. Bowels are still messed up in that she has loose stools in the AM up to 5 per day. Cramps Coffee will cause this. CT was normal except for the diverticulosis. Scope in 2017. No blood. Cologuard was negative. Getting fiber. Bloated and cramping lower abdomen. Aggravated by Zithromax for the URI. She saw Dr Gonsales and has started Papaya. Did not help. But doing better now. Rifaximin for a couple weeks did help but still comes and goes. She was on Protonix per Dr Lorenz. Helped the stomach feel some better. Did have lots  of stress with brother dying and being executor. That has been resolved.  A sister  and another brother has cancer. Caring for her father who was in the hospital for 6 days.  She had normal colonoscopy in 2023 Ice cream clearly aggravates. Has not tried Gluten limiting but has tried limiting milk.  Can try low FODMPS      Anxiety and insomnia has been some better since she retired and can feel her body changing inside. Still stress with  LBP below and kids overseas with . Still daily zolpidem.  Helps along with Melatonin. Helps to shut down. Still wakes in the night. Several family members with cancer in the last 5 years of bladder, colon and ovary so has concerns for herself. All were around 60 years old. Several with colon cancer. She is tired a lot for months. Mentally exhausted. Was very loud at work and does not feel she is getting support with difficult kids. She retired in 2023.   Was on Wellbutrin but did not help. Feels she has been losing weight. Down 9 pound over a year but up a bit more this time.   Feels stress this contributes to the neck pain. She does snore.  Has been to ENT. Checked for sleep apnea in past. Does not want testing at present.     Basal cell carcinoma of nose - no skin lesions.  Dr Harris booth a couple. Annual visits. Had a lesion biopsied and benign on face a few weeks ago. Chemo cream now for spots on chest and face.     Cervicalgia - she was back  at work and worse. She is off work now and feels she would not be able to work.  Also with traveling. Had injections multiple times. She was miserable a year ago.   MICHELE helped for 3 weeks.   Painful at end of day and headaches. Has had therapy and injections. Bike riding aggravates. Pain to shoulder on left. MRI in 2017 showed moderate C5 foramen stenosis. Otherwise mild steniosis at multiple levels.  Has been to Dr White for injections several years ago. And then to Dr Doe and had injection in 2019. She is to have a series of injections based on MRI in 2021 showing again multilevel cervical spondylosis and stenosis with Dr Cano   A recent MRI on 2/14/23 did not show anything more than moderate stenosis at C5.  Did one injection and ablation. They have started to discuss  surgery. Has had therapy several times through the years with not relief.  Pain level 2-10 and drops 2 for 4-5hours. Dr Rodríguez did increase Lyrica as he feels she has fibromyalgia.  Allows her to walk better and do house and lawn and garden.  With the LBP below she is not able to function with some days even with meds. Would like to ride a motorcycle, gardening, ride longer distances, kayak and gardening. Has lost muscle mass and concerned.      Chronic rhinitis - running nose when outside is cold or hot and with pollen in summer. Allegra helps when taken as needed.  Has been to ENT      Depressive disorder - worries as above. No meds. But will treat the anxiety. Sertraline 25 does not feel like it is doing anything so went off of it. She is getting forgetful. Not suicidal. Feels she need to see a counselor and will consider when less active. Feels she is down as she is not doing things with being retired. Just discouraged with the pain     Family history of multiple  cancers in sibling. So she had genetics eval with Dr Munoz and no inherited disorder.      Generalized osteoarthrosis, involving multiple sites -Knee feels like the patella scrapes at times. Pain in the right hip over greater trochanter. Has had the bursa removed. Did help but now pain again and not sure if the bursa recurrence or from the back. All positional and activity related. Not every day. Was with Dr Tenorio. She did retire in 2023      Hypothyroidism NOS - Good TSH in September on current dose      Insomnia - gets to sleep and stays asleep for 6 hours Ambien and back on Melatonin. Wakes with less anxiety      Low back pain worse now that she is active around the house.  Worse after mowing.  Pain down the left leg. She says she would like to walk 2 miles. Has been about a year. Constant pain.  Did see Chelsea in Dr Cano's office. MRI showed herniated discs. Referred to Dr Anju Friedman who did an injection with no relief. Now to have MRI June 20.  Saw Dr Rodríguez who did send to therapy in order to get that MRI.  Had to do aquatics and [facility]  Also did an injection that did not go well.  Will look at different options after MRI per Dr Friedman.   Norco  tid for pain helps some - see above.  No numbness or weakness. Mid back hurts after standing.      Lump or mass in breast - good Mammogram in January. Also Pap last fall. Had dysuria that went away as did the blood.      Menopause - no hot flashes.      MVP (mitral valve prolapse) - Fatigue in the chest at times better with BP control. Not pain. Short of breath at times. Flutters at times with anxiety. . No edema. All better since retired      Osteopenia - on DEXA 9/29/22. She is on Vitamin D and Calcium. Not able to get Prolia. FRAX is 15/6%. Ibandronate now.      Pancreatitis - nothing recent. But says she has  been belching a lot lately and has a pain in left chest when she tips to the side. No heartburn.  No dysphagia..  No blood in  "stools.    Raynauds with cold pain      Renal Cyst noted on CT this year. CMP WNL last year      Spider veins of limb - not sensitive      Tear of medial meniscus of right knee -Dr Tenorio. This is doing better      Former smoker. 16 years ago.,  She smoked 1- 1.5 PPD for 30+.       Left foot pain top with walking. No deformity      I have personally reviewed the patients OARRS report. . This report is filed in the EHR. I have considered the risks of abuse, addiction and diversion. I believe it is clinically appropriate to continue to prescribe this medication.  Discussed frequent one day early fills.      UDS 10/20/20 No Norco as cutting back 12/9/21 as expected for medication profile1/13/23 as expected for medication profile 3/25/24 as expected for medication profile   CSA 12/4/23 for Ambien;  CSA 3/25/24  for Norco   ORT 4/27/20 score 2  Mammogram 9/27/22  scope 2/22/23     Review of Systems    Objective   /76 (BP Location: Right arm, Patient Position: Sitting)   Pulse 79   Ht 1.626 m (5' 4\")   Wt 58.4 kg (128 lb 12.8 oz)   SpO2 98%   BMI 22.11 kg/m²     Physical Exam  Vitals reviewed.   Constitutional:       General: She is not in acute distress.     Appearance: Normal appearance.   HENT:      Head: Normocephalic.      Right Ear: Tympanic membrane, ear canal and external ear normal.      Left Ear: Tympanic membrane, ear canal and external ear normal.      Nose: Nose normal.      Mouth/Throat:      Mouth: Mucous membranes are moist.      Pharynx: Oropharynx is clear.   Eyes:      Extraocular Movements: Extraocular movements intact.      Conjunctiva/sclera: Conjunctivae normal.      Pupils: Pupils are equal, round, and reactive to light.   Neck:      Vascular: No carotid bruit.   Cardiovascular:      Rate and Rhythm: Normal rate and regular rhythm.      Pulses: Normal pulses.      Heart sounds: Normal heart sounds. No murmur heard.  Pulmonary:      Effort: Pulmonary effort is normal. No respiratory " distress.      Breath sounds: Normal breath sounds.   Abdominal:      General: Abdomen is flat. Bowel sounds are normal. There is no distension.      Palpations: Abdomen is soft. There is no mass.      Tenderness: There is no abdominal tenderness.   Musculoskeletal:         General: Tenderness (paraspinal diffusely and triggers.) and deformity (mild Heberden's nodes) present.      Cervical back: Normal range of motion and neck supple. No tenderness.   Lymphadenopathy:      Cervical: No cervical adenopathy.   Skin:     General: Skin is warm and dry.      Findings: No rash.   Neurological:      General: No focal deficit present.      Mental Status: She is alert and oriented to person, place, and time.   Psychiatric:         Mood and Affect: Mood normal.         Thought Content: Thought content normal.         Judgment: Judgment normal.         Assessment/Plan   Diagnoses and all orders for this visit:  Anxiety  Basal cell carcinoma (BCC) of skin of nose  Benign hypertension  -     Comprehensive Metabolic Panel; Future  -     CBC; Future  -     Lipid Panel; Future  Cervical spondylosis without myelopathy  Chronic allergic rhinitis  Depression, major, single episode, moderate (Multi)  Displacement of lumbar intervertebral disc without myelopathy  Fibromyalgia  Generalized osteoarthritis of multiple sites  Gastroesophageal reflux disease without esophagitis  Acquired hypothyroidism  -     TSH with reflex to Free T4 if abnormal; Future  Psychophysiological insomnia  Irritable bowel syndrome with diarrhea  Neurogenic claudication due to lumbar spinal stenosis  Localized osteoporosis without current pathological fracture  Steatorrhea ([facility]HS-HCC)  Other orders  -     Follow Up In Primary Care - Established

## 2024-06-27 ENCOUNTER — DOCUMENTATION (OUTPATIENT)
Dept: PAIN MEDICINE | Facility: CLINIC | Age: 64
End: 2024-06-27
Payer: COMMERCIAL

## 2024-06-28 ENCOUNTER — TELEPHONE (OUTPATIENT)
Dept: NEUROSURGERY | Facility: CLINIC | Age: 64
End: 2024-06-28
Payer: COMMERCIAL

## 2024-07-08 DIAGNOSIS — G47.00 INSOMNIA, UNSPECIFIED TYPE: ICD-10-CM

## 2024-07-08 DIAGNOSIS — E03.9 HYPOTHYROIDISM, UNSPECIFIED TYPE: ICD-10-CM

## 2024-07-08 DIAGNOSIS — I10 BENIGN HYPERTENSION: ICD-10-CM

## 2024-07-08 RX ORDER — LEVOTHYROXINE SODIUM 100 UG/1
100 TABLET ORAL DAILY
Qty: 90 TABLET | Refills: 1 | Status: SHIPPED | OUTPATIENT
Start: 2024-07-08

## 2024-07-08 RX ORDER — LISINOPRIL 5 MG/1
5 TABLET ORAL DAILY
Qty: 90 TABLET | Refills: 1 | Status: SHIPPED | OUTPATIENT
Start: 2024-07-08

## 2024-07-08 RX ORDER — ZOLPIDEM TARTRATE 5 MG/1
5 TABLET ORAL NIGHTLY PRN
Qty: 30 TABLET | Refills: 0 | Status: SHIPPED | OUTPATIENT
Start: 2024-07-08 | End: 2024-08-07

## 2024-07-11 ENCOUNTER — TELEPHONE (OUTPATIENT)
Dept: PAIN MEDICINE | Facility: CLINIC | Age: 64
End: 2024-07-11
Payer: COMMERCIAL

## 2024-07-11 DIAGNOSIS — M54.12 CERVICAL RADICULITIS: ICD-10-CM

## 2024-07-11 RX ORDER — BACLOFEN 10 MG/1
10 TABLET ORAL 3 TIMES DAILY
Qty: 90 TABLET | Refills: 0 | Status: SHIPPED | OUTPATIENT
Start: 2024-07-11

## 2024-07-18 DIAGNOSIS — M54.2 CERVICALGIA: ICD-10-CM

## 2024-07-18 RX ORDER — HYDROCODONE BITARTRATE AND ACETAMINOPHEN 5; 325 MG/1; MG/1
1 TABLET ORAL 3 TIMES DAILY
Qty: 90 TABLET | Refills: 0 | Status: SHIPPED | OUTPATIENT
Start: 2024-07-18

## 2024-07-19 ENCOUNTER — HOSPITAL ENCOUNTER (OUTPATIENT)
Dept: OPERATING ROOM | Facility: HOSPITAL | Age: 64
Setting detail: OUTPATIENT SURGERY
Discharge: HOME | End: 2024-07-19
Payer: COMMERCIAL

## 2024-07-19 VITALS
WEIGHT: 128 LBS | BODY MASS INDEX: 21.85 KG/M2 | HEIGHT: 64 IN | TEMPERATURE: 97.4 F | DIASTOLIC BLOOD PRESSURE: 78 MMHG | RESPIRATION RATE: 16 BRPM | HEART RATE: 57 BPM | SYSTOLIC BLOOD PRESSURE: 134 MMHG | OXYGEN SATURATION: 96 %

## 2024-07-19 DIAGNOSIS — M54.16 LUMBAR RADICULOPATHY: ICD-10-CM

## 2024-07-19 DIAGNOSIS — M47.816 LUMBAR SPONDYLOSIS: ICD-10-CM

## 2024-07-19 PROCEDURE — 2500000004 HC RX 250 GENERAL PHARMACY W/ HCPCS (ALT 636 FOR OP/ED): Performed by: STUDENT IN AN ORGANIZED HEALTH CARE EDUCATION/TRAINING PROGRAM

## 2024-07-19 PROCEDURE — 62323 NJX INTERLAMINAR LMBR/SAC: CPT | Performed by: STUDENT IN AN ORGANIZED HEALTH CARE EDUCATION/TRAINING PROGRAM

## 2024-07-19 PROCEDURE — 2550000001 HC RX 255 CONTRASTS: Performed by: STUDENT IN AN ORGANIZED HEALTH CARE EDUCATION/TRAINING PROGRAM

## 2024-07-19 PROCEDURE — 2500000005 HC RX 250 GENERAL PHARMACY W/O HCPCS: Performed by: STUDENT IN AN ORGANIZED HEALTH CARE EDUCATION/TRAINING PROGRAM

## 2024-07-19 RX ORDER — METHYLPREDNISOLONE ACETATE 40 MG/ML
40 INJECTION, SUSPENSION INTRA-ARTICULAR; INTRALESIONAL; INTRAMUSCULAR; SOFT TISSUE ONCE
Status: DISCONTINUED | OUTPATIENT
Start: 2024-07-19 | End: 2024-07-20 | Stop reason: HOSPADM

## 2024-07-19 RX ORDER — SODIUM CHLORIDE 9 MG/ML
INJECTION, SOLUTION INTRAMUSCULAR; INTRAVENOUS; SUBCUTANEOUS AS NEEDED
Status: COMPLETED | OUTPATIENT
Start: 2024-07-19 | End: 2024-07-19

## 2024-07-19 RX ORDER — SODIUM CHLORIDE 9 MG/ML
4 INJECTION, SOLUTION INTRAMUSCULAR; INTRAVENOUS; SUBCUTANEOUS ONCE
Status: DISCONTINUED | OUTPATIENT
Start: 2024-07-19 | End: 2024-07-20 | Stop reason: HOSPADM

## 2024-07-19 RX ORDER — METHYLPREDNISOLONE ACETATE 40 MG/ML
INJECTION, SUSPENSION INTRA-ARTICULAR; INTRALESIONAL; INTRAMUSCULAR; SOFT TISSUE AS NEEDED
Status: COMPLETED | OUTPATIENT
Start: 2024-07-19 | End: 2024-07-19

## 2024-07-19 RX ORDER — LIDOCAINE HYDROCHLORIDE 20 MG/ML
6 INJECTION, SOLUTION EPIDURAL; INFILTRATION; INTRACAUDAL; PERINEURAL ONCE
Status: COMPLETED | OUTPATIENT
Start: 2024-07-19 | End: 2024-07-19

## 2024-07-19 ASSESSMENT — PAIN - FUNCTIONAL ASSESSMENT
PAIN_FUNCTIONAL_ASSESSMENT: 0-10
PAIN_FUNCTIONAL_ASSESSMENT: 0-10

## 2024-07-19 ASSESSMENT — ENCOUNTER SYMPTOMS
DEPRESSION: 0
OCCASIONAL FEELINGS OF UNSTEADINESS: 0
LOSS OF SENSATION IN FEET: 0

## 2024-07-19 ASSESSMENT — PAIN SCALES - GENERAL
PAINLEVEL_OUTOF10: 4
PAINLEVEL_OUTOF10: 4

## 2024-07-19 ASSESSMENT — COLUMBIA-SUICIDE SEVERITY RATING SCALE - C-SSRS
1. IN THE PAST MONTH, HAVE YOU WISHED YOU WERE DEAD OR WISHED YOU COULD GO TO SLEEP AND NOT WAKE UP?: NO
6. HAVE YOU EVER DONE ANYTHING, STARTED TO DO ANYTHING, OR PREPARED TO DO ANYTHING TO END YOUR LIFE?: NO
2. HAVE YOU ACTUALLY HAD ANY THOUGHTS OF KILLING YOURSELF?: NO

## 2024-07-19 ASSESSMENT — PATIENT HEALTH QUESTIONNAIRE - PHQ9
SUM OF ALL RESPONSES TO PHQ9 QUESTIONS 1 AND 2: 0
1. LITTLE INTEREST OR PLEASURE IN DOING THINGS: NOT AT ALL
2. FEELING DOWN, DEPRESSED OR HOPELESS: NOT AT ALL

## 2024-07-19 NOTE — Clinical Note
Patient transferred self to OR bed. Pillow placed under abdomen. Patient prepped with chloraprep per MD. Patient transported to post recovery care and report given to post recovery rn. Side rails up and bed wheels locked.

## 2024-07-19 NOTE — PERIOPERATIVE NURSING NOTE
Discharge instructions reviewed by  Ava KATHLEEN RN  no questions and verbalized understanding.   discharged amb to exit steady gait,  to drive herself home issac well

## 2024-07-29 ENCOUNTER — TELEPHONE (OUTPATIENT)
Dept: PAIN MEDICINE | Facility: CLINIC | Age: 64
End: 2024-07-29
Payer: COMMERCIAL

## 2024-07-29 DIAGNOSIS — M51.26 DISPLACEMENT OF LUMBAR INTERVERTEBRAL DISC WITHOUT MYELOPATHY: ICD-10-CM

## 2024-07-29 DIAGNOSIS — M47.816 LUMBAR SPONDYLOSIS: ICD-10-CM

## 2024-07-30 RX ORDER — PREGABALIN 25 MG/1
75 CAPSULE ORAL 2 TIMES DAILY
Qty: 180 CAPSULE | Refills: 0 | Status: SHIPPED | OUTPATIENT
Start: 2024-07-30 | End: 2024-08-29

## 2024-08-05 DIAGNOSIS — G47.00 INSOMNIA, UNSPECIFIED TYPE: ICD-10-CM

## 2024-08-05 RX ORDER — ZOLPIDEM TARTRATE 5 MG/1
5 TABLET ORAL NIGHTLY PRN
Qty: 30 TABLET | Refills: 0 | Status: SHIPPED | OUTPATIENT
Start: 2024-08-05 | End: 2024-09-04

## 2024-08-07 DIAGNOSIS — M25.551 HIP PAIN, RIGHT: ICD-10-CM

## 2024-08-07 RX ORDER — MELOXICAM 15 MG/1
15 TABLET ORAL DAILY
Qty: 90 TABLET | Refills: 0 | Status: SHIPPED | OUTPATIENT
Start: 2024-08-07

## 2024-08-15 ENCOUNTER — OFFICE VISIT (OUTPATIENT)
Dept: PAIN MEDICINE | Facility: CLINIC | Age: 64
End: 2024-08-15
Payer: COMMERCIAL

## 2024-08-15 ENCOUNTER — TELEPHONE (OUTPATIENT)
Dept: NEUROSURGERY | Facility: CLINIC | Age: 64
End: 2024-08-15

## 2024-08-15 VITALS — DIASTOLIC BLOOD PRESSURE: 66 MMHG | SYSTOLIC BLOOD PRESSURE: 140 MMHG | HEART RATE: 58 BPM | RESPIRATION RATE: 20 BRPM

## 2024-08-15 DIAGNOSIS — M47.816 LUMBAR SPONDYLOSIS: ICD-10-CM

## 2024-08-15 DIAGNOSIS — M54.16 LUMBAR RADICULOPATHY: ICD-10-CM

## 2024-08-15 DIAGNOSIS — M48.062 NEUROGENIC CLAUDICATION DUE TO LUMBAR SPINAL STENOSIS: Primary | ICD-10-CM

## 2024-08-15 PROCEDURE — 99214 OFFICE O/P EST MOD 30 MIN: CPT | Performed by: PHYSICIAN ASSISTANT

## 2024-08-15 ASSESSMENT — ENCOUNTER SYMPTOMS
PSYCHIATRIC NEGATIVE: 1
CONSTITUTIONAL NEGATIVE: 1
WEAKNESS: 1
ARTHRALGIAS: 1
ENDOCRINE NEGATIVE: 1
EYES NEGATIVE: 1
ALLERGIC/IMMUNOLOGIC NEGATIVE: 1
HEMATOLOGIC/LYMPHATIC NEGATIVE: 1
GASTROINTESTINAL NEGATIVE: 1
RESPIRATORY NEGATIVE: 1
MYALGIAS: 1
CARDIOVASCULAR NEGATIVE: 1
BACK PAIN: 1
NUMBNESS: 1

## 2024-08-15 ASSESSMENT — PAIN SCALES - GENERAL: PAINLEVEL: 4

## 2024-08-15 NOTE — PROGRESS NOTES
Subjective   Patient ID: Caitlin Hogan is a 63 y.o. female who presents for Back Pain (Patient is following up today from L4/5 MICHELE that she had on 07/19/24.  Patient states it didn't help at all for a week and then she got 4 days of relief and then the pain returned.  Patient complains of left leg pain from buttocks to knee in her outer thigh.  She states that the leg pain is constant.  She also complains of lower back pain with increased activity.  She rates her pain a 4/10 at this time.  And a 8/10 by the end of the day.).    NELLY score 32.      Kavitha Cifuentes RN 08/15/24 10:33 AM     Patient is a 63-year-old female.  She presents today for follow-up after undergoing L4-5 epidural steroid injection.  This done on 7/19/2024 and it gave her significant but short-lived relief.  Same as previous transforaminal epidural steroid injection.  She has lower back pain with left buttock pain and left radiating leg pain that goes from her buttock to her knee.  It is in her lateral thigh.  It is worse with activities.  She rates it a 4-8/10.  It is very bothersome to her.  She has undergone all reasonable conservative treatment.  She had an MRI ordered by her spine surgeon in June and never followed up after that.  I would recommend her to do this.  At her last appointment the Vertiflex was discussed with her.  She would like to know more information about this.  She states that she would consider doing it.  She is using Lyrica.  Tolerating well.  Okay unfortunately does not give her enough relief and she really wants to try to come off her medications.        Review of Systems   Constitutional: Negative.    HENT: Negative.     Eyes: Negative.    Respiratory: Negative.     Cardiovascular: Negative.    Gastrointestinal: Negative.    Endocrine: Negative.    Genitourinary: Negative.    Musculoskeletal:  Positive for arthralgias, back pain, gait problem and myalgias.   Skin: Negative.    Allergic/Immunologic: Negative.     Neurological:  Positive for weakness and numbness.   Hematological: Negative.    Psychiatric/Behavioral: Negative.         Objective   Physical Exam  Vitals and nursing note reviewed.   Constitutional:       General: She is not in acute distress.     Appearance: Normal appearance. She is not ill-appearing.   HENT:      Head: Normocephalic and atraumatic.      Right Ear: External ear normal.      Left Ear: External ear normal.      Nose: Nose normal.      Mouth/Throat:      Pharynx: Oropharynx is clear.   Eyes:      Conjunctiva/sclera: Conjunctivae normal.   Cardiovascular:      Rate and Rhythm: Normal rate and regular rhythm.      Pulses: Normal pulses.   Pulmonary:      Effort: Pulmonary effort is normal.      Breath sounds: Normal breath sounds.   Musculoskeletal:         General: Normal range of motion.      Cervical back: Normal range of motion.      Comments: 5/5 upper and lower extremity strength other than left hip flexion 4+/5   Skin:     General: Skin is warm and dry.   Neurological:      General: No focal deficit present.      Mental Status: She is alert and oriented to person, place, and time. Mental status is at baseline.   Psychiatric:         Mood and Affect: Mood normal.         Behavior: Behavior normal.         Thought Content: Thought content normal.         Judgment: Judgment normal.         MR lumbar spine wo IV contrast  Status: Final result     PACS Images     Show images for MR lumbar spine wo IV contrast  Signed by    Signed Time Phone Pager   Jd Atwood MD 6/20/2024 14:27 [phone]     Exam Information    Status Exam Begun Exam Ended   Final 6/20/2024 11:41 6/20/2024 12:38     Study Result    Narrative & Impression   Interpreted By:  Jd Atwood,   STUDY:  MRI of the lumbar spine without IV contrast;  6/20/2024 12:38 pm      INDICATION:  Signs/Symptoms:Left leg pain, back pain.      COMPARISON:  05/05/2023 lumbar MR      ACCESSION NUMBER(S):  [id number]      ORDERING  CLINICIAN:  MELANIE WEI      TECHNIQUE:  Sagittal and axial STIR and T1-weighted MRI images of the lumbar  spine were acquired using a spondylolysis protocol.  No contrast was  administered.      FINDINGS:  There are 5 lumbar type vertebrae. The spine has mild kyphosis  centered at L1-2 more so than noted previously secondary to near  complete loss of disc height anteriorly and mild loss of height along  the adjacent vertebral body corners. Slight retrolisthesis is also  noted at L2-3 and L3-4.      The vertebral body marrow adjacent to the anterior aspect of the L1-2  disc has patchy areas of degenerative edema and fatty transformation.  Thin bands of degenerative edema are also noted adjacent to the left  side of the L4-5 disc.      The tip of the spinal cord ends normally at L1.      T11-12: No stenosis is noted on the sagittal images.      T12-L1: No stenosis is noted.      L1-2: The right lateral recess is mildly stenosed by disc bulge,  unchanged.      L2-3: The spinal canal and lateral recesses are mildly stenosed by  disc bulge, unchanged.      L3-4: The lateral recesses are mildly to moderately stenosed by disc  bulge and facet hypertrophy with mild central stenosis, unchanged.      L4-5: The left lateral recess is moderately stenosed with mild  stenosis of the right lateral recess secondary to disc bulge and  ligament thickening with facet hypertrophy. The left neural foramen  is moderately stenosed with mild stenosis on the right. Findings are  similar to the prior MR. The facet joints are moderately arthritic  bilaterally.      L5-S1: No stenosis is noted.      The left kidney has a less than 1 cm cyst, unchanged.      IMPRESSION:  1. Degenerative changes at the L1-2 disc level anteriorly have  worsened since the prior exam with loss of disc height and slight  loss of height of the adjacent vertebral body corners.      2. Additional degenerative changes elsewhere similar to the prior MR.      I  personally reviewed the images/study and I agree with the findings  as stated. This study was interpreted at [facility], [place], [place].      MACRO:  None      Signed by: Jd Atwood 6/20/2024 2:27 PM  Dictation workstation:   [id number]     Assessment/Plan   Diagnoses and all orders for this visit:  Neurogenic claudication due to lumbar spinal stenosis  Lumbar radiculopathy  Lumbar spondylosis       Patient is a 63-year-old female with the above-mentioned medical diagnoses following up today after undergoing an L4-5 epidural steroid injection that gave significant but short-lived relief.  Same as previous transforaminal.  We once again discussed options.  We reviewed her imaging.  At this time, she has not seen Dr. Friedman after having the updated lumbar MRI he ordered.  I would recommend her to do this to discuss her options.  We also discussed an L4-5 Vertiflex.  She was given information about this.  She is going to look into this and she will call us if she decides she wants to pursue this.  I would like to review her case with Dr. Rodríguez but she will call us after her consultation with Dr. Friedman to discuss what she decided to do.

## 2024-08-19 DIAGNOSIS — M54.2 CERVICALGIA: ICD-10-CM

## 2024-08-19 RX ORDER — HYDROCODONE BITARTRATE AND ACETAMINOPHEN 5; 325 MG/1; MG/1
1 TABLET ORAL 3 TIMES DAILY
Qty: 90 TABLET | Refills: 0 | Status: SHIPPED | OUTPATIENT
Start: 2024-08-19

## 2024-08-27 ENCOUNTER — TELEPHONE (OUTPATIENT)
Dept: PAIN MEDICINE | Facility: CLINIC | Age: 64
End: 2024-08-27
Payer: COMMERCIAL

## 2024-08-27 DIAGNOSIS — M47.816 LUMBAR SPONDYLOSIS: ICD-10-CM

## 2024-08-27 DIAGNOSIS — G47.00 INSOMNIA, UNSPECIFIED TYPE: ICD-10-CM

## 2024-08-27 DIAGNOSIS — M54.12 CERVICAL RADICULITIS: ICD-10-CM

## 2024-08-27 DIAGNOSIS — M51.26 DISPLACEMENT OF LUMBAR INTERVERTEBRAL DISC WITHOUT MYELOPATHY: ICD-10-CM

## 2024-08-27 RX ORDER — ZOLPIDEM TARTRATE 5 MG/1
5 TABLET ORAL NIGHTLY PRN
Qty: 30 TABLET | Refills: 0 | Status: SHIPPED | OUTPATIENT
Start: 2024-09-01 | End: 2024-10-01

## 2024-08-27 RX ORDER — BACLOFEN 10 MG/1
10 TABLET ORAL 3 TIMES DAILY
Qty: 90 TABLET | Refills: 0 | Status: SHIPPED | OUTPATIENT
Start: 2024-08-27

## 2024-08-29 RX ORDER — PREGABALIN 25 MG/1
75 CAPSULE ORAL 2 TIMES DAILY
Qty: 180 CAPSULE | Refills: 0 | Status: SHIPPED | OUTPATIENT
Start: 2024-08-29 | End: 2024-09-28

## 2024-09-18 DIAGNOSIS — M54.2 CERVICALGIA: ICD-10-CM

## 2024-09-18 RX ORDER — HYDROCODONE BITARTRATE AND ACETAMINOPHEN 5; 325 MG/1; MG/1
1 TABLET ORAL 3 TIMES DAILY
Qty: 90 TABLET | Refills: 0 | Status: SHIPPED | OUTPATIENT
Start: 2024-09-18

## 2024-09-23 ENCOUNTER — OFFICE VISIT (OUTPATIENT)
Facility: CLINIC | Age: 64
End: 2024-09-23
Payer: COMMERCIAL

## 2024-09-23 VITALS
DIASTOLIC BLOOD PRESSURE: 89 MMHG | HEIGHT: 63 IN | HEART RATE: 57 BPM | SYSTOLIC BLOOD PRESSURE: 139 MMHG | BODY MASS INDEX: 22.68 KG/M2 | TEMPERATURE: 97.6 F | WEIGHT: 128 LBS

## 2024-09-23 DIAGNOSIS — M54.16 LUMBAR RADICULOPATHY: Primary | ICD-10-CM

## 2024-09-23 ASSESSMENT — PAIN SCALES - GENERAL: PAINLEVEL: 4

## 2024-09-23 ASSESSMENT — PATIENT HEALTH QUESTIONNAIRE - PHQ9
SUM OF ALL RESPONSES TO PHQ9 QUESTIONS 1 & 2: 0
1. LITTLE INTEREST OR PLEASURE IN DOING THINGS: NOT AT ALL
2. FEELING DOWN, DEPRESSED OR HOPELESS: NOT AT ALL

## 2024-09-23 NOTE — PROGRESS NOTES
[facility] [place]  Department of Neurological Surgery  Established Patient Visit    History of Present Illness:  Caitlin Hogan is a 64 y.o. year old female who presents to the spine clinic in follow up with mechanical low back pain and left sided lumbar radiculopathy in the L3 and L4 distribution of the left leg. She reports she has been getting worse. She has done PT. Her injection in July 2024 gave her only 4 days of relief. She is here today to review her MRI and discuss surgery.     Patient's BMI is Body mass index is 22.67 kg/m².    Diabetic: no     Osteoporosis: no  No DXA results found for the past 12 months    Review of Systems:  14/14 systems reviewed and negative other than what is listed in the history of present illness    Patient Active Problem List   Diagnosis    Anxiety    Basal cell carcinoma    Benign hypertension    Cervical radiculitis    Cervical spondylosis without myelopathy    Chronic allergic rhinitis    Depression, major, single episode, moderate (Multi)    Generalized osteoarthritis of multiple sites    GERD (gastroesophageal reflux disease)    Hypothyroidism    Impingement syndrome of right shoulder    Insomnia    Irritable bowel syndrome with diarrhea    Osteoporosis    Fibromyalgia    Spider veins of limb    Steatorrhea ([facility])    Synovial plica syndrome of left knee    Tear of medial meniscus of right knee    Neurogenic claudication due to lumbar spinal stenosis    Displacement of lumbar intervertebral disc without myelopathy    Lumbar spondylosis    Lumbar radiculopathy     Past Medical History:   Diagnosis Date    Abdominal distension (gaseous) 05/03/2021    Bloating    ADHD (attention deficit hyperactivity disorder)     Allergic     Anxiety     Arthritis     Depression     Disease of thyroid gland     Hypertension     Personal history of other diseases of the circulatory system 01/19/2021    History of abnormal electrocardiography    Personal history of other  diseases of the respiratory system 2021    History of acute sinusitis    Personal history of other diseases of urinary system 2021    History of hematuria    Personal history of other endocrine, nutritional and metabolic disease     History of thyroid disorder    Unspecified abdominal pain 2021    Chronic abdominal pain     Past Surgical History:   Procedure Laterality Date     SECTION, LOW TRANSVERSE      HYSTERECTOMY      INJECTION N/A 2024    L4-5 MICHELE    IR INJECTION EPIDURAL STEROID Left 2024    Left L3/4-L4/5 TFESI    OTHER SURGICAL HISTORY  10/23/2019    Bursectomy    OTHER SURGICAL HISTORY  2017    Colonoscopy    OTHER SURGICAL HISTORY  10/23/2019    Colon surgery    OTHER SURGICAL HISTORY  10/23/2019     section    OTHER SURGICAL HISTORY  10/23/2019    Hysterectomy total    OTHER SURGICAL HISTORY  10/01/2021    Epidural steroid injection    OTHER SURGICAL HISTORY  2019    Epidural steroid injection    SMALL INTESTINE SURGERY       Social History     Tobacco Use    Smoking status: Former     Current packs/day: 1.50     Types: Cigarettes    Smokeless tobacco: Never   Substance Use Topics    Alcohol use: Yes     Alcohol/week: 2.0 standard drinks of alcohol     Types: 2 Cans of beer per week     Comment: occasionally     family history includes Aneurysm in her father; COPD in her father; Cancer in her brother and sister; Colon cancer in her brother and sister; Deep vein thrombosis in her sister; Heart disease in her father; Hypertension in her father, mother, and sister; Ovarian cancer in her sister; bladder cancer in her brother.    Current Outpatient Medications:     baclofen (Lioresal) 10 mg tablet, Take 1 tablet (10 mg) by mouth 3 times a day., Disp: 90 tablet, Rfl: 0    biotin 5 mg capsule, Take 1 capsule (5 mg) by mouth once daily., Disp: , Rfl:     calcium carbonate-vitamin D3 600 mg-5 mcg (200 unit) tablet, Take 1 tablet by mouth once daily.,  Disp: , Rfl:     fexofenadine (Allegra) 180 mg tablet, Take 1 tablet (180 mg) by mouth once daily as needed., Disp: , Rfl:     HYDROcodone-acetaminophen (Norco) 5-325 mg tablet, Take 1 tablet by mouth 3 times a day., Disp: 90 tablet, Rfl: 0    ibandronate (Boniva) 150 mg tablet, Take 1 tablet (150 mg) by mouth every 30 (thirty) days., Disp: 4 tablet, Rfl: 3    levothyroxine (Synthroid, Levoxyl) 100 mcg tablet, Take 1 tablet (100 mcg) by mouth once daily., Disp: 90 tablet, Rfl: 1    lisinopril 5 mg tablet, Take 1 tablet (5 mg) by mouth once daily., Disp: 90 tablet, Rfl: 1    meloxicam (Mobic) 15 mg tablet, Take 1 tablet (15 mg) by mouth once daily., Disp: 90 tablet, Rfl: 0    multivitamin tablet, Take 1 tablet by mouth once daily., Disp: , Rfl:     naloxone (Narcan) 4 mg/0.1 mL nasal spray, Administer 1 spray (4 mg) into affected nostril(s) if needed., Disp: , Rfl:     pregabalin (Lyrica) 25 mg capsule, Take 3 capsules (75 mg) by mouth 2 times a day., Disp: 180 capsule, Rfl: 0    zolpidem (Ambien) 5 mg tablet, Take 1 tablet (5 mg) by mouth as needed at bedtime for sleep. Do not fill before September 1, 2024., Disp: 30 tablet, Rfl: 0    diclofenac sodium (Voltaren) 1 % gel gel, Apply 1 Application topically 4 times a day as needed (joint pain). (Patient not taking: Reported on 9/23/2024), Disp: 100 g, Rfl: 11  Allergies   Allergen Reactions    Cephalexin Nausea Only       Physical Examination:    General: Well developed, awake/alert/oriented x3, no distress, alert and cooperative  Skin: Warm and dry, no lesions, no rashes  ENMT: Mucous membranes moist, no apparent injury, no lesions seen  Head/Neck: Neck Supple, no apparent injury  Respiratory/Thorax: Normal breath sounds with good chest expansion, thorax symmetric  Cardiovascular: No pitting edema, no JVD    Motor Strength: 5/5 Throughout all extremities    Muscle Bulk: Normal and symmetric in all extremities    Posture:   -- Cervical: Normal  -- Thoracic: Normal  --  Lumbar : Normal  Paraspinal muscle spasm/tenderness absent.     Sensation: intact to light touch    Lumbar radiculopathy in lateral aspect of left thigh    Results:  I personally reviewed and interpreted the imaging results which included MRI showing lateral recess disease at L3-4 L4-5 and synovial cyst on the L3-4 joint    Assessment and Plan:      Caitlin Hogan is a 64 y.o. year old female who presents to the spine clinic in follow up with mechanical low back pain and left sided lumbar radiculopathy in the L3 and L4 distribution of the left leg.    I have personally reviewed and interpreted the imaging and detailed diagnosis with the patient, discussed the natural history of their disease and both non-operative and operative treatments available and rationale and the risks for all options.    The patient’s clinical symptoms correlates well with the radiological findings. Patient has been having significant functional impairment with decreased ability to perform her normal activities of daily living. They have tried treatment options including medications (NSAIDs/narcotics/muscle relaxants/membrane stabilizers), formal physical therapy, and injections.    I offered the option of surgery that would consist of a left sided L3-4, L4-5 decompression.    I have explained the surgical procedure in detail with expected duration and extent of recovery along risks of surgery that include, but is not limited to bleeding, infection, blood vessel injury or damage, loss of sensation, loss of bladder, bowel or sexual function, nerve injury/damage resulting in weakness/paralysis, malunion, nonunion, CSF leak, brachial plexus injury, peripheral vision blindness, failure of implants/fusion, failure to relieve symptoms, recurrent disease, adjacent segment disease, need to reoperate for any reason and general anesthesia reaction such as stroke, coma, heart attack, delirium, confusion, death as well as worsening of preexisted  medical conditions.    I clearly emphasized that while the goal of surgery is to decompress the spinal cord so as to ARREST the progression of neurological deficits - preexisting deficits may or may not improve after surgery. We discussed that many patients do clinically improve in functional and neurological outcomes following decompression of the spinal elements in patients with lumbar degenerative disease or radiculopathy the extent of which is variable and depends on the severity of pain, numbness, tingling, or weakness. With improvement seen of those symptoms in that order. We did discuss the goal of surgery to alleviate pain first and foremost and hope for recovery of all neurologic function with time.    All questions were answered and the patient left satisfied with the surgical plan moving forward.      I have reviewed all prior documentation and reviewed the electronic medical record since admission. I have personally have reviewed all advanced imaging not just the reports and used my interpretation as documented as the relevant findings. I have reviewed the risks and benefits of all treatment recommendations listed in this note with the patient and family. I spent a total of 60 minutes in service to this patient's care during this date of service.      The above clinical summary has been dictated with voice recognition software. It has not been proofread for grammatical errors, typographical mistakes, or other semantic inconsistencies.    Thank you for visiting our office today. It was our pleasure to take part in your healthcare.     Do not hesitate to call with any questions regarding your plan of care after leaving at [phone] M-F 8am-4pm.     To clinicians, thank you very much for this kind referral. It is a privilege to partner with you in the care of your patients. My office would be delighted to assist you with any further consultations or with questions regarding the plan of care outlined. Do  not hesitate to call the office or contact me directly.       Sincerely,      Graeme Friedman MD, [facility]  Spine , [facility]  Landry Kirk Chair in Spinal Neurosurgery  Complex Spine Surgery Fellowship Director   of Neurological Surgery  [facility] [employer]  Phone: [phone]  Fax: [phone]        Scribe Attestation  By signing my name below, I, La Almanzar   attest that this documentation has been prepared under the direction and in the presence of Graeme Friedman MD.

## 2024-09-24 ENCOUNTER — LAB (OUTPATIENT)
Dept: LAB | Facility: LAB | Age: 64
End: 2024-09-24
Payer: COMMERCIAL

## 2024-09-24 DIAGNOSIS — I10 BENIGN HYPERTENSION: ICD-10-CM

## 2024-09-24 LAB
ALBUMIN SERPL BCP-MCNC: 4.1 G/DL (ref 3.4–5)
ALP SERPL-CCNC: 42 U/L (ref 33–136)
ALT SERPL W P-5'-P-CCNC: 10 U/L (ref 7–45)
ANION GAP SERPL CALC-SCNC: 9 MMOL/L (ref 10–20)
AST SERPL W P-5'-P-CCNC: 15 U/L (ref 9–39)
BILIRUB SERPL-MCNC: 0.7 MG/DL (ref 0–1.2)
BUN SERPL-MCNC: 8 MG/DL (ref 6–23)
CALCIUM SERPL-MCNC: 9 MG/DL (ref 8.6–10.3)
CHLORIDE SERPL-SCNC: 104 MMOL/L (ref 98–107)
CHOLEST SERPL-MCNC: 187 MG/DL (ref 0–199)
CHOLESTEROL/HDL RATIO: 2.9
CO2 SERPL-SCNC: 30 MMOL/L (ref 21–32)
CREAT SERPL-MCNC: 0.81 MG/DL (ref 0.5–1.05)
EGFRCR SERPLBLD CKD-EPI 2021: 82 ML/MIN/1.73M*2
ERYTHROCYTE [DISTWIDTH] IN BLOOD BY AUTOMATED COUNT: 13.6 % (ref 11.5–14.5)
GLUCOSE SERPL-MCNC: 86 MG/DL (ref 74–99)
HCT VFR BLD AUTO: 39.4 % (ref 36–46)
HDLC SERPL-MCNC: 64 MG/DL
HGB BLD-MCNC: 13.1 G/DL (ref 12–16)
LDLC SERPL CALC-MCNC: 96 MG/DL
MCH RBC QN AUTO: 29.9 PG (ref 26–34)
MCHC RBC AUTO-ENTMCNC: 33.2 G/DL (ref 32–36)
MCV RBC AUTO: 90 FL (ref 80–100)
NON HDL CHOLESTEROL: 123 MG/DL (ref 0–149)
NRBC BLD-RTO: 0 /100 WBCS (ref 0–0)
PLATELET # BLD AUTO: 207 X10*3/UL (ref 150–450)
POTASSIUM SERPL-SCNC: 4.2 MMOL/L (ref 3.5–5.3)
PROT SERPL-MCNC: 6.1 G/DL (ref 6.4–8.2)
RBC # BLD AUTO: 4.38 X10*6/UL (ref 4–5.2)
SODIUM SERPL-SCNC: 139 MMOL/L (ref 136–145)
TRIGL SERPL-MCNC: 135 MG/DL (ref 0–149)
VLDL: 27 MG/DL (ref 0–40)
WBC # BLD AUTO: 6 X10*3/UL (ref 4.4–11.3)

## 2024-09-24 PROCEDURE — 85027 COMPLETE CBC AUTOMATED: CPT

## 2024-09-24 PROCEDURE — 36415 COLL VENOUS BLD VENIPUNCTURE: CPT

## 2024-09-24 PROCEDURE — 80053 COMPREHEN METABOLIC PANEL: CPT

## 2024-09-24 PROCEDURE — 80061 LIPID PANEL: CPT

## 2024-09-26 ENCOUNTER — APPOINTMENT (OUTPATIENT)
Dept: PRIMARY CARE | Facility: CLINIC | Age: 64
End: 2024-09-26
Payer: COMMERCIAL

## 2024-09-26 VITALS
BODY MASS INDEX: 22.85 KG/M2 | SYSTOLIC BLOOD PRESSURE: 130 MMHG | DIASTOLIC BLOOD PRESSURE: 80 MMHG | HEART RATE: 85 BPM | WEIGHT: 129 LBS | OXYGEN SATURATION: 97 %

## 2024-09-26 DIAGNOSIS — M81.6 LOCALIZED OSTEOPOROSIS WITHOUT CURRENT PATHOLOGICAL FRACTURE: ICD-10-CM

## 2024-09-26 DIAGNOSIS — F41.9 ANXIETY: Primary | ICD-10-CM

## 2024-09-26 DIAGNOSIS — K58.0 IRRITABLE BOWEL SYNDROME WITH DIARRHEA: ICD-10-CM

## 2024-09-26 DIAGNOSIS — M67.52 SYNOVIAL PLICA SYNDROME OF LEFT KNEE: ICD-10-CM

## 2024-09-26 DIAGNOSIS — J30.9 CHRONIC ALLERGIC RHINITIS: ICD-10-CM

## 2024-09-26 DIAGNOSIS — M51.26 DISPLACEMENT OF LUMBAR INTERVERTEBRAL DISC WITHOUT MYELOPATHY: ICD-10-CM

## 2024-09-26 DIAGNOSIS — M47.812 CERVICAL SPONDYLOSIS WITHOUT MYELOPATHY: ICD-10-CM

## 2024-09-26 DIAGNOSIS — M79.7 FIBROMYALGIA: ICD-10-CM

## 2024-09-26 DIAGNOSIS — K21.9 GASTROESOPHAGEAL REFLUX DISEASE WITHOUT ESOPHAGITIS: ICD-10-CM

## 2024-09-26 DIAGNOSIS — M54.16 LUMBAR RADICULOPATHY: ICD-10-CM

## 2024-09-26 DIAGNOSIS — K90.9 STEATORRHEA (HHS-HCC): ICD-10-CM

## 2024-09-26 DIAGNOSIS — E03.9 ACQUIRED HYPOTHYROIDISM: ICD-10-CM

## 2024-09-26 DIAGNOSIS — M48.062 NEUROGENIC CLAUDICATION DUE TO LUMBAR SPINAL STENOSIS: ICD-10-CM

## 2024-09-26 DIAGNOSIS — I10 BENIGN HYPERTENSION: ICD-10-CM

## 2024-09-26 DIAGNOSIS — M54.12 CERVICAL RADICULITIS: ICD-10-CM

## 2024-09-26 DIAGNOSIS — Z23 IMMUNIZATION DUE: ICD-10-CM

## 2024-09-26 DIAGNOSIS — C44.311 BASAL CELL CARCINOMA (BCC) OF SKIN OF NOSE: ICD-10-CM

## 2024-09-26 DIAGNOSIS — M15.9 GENERALIZED OSTEOARTHRITIS OF MULTIPLE SITES: ICD-10-CM

## 2024-09-26 DIAGNOSIS — I78.1 SPIDER VEINS OF LIMB: ICD-10-CM

## 2024-09-26 DIAGNOSIS — F32.1 DEPRESSION, MAJOR, SINGLE EPISODE, MODERATE (MULTI): ICD-10-CM

## 2024-09-26 DIAGNOSIS — M75.41 IMPINGEMENT SYNDROME OF RIGHT SHOULDER: ICD-10-CM

## 2024-09-26 DIAGNOSIS — F51.04 PSYCHOPHYSIOLOGICAL INSOMNIA: ICD-10-CM

## 2024-09-26 PROCEDURE — 3079F DIAST BP 80-89 MM HG: CPT | Performed by: FAMILY MEDICINE

## 2024-09-26 PROCEDURE — 1036F TOBACCO NON-USER: CPT | Performed by: FAMILY MEDICINE

## 2024-09-26 PROCEDURE — 90471 IMMUNIZATION ADMIN: CPT | Performed by: FAMILY MEDICINE

## 2024-09-26 PROCEDURE — 99215 OFFICE O/P EST HI 40 MIN: CPT | Performed by: FAMILY MEDICINE

## 2024-09-26 PROCEDURE — 3075F SYST BP GE 130 - 139MM HG: CPT | Performed by: FAMILY MEDICINE

## 2024-09-26 PROCEDURE — 90656 IIV3 VACC NO PRSV 0.5 ML IM: CPT | Performed by: FAMILY MEDICINE

## 2024-09-26 RX ORDER — IBANDRONATE SODIUM 150 MG/1
150 TABLET, FILM COATED ORAL
Qty: 4 TABLET | Refills: 3 | Status: SHIPPED | OUTPATIENT
Start: 2024-09-26

## 2024-09-26 NOTE — PROGRESS NOTES
Subjective   Patient ID: Caitlin Hogan is a 64 y.o. female who presents for Med Management (Discuss appt with Neuro surgeon).    HPI   IBS/ Steatorrhea - Celiac panel negative. Bowels are still messed up in that she has loose stools in the AM up to 5 per day. Cramps. Coffee will cause this. CT was normal except for the diverticulosis. Scope in 2017. No blood. Cologuard was negative. Getting fiber. Bloated and cramping lower abdomen. Aggravated by Zithromax for the URI. She saw Dr Gonsales and has started Papaya. Did not help. But doing better now. Rifaximin for a couple weeks did help but still comes and goes. She was on Protonix per Dr Lorenz. Helped the stomach feel some better. Did have lots  of stress with brother dying and being executor. That has been resolved.  A sister  and another brother has cancer. Caring for her father who was in the hospital for 6 days.  She had normal colonoscopy in 2023 Ice cream clearly aggravates. Has not tried Gluten limiting but has tried limiting milk.  Can try low FODMPS      Anxiety and insomnia has been some better since she retired and can feel her body changing inside. Still stress with  LBP below and kids overseas with . Still daily zolpidem.  She was not able to get it once for 2 weeks and when off was able to sleep. She can try off of it more. But has helped along with Melatonin. Helps to shut down. Still wakes in the night. Several family members with cancer in the last 5 years of bladder, colon and ovary so has concerns for herself. All were around 60 years old. Several with colon cancer. She is less tired without the work and stress of work.  Was very loud at work and does not feel she is getting support with difficult kids. She retired in 2023.   Was on Wellbutrin but did not help. Feels she has been losing weight. Down 9 pound over a year but stable this time.   Feels stress this contributes to the neck pain. She does snore.  Has  been to ENT. Checked for sleep apnea in past. Does not want testing at present.     Basal cell carcinoma of nose - no skin lesions. Dr Harris booth a couple. Annual visits. Had a lesion biopsied and benign on face a few months ago. Chemo cream  for spots on chest and face.     Cervicalgia - she was back at work and worse. She is off work now and feels she would not be able to work.  Also with traveling gets worse. Had injections multiple times. Painful at end of day and headaches. Has had therapy which has not been really helpful. Does do aquatics. Bike riding aggravates. Pain to shoulder on left. MRI in 2017 showed moderate C5 foramen stenosis. Otherwise mild stenosis at multiple levels.  Has been to Dr White for injections several years ago. And then to Dr Doe and had injection in 2019. She is to have a series of injections based on MRI in 2021 showing again multilevel cervical spondylosis and stenosis with Dr Cano   A recent MRI on 2/14/23 did not show anything more than moderate stenosis at C5.  Did one injection and ablation. They have started to discuss  surgery.  Pain level 2-8 and drops 1-2 for 4-6hours. This is more in low back.  Dr Rodríguez did increase Lyrica as he feels she has fibromyalgia.  Norco and Lyrica allow her to walk better and do house and lawn and garden and to stand and sit longer.  With the LBP below she is not able to function with some days even with meds. Would like to ride a motorcycle,  gardening, ride longer distances, kayak and gardening. Has lost muscle mass and concerned.      Chronic rhinitis - running nose when outside is cold or hot and with pollen in summer. Allegra helps when taken as needed.  Has been to ENT      Depressive disorder - worries as above. No meds. Better since not working.  Feels she need to see a counselor and will consider when less active. Feels she is down as she is not doing things with being retired. Just discouraged with the pain     Family history of  multiple cancers in sibling. So she had genetics eval with Dr Munoz and no inherited disorder.      Generalized osteoarthrosis, involving multiple sites -Knee feels like the patella scrapes at times. Pain in the right hip over greater trochanter. Has had the bursa removed. Did help but now pain again and not sure if the bursa recurrence or from the back. All positional and activity related. Not every day. Was with Dr Tenorio. She did retire in 2023      Hypothyroidism NOS - Good TSH in September 2023  on current dose      Insomnia - gets to sleep and stays asleep for 6 hours Ambien and back on Melatonin. Wakes with less anxiety., Did go 2 weeks without and not too bad. She will try without over the next 3 months      Low back pain worse now that she is active around the house.  Worse after mowing.  Pain down the left leg. She says she would like to walk 2 miles. Has been over a year. Constant pain.  Did see Chelsea in Dr Cano's office. MRI showed herniated discs. Referred to Dr Graeme Friedman who did an injection with no relief.  Did have MRI June 20.  Saw Dr Rodríguez who did send to therapy in order to get that MRI that shows foraminal stenosis and recommending that she have a laminectomy.  She is finding aquatics help some at [facility].  Did an injection that did not help. Norco tid for pain helps some - see above.  No numbness or weakness. Mid back hurts after standing. Would like to get off of medication.      Lump or mass in breast - good Mammogram in January. Also Pap last fall. Had dysuria that went away as did the blood.      Menopause - no hot flashes.      MVP (mitral valve prolapse) - Fatigue in the chest at times better with BP control. Not pain. Short of breath at times. Flutters at times with anxiety. . No edema. All better since retired      Osteopenia - on DEXA 9/29/22. She is on Vitamin D and Calcium. Not able to get Prolia. FRAX is 15/6%. Ibandronate now. Will check next time     Pancreatitis -  nothing recent. But says she has  been belching a lot lately and has a pain in left chest when she tips to the side. No heartburn.  No dysphagia..  No blood in stools.     Raynauds with cold pain      Renal Cyst noted on CT this year. CMP WNL.       Spider veins of limb - not sensitive      Tear of medial meniscus of right knee -Dr Tenorio. This is doing better but clicks a lot.  Not painful      Former smoker. 16 years ago.,  She smoked 1- 1.5 PPD for 30+.      I have personally reviewed the patients OARRS report. . This report is filed in the EHR. I have considered the risks of abuse, addiction and diversion. I believe it is clinically appropriate to continue to prescribe this medication.  Discussed frequent one day early fills.      UDS 10/20/20 No Norco as cutting back 12/9/21 as expected for medication profile1/13/23 as expected for medication profile 3/25/24 as expected for medication profile   CSA 12/4/23 for Ambien;  CSA 3/25/24  for Norco   ORT 4/27/20 score 2  Mammogram 1/9/24  scope 2/22/23   Lipids 9/24/24    Review of Systems    Objective   /80 (BP Location: Left arm, Patient Position: Sitting)   Pulse 85   Wt 58.5 kg (129 lb)   SpO2 97%   BMI 22.85 kg/m²     Physical Exam  Vitals reviewed.   Constitutional:       General: She is not in acute distress.     Appearance: Normal appearance.   HENT:      Head: Normocephalic.      Right Ear: Tympanic membrane, ear canal and external ear normal.      Left Ear: Tympanic membrane, ear canal and external ear normal.      Nose: Nose normal.      Mouth/Throat:      Mouth: Mucous membranes are moist.      Pharynx: Oropharynx is clear.   Eyes:      Extraocular Movements: Extraocular movements intact.      Conjunctiva/sclera: Conjunctivae normal.      Pupils: Pupils are equal, round, and reactive to light.   Neck:      Vascular: No carotid bruit.   Cardiovascular:      Rate and Rhythm: Normal rate and regular rhythm.      Pulses: Normal pulses.      Heart  sounds: Normal heart sounds. No murmur heard.  Pulmonary:      Effort: Pulmonary effort is normal. No respiratory distress.      Breath sounds: Normal breath sounds.   Abdominal:      General: Abdomen is flat. Bowel sounds are normal. There is no distension.      Palpations: Abdomen is soft. There is no mass.      Tenderness: There is no abdominal tenderness.   Musculoskeletal:         General: Tenderness (paralumbar) present.      Cervical back: Normal range of motion and neck supple. Tenderness present.      Comments: SLR WNL   Left knee with negative Liliana but lateral laxity    Lymphadenopathy:      Cervical: No cervical adenopathy.   Skin:     General: Skin is warm and dry.      Findings: No rash.   Neurological:      General: No focal deficit present.      Mental Status: She is alert and oriented to person, place, and time.   Psychiatric:         Mood and Affect: Mood normal.         Thought Content: Thought content normal.         Judgment: Judgment normal.         Assessment/Plan   Diagnoses and all orders for this visit:  Anxiety  Cervical spondylosis without myelopathy  -     Follow Up In Primary Care - Established  Localized osteoporosis without current pathological fracture  -     ibandronate (Boniva) 150 mg tablet; Take 1 tablet (150 mg) by mouth every 30 (thirty) days.  Basal cell carcinoma (BCC) of skin of nose  Benign hypertension  Cervical radiculitis  Chronic allergic rhinitis  Depression, major, single episode, moderate (Multi)  Displacement of lumbar intervertebral disc without myelopathy  Fibromyalgia  Generalized osteoarthritis of multiple sites  Gastroesophageal reflux disease without esophagitis  Acquired hypothyroidism  Impingement syndrome of right shoulder  Psychophysiological insomnia  Irritable bowel syndrome with diarrhea  Lumbar radiculopathy  Neurogenic claudication due to lumbar spinal stenosis  Spider veins of limb  Steatorrhea ([facility])  Synovial plica syndrome of left knee

## 2024-10-02 DIAGNOSIS — M51.26 DISPLACEMENT OF LUMBAR INTERVERTEBRAL DISC WITHOUT MYELOPATHY: ICD-10-CM

## 2024-10-02 DIAGNOSIS — M47.816 LUMBAR SPONDYLOSIS: ICD-10-CM

## 2024-10-02 RX ORDER — PREGABALIN 25 MG/1
75 CAPSULE ORAL 2 TIMES DAILY
Qty: 180 CAPSULE | Refills: 0 | Status: SHIPPED | OUTPATIENT
Start: 2024-10-02 | End: 2024-11-01

## 2024-10-08 DIAGNOSIS — G47.00 INSOMNIA, UNSPECIFIED TYPE: ICD-10-CM

## 2024-10-08 RX ORDER — ZOLPIDEM TARTRATE 5 MG/1
5 TABLET ORAL NIGHTLY PRN
Qty: 30 TABLET | Refills: 0 | Status: SHIPPED | OUTPATIENT
Start: 2024-10-08 | End: 2024-11-07

## 2024-10-11 ENCOUNTER — TELEPHONE (OUTPATIENT)
Dept: PAIN MEDICINE | Facility: CLINIC | Age: 64
End: 2024-10-11
Payer: COMMERCIAL

## 2024-10-14 DIAGNOSIS — M54.12 CERVICAL RADICULITIS: ICD-10-CM

## 2024-10-14 RX ORDER — BACLOFEN 10 MG/1
10 TABLET ORAL 3 TIMES DAILY
Qty: 90 TABLET | Refills: 0 | Status: SHIPPED | OUTPATIENT
Start: 2024-10-14

## 2024-10-15 ENCOUNTER — TELEPHONE (OUTPATIENT)
Dept: NEUROSURGERY | Facility: CLINIC | Age: 64
End: 2024-10-15
Payer: COMMERCIAL

## 2024-10-16 DIAGNOSIS — M54.16 LUMBAR RADICULOPATHY: Primary | ICD-10-CM

## 2024-10-16 RX ORDER — CELECOXIB 400 MG/1
400 CAPSULE ORAL ONCE
OUTPATIENT
Start: 2024-10-16 | End: 2024-10-16

## 2024-10-16 RX ORDER — TRANEXAMIC ACID 650 MG/1
1300 TABLET ORAL ONCE
OUTPATIENT
Start: 2024-10-16 | End: 2024-10-16

## 2024-10-16 RX ORDER — ACETAMINOPHEN 325 MG/1
975 TABLET ORAL ONCE
OUTPATIENT
Start: 2024-10-16 | End: 2024-10-16

## 2024-10-18 DIAGNOSIS — M54.2 CERVICALGIA: ICD-10-CM

## 2024-10-18 RX ORDER — HYDROCODONE BITARTRATE AND ACETAMINOPHEN 5; 325 MG/1; MG/1
1 TABLET ORAL 3 TIMES DAILY
Qty: 90 TABLET | Refills: 0 | Status: SHIPPED | OUTPATIENT
Start: 2024-10-18

## 2024-10-24 DIAGNOSIS — Z12.11 COLON CANCER SCREENING: Primary | ICD-10-CM

## 2024-11-02 ENCOUNTER — TELEPHONE (OUTPATIENT)
Dept: PAIN MEDICINE | Facility: CLINIC | Age: 64
End: 2024-11-02

## 2024-11-02 DIAGNOSIS — M54.12 CERVICAL RADICULITIS: ICD-10-CM

## 2024-11-04 RX ORDER — BACLOFEN 10 MG/1
10 TABLET ORAL 3 TIMES DAILY
Qty: 90 TABLET | Refills: 0 | OUTPATIENT
Start: 2024-11-04

## 2024-11-05 DIAGNOSIS — G47.00 INSOMNIA, UNSPECIFIED TYPE: ICD-10-CM

## 2024-11-05 RX ORDER — ZOLPIDEM TARTRATE 5 MG/1
5 TABLET ORAL NIGHTLY PRN
Qty: 30 TABLET | Refills: 0 | Status: SHIPPED | OUTPATIENT
Start: 2024-11-05 | End: 2024-12-05

## 2024-11-07 DIAGNOSIS — M25.551 HIP PAIN, RIGHT: ICD-10-CM

## 2024-11-07 RX ORDER — MELOXICAM 15 MG/1
15 TABLET ORAL DAILY
Qty: 90 TABLET | Refills: 0 | Status: SHIPPED | OUTPATIENT
Start: 2024-11-07

## 2024-11-11 DIAGNOSIS — M51.26 DISPLACEMENT OF LUMBAR INTERVERTEBRAL DISC WITHOUT MYELOPATHY: ICD-10-CM

## 2024-11-11 DIAGNOSIS — M47.816 LUMBAR SPONDYLOSIS: ICD-10-CM

## 2024-11-11 RX ORDER — PREGABALIN 25 MG/1
75 CAPSULE ORAL 2 TIMES DAILY
Qty: 180 CAPSULE | Refills: 1 | Status: SHIPPED | OUTPATIENT
Start: 2024-11-11 | End: 2025-01-10

## 2024-11-14 LAB — NONINV COLON CA DNA+OCC BLD SCRN STL QL: NEGATIVE

## 2024-11-19 DIAGNOSIS — M54.2 CERVICALGIA: ICD-10-CM

## 2024-11-19 RX ORDER — HYDROCODONE BITARTRATE AND ACETAMINOPHEN 5; 325 MG/1; MG/1
1 TABLET ORAL 3 TIMES DAILY
Qty: 90 TABLET | Refills: 0 | Status: SHIPPED | OUTPATIENT
Start: 2024-11-19

## 2024-11-26 ENCOUNTER — HOSPITAL ENCOUNTER (OUTPATIENT)
Dept: RADIOLOGY | Facility: HOSPITAL | Age: 64
Discharge: HOME | End: 2024-11-26
Payer: COMMERCIAL

## 2024-11-26 ENCOUNTER — PRE-ADMISSION TESTING (OUTPATIENT)
Dept: PREADMISSION TESTING | Facility: HOSPITAL | Age: 64
End: 2024-11-26
Payer: COMMERCIAL

## 2024-11-26 ENCOUNTER — LAB (OUTPATIENT)
Dept: LAB | Facility: LAB | Age: 64
End: 2024-11-26
Payer: COMMERCIAL

## 2024-11-26 VITALS
SYSTOLIC BLOOD PRESSURE: 146 MMHG | HEIGHT: 63 IN | WEIGHT: 130.6 LBS | DIASTOLIC BLOOD PRESSURE: 65 MMHG | TEMPERATURE: 98.6 F | BODY MASS INDEX: 23.14 KG/M2 | RESPIRATION RATE: 14 BRPM | OXYGEN SATURATION: 97 % | HEART RATE: 72 BPM

## 2024-11-26 DIAGNOSIS — M54.16 LUMBAR RADICULOPATHY: ICD-10-CM

## 2024-11-26 DIAGNOSIS — Z01.818 PRE-OP TESTING: ICD-10-CM

## 2024-11-26 DIAGNOSIS — Z01.818 PREOP EXAMINATION: ICD-10-CM

## 2024-11-26 DIAGNOSIS — Z01.818 PREOP EXAMINATION: Primary | ICD-10-CM

## 2024-11-26 DIAGNOSIS — E03.9 ACQUIRED HYPOTHYROIDISM: ICD-10-CM

## 2024-11-26 LAB
ABO GROUP (TYPE) IN BLOOD: NORMAL
ANION GAP SERPL CALC-SCNC: 10 MMOL/L (ref 10–20)
ANTIBODY SCREEN: NORMAL
APTT PPP: 31 SECONDS (ref 27–38)
BUN SERPL-MCNC: 9 MG/DL (ref 6–23)
CALCIUM SERPL-MCNC: 9.5 MG/DL (ref 8.6–10.3)
CHLORIDE SERPL-SCNC: 102 MMOL/L (ref 98–107)
CO2 SERPL-SCNC: 29 MMOL/L (ref 21–32)
CREAT SERPL-MCNC: 0.74 MG/DL (ref 0.5–1.05)
EGFRCR SERPLBLD CKD-EPI 2021: >90 ML/MIN/1.73M*2
ERYTHROCYTE [DISTWIDTH] IN BLOOD BY AUTOMATED COUNT: 13.8 % (ref 11.5–14.5)
GLUCOSE SERPL-MCNC: 107 MG/DL (ref 74–99)
HCT VFR BLD AUTO: 39.8 % (ref 36–46)
HGB BLD-MCNC: 12.9 G/DL (ref 12–16)
INR PPP: 0.9 (ref 0.9–1.1)
MCH RBC QN AUTO: 28.9 PG (ref 26–34)
MCHC RBC AUTO-ENTMCNC: 32.4 G/DL (ref 32–36)
MCV RBC AUTO: 89 FL (ref 80–100)
NRBC BLD-RTO: 0 /100 WBCS (ref 0–0)
PLATELET # BLD AUTO: 232 X10*3/UL (ref 150–450)
POTASSIUM SERPL-SCNC: 3.9 MMOL/L (ref 3.5–5.3)
PROTHROMBIN TIME: 10.3 SECONDS (ref 9.8–12.8)
RBC # BLD AUTO: 4.46 X10*6/UL (ref 4–5.2)
RH FACTOR (ANTIGEN D): NORMAL
SODIUM SERPL-SCNC: 137 MMOL/L (ref 136–145)
TSH SERPL-ACNC: 1.08 MIU/L (ref 0.44–3.98)
WBC # BLD AUTO: 8.3 X10*3/UL (ref 4.4–11.3)

## 2024-11-26 PROCEDURE — 87081 CULTURE SCREEN ONLY: CPT | Mod: STJLAB

## 2024-11-26 PROCEDURE — 85610 PROTHROMBIN TIME: CPT

## 2024-11-26 PROCEDURE — 86901 BLOOD TYPING SEROLOGIC RH(D): CPT

## 2024-11-26 PROCEDURE — 71046 X-RAY EXAM CHEST 2 VIEWS: CPT

## 2024-11-26 PROCEDURE — 93010 ELECTROCARDIOGRAM REPORT: CPT | Performed by: INTERNAL MEDICINE

## 2024-11-26 PROCEDURE — 86900 BLOOD TYPING SEROLOGIC ABO: CPT

## 2024-11-26 PROCEDURE — 84134 ASSAY OF PREALBUMIN: CPT

## 2024-11-26 PROCEDURE — 83036 HEMOGLOBIN GLYCOSYLATED A1C: CPT

## 2024-11-26 PROCEDURE — 80048 BASIC METABOLIC PNL TOTAL CA: CPT

## 2024-11-26 PROCEDURE — 86850 RBC ANTIBODY SCREEN: CPT

## 2024-11-26 PROCEDURE — 36415 COLL VENOUS BLD VENIPUNCTURE: CPT

## 2024-11-26 PROCEDURE — 85027 COMPLETE CBC AUTOMATED: CPT

## 2024-11-26 PROCEDURE — 84443 ASSAY THYROID STIM HORMONE: CPT

## 2024-11-26 PROCEDURE — 99202 OFFICE O/P NEW SF 15 MIN: CPT | Performed by: NURSE PRACTITIONER

## 2024-11-26 PROCEDURE — 85730 THROMBOPLASTIN TIME PARTIAL: CPT

## 2024-11-26 PROCEDURE — 93005 ELECTROCARDIOGRAM TRACING: CPT

## 2024-11-26 RX ORDER — CHLORHEXIDINE GLUCONATE ORAL RINSE 1.2 MG/ML
SOLUTION DENTAL
Qty: 473 ML | Refills: 0 | Status: SHIPPED | OUTPATIENT
Start: 2024-11-26

## 2024-11-26 RX ORDER — VIT C/E/ZN/COPPR/LUTEIN/ZEAXAN 250MG-90MG
25 CAPSULE ORAL DAILY
COMMUNITY

## 2024-11-26 ASSESSMENT — DUKE ACTIVITY SCORE INDEX (DASI)
CAN YOU TAKE CARE OF YOURSELF (EAT, DRESS, BATHE, OR USE TOILET): YES
CAN YOU WALK INDOORS, SUCH AS AROUND YOUR HOUSE: YES
CAN YOU RUN A SHORT DISTANCE: NO
CAN YOU HAVE SEXUAL RELATIONS: YES
DASI METS SCORE: 7.2
CAN YOU PARTICIPATE IN STRENOUS SPORTS LIKE SWIMMING, SINGLES TENNIS, FOOTBALL, BASKETBALL, OR SKIING: YES
CAN YOU DO LIGHT WORK AROUND THE HOUSE LIKE DUSTING OR WASHING DISHES: YES
CAN YOU PARTICIPATE IN MODERATE RECREATIONAL ACTIVITIES LIKE GOLF, BOWLING, DANCING, DOUBLES TENNIS OR THROWING A BASEBALL OR FOOTBALL: NO
CAN YOU WALK A BLOCK OR TWO ON LEVEL GROUND: YES
CAN YOU DO YARD WORK LIKE RAKING LEAVES, WEEDING OR PUSHING A MOWER: YES
CAN YOU CLIMB A FLIGHT OF STAIRS OR WALK UP A HILL: YES
TOTAL_SCORE: 36.2
CAN YOU DO MODERATE WORK AROUND THE HOUSE LIKE VACUUMING, SWEEPING FLOORS OR CARRYING GROCERIES: YES
CAN YOU DO HEAVY WORK AROUND THE HOUSE LIKE SCRUBBING FLOORS OR LIFTING AND MOVING HEAVY FURNITURE: NO

## 2024-11-26 ASSESSMENT — LIFESTYLE VARIABLES: SMOKING_STATUS: NONSMOKER

## 2024-11-26 ASSESSMENT — PAIN SCALES - GENERAL: PAINLEVEL_OUTOF10: 0 - NO PAIN

## 2024-11-26 ASSESSMENT — PAIN - FUNCTIONAL ASSESSMENT: PAIN_FUNCTIONAL_ASSESSMENT: 0-10

## 2024-11-26 ASSESSMENT — ACTIVITIES OF DAILY LIVING (ADL): ADL_SCORE: 0

## 2024-11-26 NOTE — CPM/PAT H&P
CPM/PAT Evaluation       Name: Caitlin Hogan (Caitlin Hogan)  /Age: 1960/64 y.o.     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 Her symptoms are worsening with radiating numbness & tingling down the left thigh. She has been following pain management with previous steroid injections received, last injection was over 6 months ago.  Imaging shows lumbar spine disease.  Treatment options discussed with neurosurgery and subsequently she is scheduled for a lumbar left sided L3-L5 laminotomies. Presents to [facility] [place] for perioperative risk stratification and optimization. She denies any recent falls with use of assisted devices. Otherwise denies any recent illness, fever/chills, chest pains or shortness of breath.      Past Medical History:   Diagnosis Date    Abdominal distension (gaseous) 2021    Bloating    ADHD (attention deficit hyperactivity disorder)     Allergic     Anxiety     Arthritis     Basal cell carcinoma     NOSE    Cervical disc disorder     Cervicalgia     Depression     Disease of thyroid gland     GERD (gastroesophageal reflux disease)     Hypertension     Hypothyroidism     Insomnia     Low back pain     Lumbar radiculopathy     Mitral valve prolapse     Osteopenia     Personal history of other diseases of the circulatory system 2021    History of abnormal electrocardiography    Personal history of other diseases of the respiratory system 2021    History of acute sinusitis    Personal history of other diseases of urinary system 2021    History of hematuria    Personal history of other endocrine, nutritional and metabolic disease     History of thyroid disorder    Raynaud's syndrome     Unspecified abdominal pain 2021    Chronic abdominal pain       Past Surgical History:   Procedure Laterality Date     SECTION, LOW  TRANSVERSE      HYSTERECTOMY      INJECTION N/A 2024    L4-5 MICHELE    IR INJECTION EPIDURAL STEROID Left 2024    Left L3/4-L4/5 TFESI    OTHER SURGICAL HISTORY  10/23/2019    Bursectomy    OTHER SURGICAL HISTORY  2017    Colonoscopy    OTHER SURGICAL HISTORY  10/23/2019    Colon surgery    OTHER SURGICAL HISTORY  10/23/2019     section    OTHER SURGICAL HISTORY  10/23/2019    Hysterectomy total    OTHER SURGICAL HISTORY  10/01/2021    Epidural steroid injection    OTHER SURGICAL HISTORY  2019    Epidural steroid injection    SMALL INTESTINE SURGERY         Patient  reports being sexually active.    Family History   Problem Relation Name Age of Onset    Hypertension Mother Lexus     Dementia Mother Lexus     Hypertension Father Tristan     COPD Father Tristan     Heart disease Father Tristan     Aneurysm Father Tristan     Hypertension Sister Stefania     Deep vein thrombosis Sister Stefania     Ovarian cancer Sister Stefania     Cancer Brother Elio     Colon cancer Brother Elio     Other (bladder cancer) Brother Elio     Cancer Sister Lexus     Colon cancer Sister Lexus        Allergies   Allergen Reactions    Cephalexin Nausea Only       Prior to Admission medications    Medication Sig Start Date End Date Taking? Authorizing Provider   baclofen (Lioresal) 10 mg tablet Take 1 tablet (10 mg) by mouth 3 times a day. 10/14/24   CASA Ramos-CNP   biotin 5 mg capsule Take 1 capsule (5 mg) by mouth once daily.    Historical Provider, MD   calcium carbonate-vitamin D3 600 mg-5 mcg (200 unit) tablet Take 1 tablet by mouth once daily. 19   Historical Provider, MD   diclofenac sodium (Voltaren) 1 % gel gel Apply 1 Application topically 4 times a day as needed (joint pain). 23  Riley Leon MD   fexofenadine (Allegra) 180 mg tablet Take 1 tablet (180 mg) by mouth once daily as needed.    Historical Provider, MD   HYDROcodone-acetaminophen (Norco) 5-325 mg tablet Take 1 tablet by mouth  3 times a day. 11/19/24   Riley Leon MD   ibandronate (Boniva) 150 mg tablet Take 1 tablet (150 mg) by mouth every 30 (thirty) days. 9/26/24   Riley Leon MD   levothyroxine (Synthroid, Levoxyl) 100 mcg tablet Take 1 tablet (100 mcg) by mouth once daily. 7/8/24   Riley Leon MD   lisinopril 5 mg tablet Take 1 tablet (5 mg) by mouth once daily. 7/8/24   Riley Leon MD   meloxicam (Mobic) 15 mg tablet Take 1 tablet (15 mg) by mouth once daily. 11/7/24   Dain Yoon MD   multivitamin tablet Take 1 tablet by mouth once daily. 12/2/19   Historical Provider, MD   naloxone (Narcan) 4 mg/0.1 mL nasal spray Administer 1 spray (4 mg) into affected nostril(s) if needed. 4/27/20   Historical Provider, MD   pregabalin (Lyrica) 25 mg capsule Take 3 capsules (75 mg) by mouth 2 times a day. 11/11/24 1/10/25  Saadia Feng, APRN-CNP   zolpidem (Ambien) 5 mg tablet Take 1 tablet (5 mg) by mouth as needed at bedtime for sleep. 11/5/24 12/5/24  Dain Yoon MD        PAT ROS   Constitutional: Negative for fever, chills, or sweats   ENMT: Negative for nasal discharge, congestion, ear pain, mouth pain, throat pain   Respiratory: Negative for cough, wheezing, shortness of breath   Cardiac: Negative for chest pain, dyspnea on exertion, palpitations   Gastrointestinal: Negative for nausea, vomiting, diarrhea, constipation, abdominal pain  Genitourinary: Negative for dysuria, flank pain, frequency, hematuria     Musculoskeletal: Positive for decreased ROM, pain, swelling, weakness in lower back with numbness & tingling into left thigh     Neurological: Negative for dizziness, confusion, headache  Psychiatric: Negative for mood changes   Skin: Negative for itching, rash, ulcer    Hematologic/Lymph: Negative for bruising, easy bleeding  Allergic/Immunologic: Negative itching, sneezing, swelling      Physical Exam  Constitutional:       Appearance: Normal appearance.   HENT:      Head: Normocephalic.       Mouth/Throat:      Mouth: Mucous membranes are moist.   Eyes:      Extraocular Movements: Extraocular movements intact.   Cardiovascular:      Rate and Rhythm: Normal rate and regular rhythm.   Pulmonary:      Effort: Pulmonary effort is normal.      Breath sounds: Normal breath sounds.   Abdominal:      General: Abdomen is flat.      Palpations: Abdomen is soft.   Musculoskeletal:      Cervical back: Normal range of motion.      Lumbar back: Decreased range of motion.   Skin:     General: Skin is warm and dry.   Neurological:      General: No focal deficit present.      Mental Status: She is alert.   Psychiatric:         Mood and Affect: Mood normal.        PAT AIRWAY:   Airway:     Mallampati::  II    Neck ROM::  Full  normal      Testing/Diagnostic:   Stress test in 2020  IMPRESSION:  1.  Normal myocardial perfusion study without evidence of ischemia or  prior infarction.  2. The left ventricle is normal in size.  3. Normal LV wall motion with an LV EF estimated at greater than 65%  at both rest and stress    Patient Specialist/PCP:   PCP: Dr. Leon  Pain management: Dr. Rodríguez    Visit Vitals  /65   Pulse 72   Temp 37 °C (98.6 °F) (Temporal)   Resp 14       DASI Risk Score      Flowsheet Row Pre-Admission Testing from [place]26/2024 [place] [facility] [place]   Can you take care of yourself (eat, dress, bathe, or use toilet)?  2.75 filed at 11/26/2024 1114   Can you walk indoors, such as around your house? 1.75 filed at 11/26/2024 1114   Can you walk a block or two on level ground?  2.75 filed at 11/26/2024 1114   Can you climb a flight of stairs or walk up a hill? 5.5 filed at 11/26/2024 1114   Can you run a short distance? 0 filed at 11/26/2024 1114   Can you do light work around the house like dusting or washing dishes? 2.7 filed at 11/26/2024 1114   Can you do moderate work around the house like vacuuming, sweeping floors or carrying groceries? 3.5 filed at 11/26/2024 1114   Can you do heavy work around  the house like scrubbing floors or lifting and moving heavy furniture?  0 filed at 11/26/2024 1114   Can you do yard work like raking leaves, weeding or pushing a mower? 4.5 filed at 11/26/2024 1114   Can you have sexual relations? 5.25 filed at 11/26/2024 1114   Can you participate in moderate recreational activities like golf, bowling, dancing, doubles tennis or throwing a baseball or football? 0 filed at 11/26/2024 1114   Can you participate in strenous sports like swimming, singles tennis, football, basketball, or skiing? 7.5 filed at 11/26/2024 1114   DASI SCORE 36.2 filed at 11/26/2024 1114   METS Score (Will be calculated only when all the questions are answered) 7.2 filed at 11/26/2024 1114          Caprini DVT Assessment      Flowsheet Row Pre-Admission Testing from 11/26/2024 in [facility] [place]   DVT Score 5 filed at 11/26/2024 1114   Surgical Factors Major surgery planned, including arthroscopic and laproscopic (1-2 hours) filed at 11/26/2024 1114   BMI 30 or less filed at 11/26/2024 1114          Modified Frailty Index      Flowsheet Row Pre-Admission Testing from 11/26/2024 in [facility] [place]   Non-independent functional status (problems with dressing, bathing, personal grooming, or cooking) 0 filed at 11/26/2024 1115   History of diabetes mellitus  0 filed at 11/26/2024 1115   History of COPD 0 filed at 11/26/2024 1115   History of CHF No filed at 11/26/2024 1115   History of MI 0 filed at 11/26/2024 1115   History of Percutaneous Coronary Intervention, Cardiac Surgery, or Angina No filed at 11/26/2024 1115   Hypertension requiring the use of medication  0.0909 filed at 11/26/2024 1115   Peripheral vascular disease 0 filed at 11/26/2024 1115   Impaired sensorium (cognitive impairement or loss, clouding, or delirium) 0 filed at 11/26/2024 1115   TIA or CVA withouy residual deficit 0 filed at 11/26/2024 1115   Cerebrovascular accident with deficit 0 filed at 11/26/2024 1112    Modified Frailty Index Calculator .0909 filed at 11/26/2024 1115          CHADS2 Stroke Risk  Current as of today        N/A 3 to 100%: High Risk   2 to < 3%: Medium Risk   0 to < 2%: Low Risk     Last Change: N/A          This score determines the patient's risk of having a stroke if the patient has atrial fibrillation.        This score is not applicable to this patient. Components are not calculated.          Revised Cardiac Risk Index      Flowsheet Row Pre-Admission Testing from 11/26/2024 in [facility]   High-Risk Surgery (Intraperitoneal, Intrathoracic,Suprainguinal vascular) 0 filed at 11/26/2024 1115   History of ischemic heart disease (History of MI, History of positive exercuse test, Current chest paint considered due to myocardial ischemia, Use of nitrate therapy, ECG with pathological Q Waves) 0 filed at 11/26/2024 1115   History of congestive heart failure (pulmonary edemia, bilateral rales or S3 gallop, Paroxysmal nocturnal dyspnea, CXR showing pulmonary vascular redistribution) 0 filed at 11/26/2024 1115   History of cerebrovascular disease (Prior TIA or stroke) 0 filed at 11/26/2024 1115   Pre-operative insulin treatment 0 filed at 11/26/2024 1115   Pre-operative creatinine>2 mg/dl 0 filed at 11/26/2024 1115   Revised Cardiac Risk Calculator 0 filed at 11/26/2024 1115          Apfel Simplified Score      Flowsheet Row Pre-Admission Testing from 11/26/2024 in [facility]   Smoking status 1 filed at 11/26/2024 1115   History of motion sickness or PONV  0 filed at 11/26/2024 1115   Use of postoperative opioids 1 filed at 11/26/2024 1115   Gender - Female 1=Yes filed at 11/26/2024 1115   Apfel Simplified Score Calculator 3 filed at 11/26/2024 1115          Risk Analysis Index Results This Encounter         11/26/2024  1116             Do you live in a place other than your own home?: 0    When did you begin living in the place you are currently residing?: Greater than  one year ago    Any kidney failure, kidney not working well, or seeing a kidney doctor (nephrologist)? If yes, was this for kidney stones or another problem?: 0 No    Any history of chronic (long-term) congestive heart failure (CHF)?: 0 No    Any shortness of breath when resting?: 0 No    In the past five years, have you been diagnosed with or treated for cancer?: No    During the last 3 months has it become difficult for you to remember things or organize your thoughts?: 0 No    Have you lost weight of 10 pounds or more in the past 3 months without trying?: 0 No    Do you have any loss of appetitie?: 0 No    Getting Around (Mobility): 0 Can get around without help    Eatin Can plan and prepare own meals    Toiletin Can use toilet without any help    Personal Hygiene (Bathing, Hand Washing, Changing Clothes): 0 Can shower or bathe without any help    VALDEZ Cancer History: Patient does not indicate history of cancer    Total Risk Analysis Index Score Without Cancer: 18    Total Risk Analysis Index Score: 18          Stop Bang Score      Flowsheet Row Pre-Admission Testing from 2024 in [facility]   Do you snore loudly? 1 filed at 2024 111   Do you often feel tired or fatigued after your sleep? 0 filed at 2024 111   Has anyone ever observed you stop breathing in your sleep? 0 filed at 2024 111   Do you have or are you being treated for high blood pressure? 1 filed at 2024 111   Recent BMI (Calculated) 22.7 filed at 2024 111   Is BMI greater than 35 kg/m2? 0=No filed at 2024 111   Age older than 50 years old? 1=Yes filed at 2024 111   Is your neck circumference greater than 17 inches (Male) or 16 inches (Female)? 0 filed at 2024 111   Gender - Male 0=No filed at 2024 111   STOP-BANG Total Score 3 filed at 2024 111          Prodigy: High Risk  Total Score: 11              Prodigy Age Score      Prodigy Previous Opioid Use  Score           ARISCAT Score for Postoperative Pulmonary Complications      Flowsheet Row Pre-Admission [place] [facility] [place]   Age, years  3 filed at 11/26/2024 1116   Preoperative SpO2 0 filed at 11/26/2024 1116   Respiratory infection in the last month Either upper or lower (i.e., URI, bronchitis, pneumonia), with fever and antibiotic treatment 0 filed at 11/26/2024 1116   Preoperative anemoa (Hgb less than 10 g/dl) 0 filed at 11/26/2024 1116   Surgical incision  15 filed at 11/26/2024 1116   Duration of surgery  16 filed at 11/26/2024 1116   Emergency Procedure  0 filed at 11/26/2024 1116   ARISCAT Total Score  34 filed at 11/26/2024 1116          Brii Perioperative Risk for Myocardial Infarction or Cardiac Arrest (THI)      Flowsheet Row Pre-Admission Testing [place]26/[place]24 [place] [facility] [place]   Age 1.26 filed at 11/26/2024 1116   Functional Status  0 filed at 11/26/2024 1116   ASA Class  -3.29 filed at 11/26/2024 1116   Creatinine 0 filed at 11/26/2024 1116   Type of Procedure  0.21 filed at 11/26/2024 1116   HTI Total Score  -7.07 filed at 11/26/2024 1116   THI % 0.08 filed at 11/26/2024 1116            Assessment and Plan:     Pre-Op    63 yo female scheduled for Left Sided L3-L4, L4-L5 laminotomies foraminotomies on 12/5/2024 with Dr. Friedman. Blood work and MRSA ordered- oral chlorahexidine prescribed. EKG shows NSR with abnormalities in anterior leads, v rate of 71 bpm- comparable EKG on file with slight change in lead V3. Otherwise no further orders indicated.     Cardiac  -Hypertension, compliant with taking lisinopril  -Mitral valve prolapse  -LVEF 65% in 2020  DASI Score: 36.2   MET Score: 7.2  RCRI  0 which is 3.9% 30 day risk of MACE (risk for cardiac death, nonfatal myocardial infarction, and nonfactal cardiac arrest)  THI score which indicates a  0.08% risk of intraoperative or 30-day postoperative MACE    Pulmonary  -Former smoker, exposed to  secondhand smoke while  smokes at home  -Preoperative deep breathing educational handout provided to patient.  STOP BANG:   3  points which is a low risk for moderate to severe CYRIL  ARISCAT:    34  points which is a intermediate (13.3%) risk of in-hospital post-op pulmonary complications     Neuro  -Cervical spine disease  -Lumbar spine disease with radiculopathy, reason for surgery, taking Lyrica  -Follows with pain management    Endocrine  Hypothyroidism, compliant with taking Synthroid    GI  Apfel: 3 points 61% risk for post operative N/V    /Renal  -Counseled on avoiding NSAIDs, adequate hydration    Musculoskeletal   -Osteoarthritis  -Osteopenia    Hematologic  -No hematological medical history, however due to high Caprini score of  5 patient is at HIGH risk for perioperative DVT, informative education handout provided    Psychiatric  -ADHD  -Anxiety  -Depression  -Insomnia  - no current medical management    Skin   -History of basal cell carcinoma, nasal  -Patient was instructed to make surgeon aware of any skin changes or concerns prior to surgery.     Anesthesia:  Patient denies any anesthesia complications.     See risk scores as previously documented.

## 2024-11-26 NOTE — PREPROCEDURE INSTRUCTIONS
Thank you for visiting Preadmission Testing at [facility]. If you have any changes to your health condition, please call the SURGEON's office to alert them and give them details of your symptoms.        Preoperative Brain Exercises    What are brain exercises?  A brain exercise is any activity that engages your thinking (cognitive) skills.    What types of activities are considered brain exercises?  Jigsaw puzzles, crossword puzzles, word jumble, memory games, word search, and many more.  Many can be found free online or on your phone via a mobile flakita.    Why should I do brain exercises before my surgery?  More recent research has shown brain exercise before surgery can lower the risk of postoperative delirium (confusion) which can be especially important for older adults.  Patients who did brain exercises for 5 to 10 hours the days before surgery, cut their risk of postoperative delirium in half up to 1 week after surgery.      Preoperative Deep Breathing Exercises    Why it is important to do deep breathing exercises before my surgery?  Deep breathing exercises strengthen your breathing muscles.  This helps you to recover after your surgery and decreases the chance of breathing complications.    How are the deep breathing exercises done?  Sit straight with your back supported.  Breathe in deeply and slowly through your nose. Your lower rib cage should expand and your abdomen may move forward.  Hold that breath for 3 to 5 seconds.  Breathe out through pursed lips, slowly and completely.  Rest and repeat 10 times every hour while awake.  Rest longer if you become dizzy or lightheaded.      Patient and Family Education   Ways You Can Help Prevent Blood Clots     This handout explains some simple things you can do to help prevent blood clots.      Blood clots are blockages that can form in the body's veins. When a blood clot forms in your deep veins, it may be called a deep vein thrombosis, or DVT for short. Blood clots can  happen in any part of the body where blood flows, but they are most common in the arms and legs. If a piece of a blood clot breaks free and travels to the lungs, it is called a pulmonary embolus (PE). A PE can be a very serious problem.      Being in the hospital or having surgery can raise your chances of getting a blood clot because you may not be well enough to move around as much as you normally do.      Ways you can help prevent blood clots in the hospital         Wearing SCDs. SCDs stands for Sequential Compression Devices.   SCDs are special sleeves that wrap around your legs  They attach to a pump that fills them with air to gently squeeze your legs every few minutes.   This helps return the blood in your legs to your heart.   SCDs should only be taken off when walking or bathing.   SCDs may not be comfortable, but they can help save your life.               Wearing compression stockings - if your doctor orders them. These special snug fitting stockings gently squeeze your legs to help blood flow.       Walking. Walking helps move the blood in your legs.   If your doctor says it is ok, try walking the halls at least   5 times a day. Ask us to help you get up, so you don't fall.      Taking any blood thinning medicines your doctor orders.          ©[facility]; 3/23        Ways you can help prevent blood clots at home       Wearing compression stockings - if your doctor orders them. ? Walking - to help move the blood in your legs.       Taking any blood thinning medicines your doctor orders.      Signs of a blood clot or PE      Tell your doctor or nurse know right away if you have of the problems listed below.    If you are at home, seek medical care right away. Call 911 for chest pain or problems breathing.          Signs of a blood clot (DVT) - such as pain,  swelling, redness or warmth in your arm or leg      Signs of a pulmonary embolism (PE) - such as chest     pain or feeling short of breath

## 2024-11-26 NOTE — H&P (VIEW-ONLY)
CPM/PAT Evaluation       Name: Caitlin Hogan (Caitlin Hogan)  /Age: 1960/64 y.o.     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 Her symptoms are worsening with radiating numbness & tingling down the left thigh. She has been following pain management with previous steroid injections received, last injection was over 6 months ago.  Imaging shows lumbar spine disease.  Treatment options discussed with neurosurgery and subsequently she is scheduled for a lumbar left sided L3-L5 laminotomies. Presents to [facility] today for perioperative risk stratification and optimization. She denies any recent falls with use of assisted devices. Otherwise denies any recent illness, fever/chills, chest pains or shortness of breath.      Past Medical History:   Diagnosis Date    Abdominal distension (gaseous) 2021    Bloating    ADHD (attention deficit hyperactivity disorder)     Allergic     Anxiety     Arthritis     Basal cell carcinoma     NOSE    Cervical disc disorder     Cervicalgia     Depression     Disease of thyroid gland     GERD (gastroesophageal reflux disease)     Hypertension     Hypothyroidism     Insomnia     Low back pain     Lumbar radiculopathy     Mitral valve prolapse     Osteopenia     Personal history of other diseases of the circulatory system 2021    History of abnormal electrocardiography    Personal history of other diseases of the respiratory system 2021    History of acute sinusitis    Personal history of other diseases of urinary system 2021    History of hematuria    Personal history of other endocrine, nutritional and metabolic disease     History of thyroid disorder    Raynaud's syndrome     Unspecified abdominal pain 2021    Chronic abdominal pain       Past Surgical History:   Procedure Laterality Date     SECTION, LOW  TRANSVERSE      HYSTERECTOMY      INJECTION N/A 2024    L4-5 MICHELE    IR INJECTION EPIDURAL STEROID Left 2024    Left L3/4-L4/5 TFESI    OTHER SURGICAL HISTORY  10/23/2019    Bursectomy    OTHER SURGICAL HISTORY  2017    Colonoscopy    OTHER SURGICAL HISTORY  10/23/2019    Colon surgery    OTHER SURGICAL HISTORY  10/23/2019     section    OTHER SURGICAL HISTORY  10/23/2019    Hysterectomy total    OTHER SURGICAL HISTORY  10/01/2021    Epidural steroid injection    OTHER SURGICAL HISTORY  2019    Epidural steroid injection    SMALL INTESTINE SURGERY         Patient  reports being sexually active.    Family History   Problem Relation Name Age of Onset    Hypertension Mother Lexus     Dementia Mother Lexus     Hypertension Father Tristan     COPD Father Tristan     Heart disease Father Tristan     Aneurysm Father Tristan     Hypertension Sister Stefania     Deep vein thrombosis Sister Stefania     Ovarian cancer Sister Stefania     Cancer Brother Elio     Colon cancer Brother Elio     Other (bladder cancer) Brother Elio     Cancer Sister Lexus     Colon cancer Sister Lexus        Allergies   Allergen Reactions    Cephalexin Nausea Only       Prior to Admission medications    Medication Sig Start Date End Date Taking? Authorizing Provider   baclofen (Lioresal) 10 mg tablet Take 1 tablet (10 mg) by mouth 3 times a day. 10/14/24   CASA Ramos-CNP   biotin 5 mg capsule Take 1 capsule (5 mg) by mouth once daily.    Historical Provider, MD   calcium carbonate-vitamin D3 600 mg-5 mcg (200 unit) tablet Take 1 tablet by mouth once daily. 19   Historical Provider, MD   diclofenac sodium (Voltaren) 1 % gel gel Apply 1 Application topically 4 times a day as needed (joint pain). 23  Riley Leon MD   fexofenadine (Allegra) 180 mg tablet Take 1 tablet (180 mg) by mouth once daily as needed.    Historical Provider, MD   HYDROcodone-acetaminophen (Norco) 5-325 mg tablet Take 1 tablet by mouth  3 times a day. 11/19/24   Riley Leon MD   ibandronate (Boniva) 150 mg tablet Take 1 tablet (150 mg) by mouth every 30 (thirty) days. 9/26/24   Riley Leon MD   levothyroxine (Synthroid, Levoxyl) 100 mcg tablet Take 1 tablet (100 mcg) by mouth once daily. 7/8/24   Riley Leon MD   lisinopril 5 mg tablet Take 1 tablet (5 mg) by mouth once daily. 7/8/24   Riley Leon MD   meloxicam (Mobic) 15 mg tablet Take 1 tablet (15 mg) by mouth once daily. 11/7/24   Dain Yoon MD   multivitamin tablet Take 1 tablet by mouth once daily. 12/2/19   Historical Provider, MD   naloxone (Narcan) 4 mg/0.1 mL nasal spray Administer 1 spray (4 mg) into affected nostril(s) if needed. 4/27/20   Historical Provider, MD   pregabalin (Lyrica) 25 mg capsule Take 3 capsules (75 mg) by mouth 2 times a day. 11/11/24 1/10/25  Saadia Feng, APRN-CNP   zolpidem (Ambien) 5 mg tablet Take 1 tablet (5 mg) by mouth as needed at bedtime for sleep. 11/5/24 12/5/24  Dain Yoon MD        PAT ROS   Constitutional: Negative for fever, chills, or sweats   ENMT: Negative for nasal discharge, congestion, ear pain, mouth pain, throat pain   Respiratory: Negative for cough, wheezing, shortness of breath   Cardiac: Negative for chest pain, dyspnea on exertion, palpitations   Gastrointestinal: Negative for nausea, vomiting, diarrhea, constipation, abdominal pain  Genitourinary: Negative for dysuria, flank pain, frequency, hematuria     Musculoskeletal: Positive for decreased ROM, pain, swelling, weakness in lower back with numbness & tingling into left thigh     Neurological: Negative for dizziness, confusion, headache  Psychiatric: Negative for mood changes   Skin: Negative for itching, rash, ulcer    Hematologic/Lymph: Negative for bruising, easy bleeding  Allergic/Immunologic: Negative itching, sneezing, swelling      Physical Exam  Constitutional:       Appearance: Normal appearance.   HENT:      Head: Normocephalic.       Mouth/Throat:      Mouth: Mucous membranes are moist.   Eyes:      Extraocular Movements: Extraocular movements intact.   Cardiovascular:      Rate and Rhythm: Normal rate and regular rhythm.   Pulmonary:      Effort: Pulmonary effort is normal.      Breath sounds: Normal breath sounds.   Abdominal:      General: Abdomen is flat.      Palpations: Abdomen is soft.   Musculoskeletal:      Cervical back: Normal range of motion.      Lumbar back: Decreased range of motion.   Skin:     General: Skin is warm and dry.   Neurological:      General: No focal deficit present.      Mental Status: She is alert.   Psychiatric:         Mood and Affect: Mood normal.        PAT AIRWAY:   Airway:     Mallampati::  II    Neck ROM::  Full  normal      Testing/Diagnostic:   Stress test in 2020  IMPRESSION:  1.  Normal myocardial perfusion study without evidence of ischemia or  prior infarction.  2. The left ventricle is normal in size.  3. Normal LV wall motion with an LV EF estimated at greater than 65%  at both rest and stress    Patient Specialist/PCP:   PCP: Dr. Leon  Pain management: Dr. Rodríguez    Visit Vitals  /65   Pulse 72   Temp 37 °C (98.6 °F) (Temporal)   Resp 14       DASI Risk Score      [place] [facility] [place] you take care of yourself (eat, dress, bathe, or use toilet)?  2.75 filed at 11/26/2024 1114   Can you walk indoors, such as around your house? 1.75 filed at 11/26/2024 1114   Can you walk a block or two on level ground?  2.75 filed at 11/26/2024 1114   Can you climb a flight of stairs or walk up a hill? 5.5 filed at 11/26/2024 1114   Can you run a short distance? 0 filed at 11/26/2024 1114   Can you do light work around the house like dusting or washing dishes? 2.7 filed at 11/26/2024 1114   Can you do moderate work around the house like vacuuming, sweeping floors or carrying groceries? 3.5 filed at 11/26/2024 1114   Can you do heavy work around  the house like scrubbing floors or lifting and moving heavy furniture?  0 filed at 11/26/2024 1114   Can you do yard work like raking leaves, weeding or pushing a mower? 4.5 filed at 11/26/2024 1114   Can you have sexual relations? 5.25 filed at 11/26/2024 1114   Can you participate in moderate recreational activities like golf, bowling, dancing, doubles tennis or throwing a baseball or football? 0 filed at 11/26/2024 1114   Can you participate in strenous sports like swimming, singles tennis, football, basketball, or skiing? 7.5 filed at 11/26/2024 1114   DASI SCORE 36.2 filed at 11/26/2024 1114   METS Score (Will be calculated only when all the questions are answered) 7.2 filed at 11/26/2024 1114          Caprini DVT Assessment      Flowsheet Row Pre-Admission Testing from 11/26/2024 in [facility]   DVT Score 5 filed at 11/26/2024 1114   Surgical Factors Major surgery planned, including arthroscopic and laproscopic (1-2 hours) filed at 11/26/2024 1114   BMI 30 or less filed at 11/26/2024 1114          Modified Frailty Index      Flowsheet Row Pre-Admission Testing from 11/26/2024 in [facility]   Non-independent functional status (problems with dressing, bathing, personal grooming, or cooking) 0 filed at 11/26/2024 1115   History of diabetes mellitus  0 filed at 11/26/2024 1115   History of COPD 0 filed at 11/26/2024 1115   History of CHF No filed at 11/26/2024 1115   History of MI 0 filed at 11/26/2024 1115   History of Percutaneous Coronary Intervention, Cardiac Surgery, or Angina No filed at 11/26/2024 1115   Hypertension requiring the use of medication  0.0909 filed at 11/26/2024 1115   Peripheral vascular disease 0 filed at 11/26/2024 1115   Impaired sensorium (cognitive impairement or loss, clouding, or delirium) 0 filed at 11/26/2024 1115   TIA or CVA withouy residual deficit 0 filed at 11/26/2024 1115   Cerebrovascular accident with deficit 0 filed at 11/26/2024 1119    Modified Frailty Index Calculator .0909 filed at 11/26/2024 1115          CHADS2 Stroke Risk  Current as of today        N/A 3 to 100%: High Risk   2 to < 3%: Medium Risk   0 to < 2%: Low Risk     Last Change: N/A          This score determines the patient's risk of having a stroke if the patient has atrial fibrillation.        This score is not applicable to this patient. Components are not calculated.          Revised Cardiac Risk Index      Flowsheet Row Pre-Admission Testing from 11/26/2024 in [facility]   High-Risk Surgery (Intraperitoneal, Intrathoracic,Suprainguinal vascular) 0 filed at 11/26/2024 1115   History of ischemic heart disease (History of MI, History of positive exercuse test, Current chest paint considered due to myocardial ischemia, Use of nitrate therapy, ECG with pathological Q Waves) 0 filed at 11/26/2024 1115   History of congestive heart failure (pulmonary edemia, bilateral rales or S3 gallop, Paroxysmal nocturnal dyspnea, CXR showing pulmonary vascular redistribution) 0 filed at 11/26/2024 1115   History of cerebrovascular disease (Prior TIA or stroke) 0 filed at 11/26/2024 1115   Pre-operative insulin treatment 0 filed at 11/26/2024 1115   Pre-operative creatinine>2 mg/dl 0 filed at 11/26/2024 1115   Revised Cardiac Risk Calculator 0 filed at 11/26/2024 1115          Apfel Simplified Score      Flowsheet Row Pre-Admission Testing from 11/26/2024 in [facility]   Smoking status 1 filed at 11/26/2024 1115   History of motion sickness or PONV  0 filed at 11/26/2024 1115   Use of postoperative opioids 1 filed at 11/26/2024 1115   Gender - Female 1=Yes filed at 11/26/2024 1115   Apfel Simplified Score Calculator 3 filed at 11/26/2024 1115          Risk Analysis Index Results This Encounter         11/26/2024  1116             Do you live in a place other than your own home?: 0    When did you begin living in the place you are currently residing?: Greater than  one year ago    Any kidney failure, kidney not working well, or seeing a kidney doctor (nephrologist)? If yes, was this for kidney stones or another problem?: 0 No    Any history of chronic (long-term) congestive heart failure (CHF)?: 0 No    Any shortness of breath when resting?: 0 No    In the past five years, have you been diagnosed with or treated for cancer?: No    During the last 3 months has it become difficult for you to remember things or organize your thoughts?: 0 No    Have you lost weight of 10 pounds or more in the past 3 months without trying?: 0 No    Do you have any loss of appetitie?: 0 No    Getting Around (Mobility): 0 Can get around without help    Eatin Can plan and prepare own meals    Toiletin Can use toilet without any help    Personal Hygiene (Bathing, Hand Washing, Changing Clothes): 0 Can shower or bathe without any help    VALDEZ Cancer History: Patient does not indicate history of cancer    Total Risk Analysis Index Score Without Cancer: 18    Total Risk Analysis Index Score: 18          Stop Bang Score      Flowsheet Row Pre-Admission Testing from 2024 in [facility]   Do you snore loudly? 1 filed at 2024 111   Do you often feel tired or fatigued after your sleep? 0 filed at 2024 111   Has anyone ever observed you stop breathing in your sleep? 0 filed at 2024 111   Do you have or are you being treated for high blood pressure? 1 filed at 2024 111   Recent BMI (Calculated) 22.7 filed at 2024 111   Is BMI greater than 35 kg/m2? 0=No filed at 2024 111   Age older than 50 years old? 1=Yes filed at 2024 111   Is your neck circumference greater than 17 inches (Male) or 16 inches (Female)? 0 filed at 2024 111   Gender - Male 0=No filed at 2024 111   STOP-BANG Total Score 3 filed at 2024 111          Prodigy: High Risk  Total Score: 11              Prodigy Age Score      Prodigy Previous Opioid Use  Score           ARISCAT Score for Postoperative Pulmonary Complications      Flowsheet Row Pre-Admission Testing from 11/26/2024 in [facility] [place]   Age, years  3 filed at 11/26/2024 1116   Preoperative SpO2 0 filed at 11/26/2024 1116   Respiratory infection in the last month Either upper or lower (i.e., URI, bronchitis, pneumonia), with fever and antibiotic treatment 0 filed at 11/26/2024 1116   Preoperative anemoa (Hgb less than 10 g/dl) 0 filed at 11/26/2024 1116   Surgical incision  15 filed at 11/26/2024 1116   Duration of surgery  16 filed at 11/26/2024 1116   Emergency Procedure  0 filed at 11/26/2024 1116   ARISCAT Total Score  34 filed at 11/26/2024 1116          Brii Perioperative Risk for Myocardial Infarction or Cardiac Arrest (THI)      Flowsheet Row Pre-Admission Testing from 11/26/2024 in [facility] [place]   Age 1.26 filed at 11/26/2024 1116   Functional Status  0 filed at 11/26/2024 1116   ASA Class  -3.29 filed at 11/26/2024 1116   Creatinine 0 filed at 11/26/2024 1116   Type of Procedure  0.21 filed at 11/26/2024 1116   THI Total Score  -7.07 filed at 11/26/2024 1116   THI % 0.08 filed at 11/26/2024 1116            Assessment and Plan:     Pre-Op    63 yo female scheduled for Left Sided L3-L4, L4-L5 laminotomies foraminotomies on 12/5/2024 with Dr. Friedman. Blood work and MRSA ordered- oral chlorahexidine prescribed. EKG shows NSR with abnormalities in anterior leads, v rate of 71 bpm- comparable EKG on file with slight change in lead V3. Otherwise no further orders indicated.     Cardiac  -Hypertension, compliant with taking lisinopril  -Mitral valve prolapse  -LVEF 65% in 2020  DASI Score: 36.2   MET Score: 7.2  RCRI  0 which is 3.9% 30 day risk of MACE (risk for cardiac death, nonfatal myocardial infarction, and nonfactal cardiac arrest)  THI score which indicates a  0.08% risk of intraoperative or 30-day postoperative MACE    Pulmonary  -Former smoker, exposed to  secondhand smoke while  smokes at home  -Preoperative deep breathing educational handout provided to patient.  STOP BANG:   3  points which is a low risk for moderate to severe CYRIL  ARISCAT:    34  points which is a intermediate (13.3%) risk of in-hospital post-op pulmonary complications     Neuro  -Cervical spine disease  -Lumbar spine disease with radiculopathy, reason for surgery, taking Lyrica  -Follows with pain management    Endocrine  Hypothyroidism, compliant with taking Synthroid    GI  Apfel: 3 points 61% risk for post operative N/V    /Renal  -Counseled on avoiding NSAIDs, adequate hydration    Musculoskeletal   -Osteoarthritis  -Osteopenia    Hematologic  -No hematological medical history, however due to high Caprini score of  5 patient is at HIGH risk for perioperative DVT, informative education handout provided    Psychiatric  -ADHD  -Anxiety  -Depression  -Insomnia  - no current medical management    Skin   -History of basal cell carcinoma, nasal  -Patient was instructed to make surgeon aware of any skin changes or concerns prior to surgery.     Anesthesia:  Patient denies any anesthesia complications.     See risk scores as previously documented.

## 2024-11-26 NOTE — PREPROCEDURE INSTRUCTIONS
Medication List            Accurate as of November 26, 2024 11:35 AM. Always use your most recent med list.                baclofen 10 mg tablet  Commonly known as: Lioresal  Take 1 tablet (10 mg) by mouth 3 times a day.  Medication Adjustments for Surgery: Take/Use as prescribed     biotin 5 mg capsule  Additional Medication Adjustments for Surgery: Take last dose 7 days before surgery  Notes to patient: STOP all NSAIDS (Ibuprofen, Motrin, Aleve), vitamins, herbals, supplements, and all over the counter medications for 7 days prior to surgery    Tylenol is okay to take up until the morning of surgery for pain or headache if needed      calcium carbonate-vitamin D3 600 mg-5 mcg (200 unit) tablet  Additional Medication Adjustments for Surgery: Take last dose 7 days before surgery     chlorhexidine 0.12 % solution  Commonly known as: Peridex  Swish and spit 15 ml for 2 doses, 15mL the night before surgery and 15 ml morning of surgery - swish for 30 seconds -DO NOT SWALLOW, SPIT OUT  Medication Adjustments for Surgery: Take/Use as prescribed     cholecalciferol 25 MCG (1000 UT) capsule  Commonly known as: Vitamin D-3  Additional Medication Adjustments for Surgery: Take last dose 7 days before surgery     diclofenac sodium 1 % gel  Commonly known as: Voltaren  Apply 1 Application topically 4 times a day as needed (joint pain).  Additional Medication Adjustments for Surgery: Take last dose 7 days before surgery     fexofenadine 180 mg tablet  Commonly known as: Allegra  Medication Adjustments for Surgery: Take/Use as prescribed     HYDROcodone-acetaminophen 5-325 mg tablet  Commonly known as: Norco  Take 1 tablet by mouth 3 times a day.  Additional Medication Adjustments for Surgery: Other (Comment)  Notes to patient: May use the morning of surgery if needed     ibandronate 150 mg tablet  Commonly known as: Boniva  Take 1 tablet (150 mg) by mouth every 30 (thirty) days.  Medication Adjustments for Surgery: Do Not take  on the morning of surgery     levothyroxine 100 mcg tablet  Commonly known as: Synthroid, Levoxyl  Take 1 tablet (100 mcg) by mouth once daily.  Medication Adjustments for Surgery: Take/Use as prescribed     lisinopril 5 mg tablet  Take 1 tablet (5 mg) by mouth once daily.  Additional Medication Adjustments for Surgery: Other (Comment)  Notes to patient: HOLD any evening dose the night before the day of surgery  HOLD the day of surgery    Last dose will be 12/4     meloxicam 15 mg tablet  Commonly known as: Mobic  Take 1 tablet (15 mg) by mouth once daily.  Additional Medication Adjustments for Surgery: Take last dose 7 days before surgery     multivitamin tablet  Additional Medication Adjustments for Surgery: Take last dose 7 days before surgery     pregabalin 25 mg capsule  Commonly known as: Lyrica  Take 3 capsules (75 mg) by mouth 2 times a day.  Medication Adjustments for Surgery: Take/Use as prescribed     zolpidem 5 mg tablet  Commonly known as: Ambien  Take 1 tablet (5 mg) by mouth as needed at bedtime for sleep.  Medication Adjustments for Surgery: Do Not take on the morning of surgery                PRE-OPERATIVE INSTRUCTIONS    You will receive notification one business day prior to your procedure to confirm your arrival time. It is important that you answer your phone and/or check your messages during this time. If you do not hear from the surgery center by 5 pm. the day before your procedure, please call [phone].     Please enter the building through the Outpatient entrance and take the elevator off the lobby to the 2nd floor then check in at the Outpatient Surgery desk on the 2nd floor.    INSTRUCTIONS:  Talk to your surgeon for instructions if you should stop your aspirin, blood thinner, or diabetes medicines.  DO NOT take any multivitamins or over the counter supplements for 7-10 days before surgery.  If not being admitted, you must have an adult immediately available to drive you home after  surgery. We also highly recommend you have someone stay with you for the entire day and night of your surgery.  For children having surgery, a parent or legal guardian must accompany them to the surgery center. If this is not possible, please call [phone] to make additional arrangements.  For adults who are unable to consent or make medical decisions for themselves, a legal guardian or Power of  must accompany them to the surgery center. If this is not possible, please call [phone] to make additional arrangements.  Wear comfortable, loose fitting clothing.  All jewelry and piercings must be removed. If you are unable to remove an item or have a dermal piercing, please be sure to tell the nurse when you arrive for surgery.  Nail polish and make-up must be removed.  Avoid smoking or consuming alcohol for 24 hours before surgery.  To help prevent infection, please take a shower/bath and wash your hair the night before and/or morning of surgery (or follow other specific bathing instructions provided).    Preoperative Fasting Guidelines    Why must I stop eating and drinking near surgery time?  With sedation, food or liquid in your stomach can enter your lungs causing serious complications  Increases nausea and vomiting    When do I need to stop eating and drinking before my surgery?  Do not eat any solid food after midnight the night before your surgery/procedure unless otherwise instructed by your surgeon.   You may have up to 13.5 ounces of clear liquid until TWO hours before your instructed arrival time to the hospital.  This includes water, black tea/coffee, (no milk or cream) apple juice, and electrolyte drinks (Gatorade).   You may chew gum until TWO hours before your surgery/procedure      If applicable, notify your surgeons office immediately of any new skin changes that occur to the surgical area.       If you have any questions or concerns, please call Pre-Admission Testing at [phone].

## 2024-11-27 LAB
EST. AVERAGE GLUCOSE BLD GHB EST-MCNC: 108 MG/DL
HBA1C MFR BLD: 5.4 %
PREALB SERPL-MCNC: 26.4 MG/DL (ref 18–40)

## 2024-11-28 LAB — STAPHYLOCOCCUS SPEC CULT: NORMAL

## 2024-11-30 LAB
ATRIAL RATE: 71 BPM
P AXIS: 71 DEGREES
P OFFSET: 202 MS
P ONSET: 148 MS
PR INTERVAL: 142 MS
Q ONSET: 219 MS
QRS COUNT: 12 BEATS
QRS DURATION: 82 MS
QT INTERVAL: 416 MS
QTC CALCULATION(BAZETT): 452 MS
QTC FREDERICIA: 440 MS
R AXIS: 69 DEGREES
T AXIS: 71 DEGREES
T OFFSET: 427 MS
VENTRICULAR RATE: 71 BPM

## 2024-12-03 ENCOUNTER — TELEPHONE (OUTPATIENT)
Dept: PAIN MEDICINE | Facility: CLINIC | Age: 64
End: 2024-12-03
Payer: COMMERCIAL

## 2024-12-03 DIAGNOSIS — G47.00 INSOMNIA, UNSPECIFIED TYPE: ICD-10-CM

## 2024-12-03 DIAGNOSIS — M54.12 CERVICAL RADICULITIS: ICD-10-CM

## 2024-12-03 RX ORDER — BACLOFEN 10 MG/1
10 TABLET ORAL 3 TIMES DAILY
Qty: 90 TABLET | Refills: 2 | Status: SHIPPED | OUTPATIENT
Start: 2024-12-03

## 2024-12-03 RX ORDER — ZOLPIDEM TARTRATE 5 MG/1
5 TABLET ORAL NIGHTLY PRN
Qty: 30 TABLET | Refills: 0 | Status: SHIPPED | OUTPATIENT
Start: 2024-12-03 | End: 2025-01-02

## 2024-12-04 ENCOUNTER — ANESTHESIA EVENT (OUTPATIENT)
Dept: OPERATING ROOM | Facility: HOSPITAL | Age: 64
End: 2024-12-04
Payer: COMMERCIAL

## 2024-12-05 ENCOUNTER — ANESTHESIA (OUTPATIENT)
Dept: OPERATING ROOM | Facility: HOSPITAL | Age: 64
End: 2024-12-05
Payer: COMMERCIAL

## 2024-12-05 ENCOUNTER — APPOINTMENT (OUTPATIENT)
Dept: RADIOLOGY | Facility: HOSPITAL | Age: 64
End: 2024-12-05
Payer: COMMERCIAL

## 2024-12-05 ENCOUNTER — HOSPITAL ENCOUNTER (OUTPATIENT)
Facility: HOSPITAL | Age: 64
Setting detail: OUTPATIENT SURGERY
Discharge: HOME | End: 2024-12-05
Attending: STUDENT IN AN ORGANIZED HEALTH CARE EDUCATION/TRAINING PROGRAM | Admitting: STUDENT IN AN ORGANIZED HEALTH CARE EDUCATION/TRAINING PROGRAM
Payer: COMMERCIAL

## 2024-12-05 VITALS
HEIGHT: 63 IN | TEMPERATURE: 97.2 F | OXYGEN SATURATION: 98 % | DIASTOLIC BLOOD PRESSURE: 75 MMHG | RESPIRATION RATE: 16 BRPM | SYSTOLIC BLOOD PRESSURE: 164 MMHG | BODY MASS INDEX: 22.68 KG/M2 | HEART RATE: 68 BPM | WEIGHT: 128 LBS

## 2024-12-05 DIAGNOSIS — M54.16 LUMBAR RADICULOPATHY: Primary | ICD-10-CM

## 2024-12-05 DIAGNOSIS — M54.16 LUMBAR RADICULOPATHY: ICD-10-CM

## 2024-12-05 PROCEDURE — 3700000001 HC GENERAL ANESTHESIA TIME - INITIAL BASE CHARGE: Performed by: STUDENT IN AN ORGANIZED HEALTH CARE EDUCATION/TRAINING PROGRAM

## 2024-12-05 PROCEDURE — 2500000005 HC RX 250 GENERAL PHARMACY W/O HCPCS: Performed by: STUDENT IN AN ORGANIZED HEALTH CARE EDUCATION/TRAINING PROGRAM

## 2024-12-05 PROCEDURE — 2780000003 HC OR 278 NO HCPCS: Performed by: STUDENT IN AN ORGANIZED HEALTH CARE EDUCATION/TRAINING PROGRAM

## 2024-12-05 PROCEDURE — 7100000009 HC PHASE TWO TIME - INITIAL BASE CHARGE: Performed by: STUDENT IN AN ORGANIZED HEALTH CARE EDUCATION/TRAINING PROGRAM

## 2024-12-05 PROCEDURE — 63035 LAMOT DCMPRN NRV RT EA ADDL: CPT | Performed by: STUDENT IN AN ORGANIZED HEALTH CARE EDUCATION/TRAINING PROGRAM

## 2024-12-05 PROCEDURE — 2500000002 HC RX 250 W HCPCS SELF ADMINISTERED DRUGS (ALT 637 FOR MEDICARE OP, ALT 636 FOR OP/ED): Performed by: STUDENT IN AN ORGANIZED HEALTH CARE EDUCATION/TRAINING PROGRAM

## 2024-12-05 PROCEDURE — A63030 PR LAMNOTMY INCL W/DCMPRSN NRV ROOT 1 INTRSPC LUMBR: Performed by: NURSE ANESTHETIST, CERTIFIED REGISTERED

## 2024-12-05 PROCEDURE — 3700000002 HC GENERAL ANESTHESIA TIME - EACH INCREMENTAL 1 MINUTE: Performed by: STUDENT IN AN ORGANIZED HEALTH CARE EDUCATION/TRAINING PROGRAM

## 2024-12-05 PROCEDURE — 2500000004 HC RX 250 GENERAL PHARMACY W/ HCPCS (ALT 636 FOR OP/ED): Performed by: NEUROLOGICAL SURGERY

## 2024-12-05 PROCEDURE — 2720000007 HC OR 272 NO HCPCS: Performed by: STUDENT IN AN ORGANIZED HEALTH CARE EDUCATION/TRAINING PROGRAM

## 2024-12-05 PROCEDURE — 7100000001 HC RECOVERY ROOM TIME - INITIAL BASE CHARGE: Performed by: STUDENT IN AN ORGANIZED HEALTH CARE EDUCATION/TRAINING PROGRAM

## 2024-12-05 PROCEDURE — 2500000001 HC RX 250 WO HCPCS SELF ADMINISTERED DRUGS (ALT 637 FOR MEDICARE OP): Performed by: STUDENT IN AN ORGANIZED HEALTH CARE EDUCATION/TRAINING PROGRAM

## 2024-12-05 PROCEDURE — 7100000010 HC PHASE TWO TIME - EACH INCREMENTAL 1 MINUTE: Performed by: STUDENT IN AN ORGANIZED HEALTH CARE EDUCATION/TRAINING PROGRAM

## 2024-12-05 PROCEDURE — 7100000002 HC RECOVERY ROOM TIME - EACH INCREMENTAL 1 MINUTE: Performed by: STUDENT IN AN ORGANIZED HEALTH CARE EDUCATION/TRAINING PROGRAM

## 2024-12-05 PROCEDURE — A63030 PR LAMNOTMY INCL W/DCMPRSN NRV ROOT 1 INTRSPC LUMBR: Performed by: ANESTHESIOLOGY

## 2024-12-05 PROCEDURE — 3600000004 HC OR TIME - INITIAL BASE CHARGE - PROCEDURE LEVEL FOUR: Performed by: STUDENT IN AN ORGANIZED HEALTH CARE EDUCATION/TRAINING PROGRAM

## 2024-12-05 PROCEDURE — 3600000009 HC OR TIME - EACH INCREMENTAL 1 MINUTE - PROCEDURE LEVEL FOUR: Performed by: STUDENT IN AN ORGANIZED HEALTH CARE EDUCATION/TRAINING PROGRAM

## 2024-12-05 PROCEDURE — 2500000004 HC RX 250 GENERAL PHARMACY W/ HCPCS (ALT 636 FOR OP/ED): Performed by: NURSE ANESTHETIST, CERTIFIED REGISTERED

## 2024-12-05 PROCEDURE — 63030 LAMOT DCMPRN NRV RT 1 LMBR: CPT | Performed by: STUDENT IN AN ORGANIZED HEALTH CARE EDUCATION/TRAINING PROGRAM

## 2024-12-05 PROCEDURE — 2500000004 HC RX 250 GENERAL PHARMACY W/ HCPCS (ALT 636 FOR OP/ED): Performed by: ANESTHESIOLOGY

## 2024-12-05 RX ORDER — ROCURONIUM BROMIDE 10 MG/ML
INJECTION, SOLUTION INTRAVENOUS AS NEEDED
Status: DISCONTINUED | OUTPATIENT
Start: 2024-12-05 | End: 2024-12-05

## 2024-12-05 RX ORDER — OXYCODONE HYDROCHLORIDE 5 MG/1
5 TABLET ORAL EVERY 6 HOURS PRN
Qty: 28 TABLET | Refills: 0 | Status: SHIPPED | OUTPATIENT
Start: 2024-12-05 | End: 2024-12-12

## 2024-12-05 RX ORDER — AMOXICILLIN 250 MG
1 CAPSULE ORAL DAILY
Qty: 7 TABLET | Refills: 0 | Status: SHIPPED | OUTPATIENT
Start: 2024-12-05 | End: 2024-12-12

## 2024-12-05 RX ORDER — PROPOFOL 10 MG/ML
INJECTION, EMULSION INTRAVENOUS AS NEEDED
Status: DISCONTINUED | OUTPATIENT
Start: 2024-12-05 | End: 2024-12-05

## 2024-12-05 RX ORDER — OXYCODONE HYDROCHLORIDE 5 MG/1
5 TABLET ORAL EVERY 6 HOURS PRN
Qty: 28 TABLET | Refills: 0 | Status: SHIPPED | OUTPATIENT
Start: 2024-12-05 | End: 2024-12-05 | Stop reason: SDUPTHER

## 2024-12-05 RX ORDER — CEFAZOLIN SODIUM 2 G/100ML
INJECTION, SOLUTION INTRAVENOUS AS NEEDED
Status: DISCONTINUED | OUTPATIENT
Start: 2024-12-05 | End: 2024-12-05

## 2024-12-05 RX ORDER — CELECOXIB 200 MG/1
400 CAPSULE ORAL ONCE
Status: COMPLETED | OUTPATIENT
Start: 2024-12-05 | End: 2024-12-05

## 2024-12-05 RX ORDER — ONDANSETRON HYDROCHLORIDE 2 MG/ML
INJECTION, SOLUTION INTRAVENOUS AS NEEDED
Status: DISCONTINUED | OUTPATIENT
Start: 2024-12-05 | End: 2024-12-05

## 2024-12-05 RX ORDER — ACETAMINOPHEN 325 MG/1
975 TABLET ORAL ONCE
Status: COMPLETED | OUTPATIENT
Start: 2024-12-05 | End: 2024-12-05

## 2024-12-05 RX ORDER — ALBUTEROL SULFATE 0.83 MG/ML
2.5 SOLUTION RESPIRATORY (INHALATION) ONCE AS NEEDED
Status: DISCONTINUED | OUTPATIENT
Start: 2024-12-05 | End: 2024-12-05 | Stop reason: HOSPADM

## 2024-12-05 RX ORDER — SODIUM CHLORIDE, SODIUM LACTATE, POTASSIUM CHLORIDE, CALCIUM CHLORIDE 600; 310; 30; 20 MG/100ML; MG/100ML; MG/100ML; MG/100ML
100 INJECTION, SOLUTION INTRAVENOUS CONTINUOUS
Status: DISCONTINUED | OUTPATIENT
Start: 2024-12-05 | End: 2024-12-05 | Stop reason: HOSPADM

## 2024-12-05 RX ORDER — ACETAMINOPHEN 325 MG/1
975 TABLET ORAL ONCE
Status: DISCONTINUED | OUTPATIENT
Start: 2024-12-05 | End: 2024-12-05 | Stop reason: HOSPADM

## 2024-12-05 RX ORDER — OXYCODONE HYDROCHLORIDE 5 MG/1
5 TABLET ORAL EVERY 4 HOURS PRN
Status: DISCONTINUED | OUTPATIENT
Start: 2024-12-05 | End: 2024-12-05 | Stop reason: HOSPADM

## 2024-12-05 RX ORDER — SODIUM CHLORIDE 0.9 G/100ML
IRRIGANT IRRIGATION AS NEEDED
Status: DISCONTINUED | OUTPATIENT
Start: 2024-12-05 | End: 2024-12-05 | Stop reason: HOSPADM

## 2024-12-05 RX ORDER — BUPIVACAINE HYDROCHLORIDE 5 MG/ML
INJECTION, SOLUTION PERINEURAL AS NEEDED
Status: DISCONTINUED | OUTPATIENT
Start: 2024-12-05 | End: 2024-12-05 | Stop reason: HOSPADM

## 2024-12-05 RX ORDER — TRANEXAMIC ACID 650 MG/1
1300 TABLET ORAL ONCE
Status: COMPLETED | OUTPATIENT
Start: 2024-12-05 | End: 2024-12-05

## 2024-12-05 RX ORDER — DIPHENHYDRAMINE HYDROCHLORIDE 50 MG/ML
12.5 INJECTION INTRAMUSCULAR; INTRAVENOUS ONCE AS NEEDED
Status: DISCONTINUED | OUTPATIENT
Start: 2024-12-05 | End: 2024-12-05 | Stop reason: HOSPADM

## 2024-12-05 RX ORDER — FENTANYL CITRATE 50 UG/ML
INJECTION, SOLUTION INTRAMUSCULAR; INTRAVENOUS AS NEEDED
Status: DISCONTINUED | OUTPATIENT
Start: 2024-12-05 | End: 2024-12-05

## 2024-12-05 RX ORDER — ONDANSETRON HYDROCHLORIDE 2 MG/ML
4 INJECTION, SOLUTION INTRAVENOUS ONCE AS NEEDED
Status: DISCONTINUED | OUTPATIENT
Start: 2024-12-05 | End: 2024-12-05 | Stop reason: HOSPADM

## 2024-12-05 RX ORDER — OXYCODONE AND ACETAMINOPHEN 5; 325 MG/1; MG/1
1 TABLET ORAL EVERY 4 HOURS PRN
Status: DISCONTINUED | OUTPATIENT
Start: 2024-12-05 | End: 2024-12-05 | Stop reason: HOSPADM

## 2024-12-05 RX ORDER — LIDOCAINE HYDROCHLORIDE 10 MG/ML
0.1 INJECTION, SOLUTION INFILTRATION; PERINEURAL ONCE
Status: DISCONTINUED | OUTPATIENT
Start: 2024-12-05 | End: 2024-12-05 | Stop reason: HOSPADM

## 2024-12-05 RX ORDER — HYDRALAZINE HYDROCHLORIDE 20 MG/ML
5 INJECTION INTRAMUSCULAR; INTRAVENOUS EVERY 30 MIN PRN
Status: DISCONTINUED | OUTPATIENT
Start: 2024-12-05 | End: 2024-12-05 | Stop reason: HOSPADM

## 2024-12-05 RX ORDER — LIDOCAINE HYDROCHLORIDE 20 MG/ML
INJECTION, SOLUTION EPIDURAL; INFILTRATION; INTRACAUDAL; PERINEURAL AS NEEDED
Status: DISCONTINUED | OUTPATIENT
Start: 2024-12-05 | End: 2024-12-05

## 2024-12-05 RX ORDER — LIDOCAINE HYDROCHLORIDE AND EPINEPHRINE 10; 10 UG/ML; MG/ML
INJECTION, SOLUTION INFILTRATION; PERINEURAL AS NEEDED
Status: DISCONTINUED | OUTPATIENT
Start: 2024-12-05 | End: 2024-12-05 | Stop reason: HOSPADM

## 2024-12-05 RX ORDER — PHENYLEPHRINE 10 MG/250 ML(40 MCG/ML)IN 0.9 % SOD.CHLORIDE INTRAVENOUS
CONTINUOUS PRN
Status: DISCONTINUED | OUTPATIENT
Start: 2024-12-05 | End: 2024-12-05

## 2024-12-05 RX ORDER — LABETALOL HYDROCHLORIDE 5 MG/ML
5 INJECTION, SOLUTION INTRAVENOUS ONCE AS NEEDED
Status: DISCONTINUED | OUTPATIENT
Start: 2024-12-05 | End: 2024-12-05 | Stop reason: HOSPADM

## 2024-12-05 RX ORDER — HYDROMORPHONE HYDROCHLORIDE 0.2 MG/ML
0.2 INJECTION INTRAMUSCULAR; INTRAVENOUS; SUBCUTANEOUS EVERY 5 MIN PRN
Status: DISCONTINUED | OUTPATIENT
Start: 2024-12-05 | End: 2024-12-05 | Stop reason: HOSPADM

## 2024-12-05 RX ORDER — MIDAZOLAM HYDROCHLORIDE 1 MG/ML
INJECTION, SOLUTION INTRAMUSCULAR; INTRAVENOUS AS NEEDED
Status: DISCONTINUED | OUTPATIENT
Start: 2024-12-05 | End: 2024-12-05

## 2024-12-05 RX ORDER — WATER 1 ML/ML
IRRIGANT IRRIGATION AS NEEDED
Status: DISCONTINUED | OUTPATIENT
Start: 2024-12-05 | End: 2024-12-05 | Stop reason: HOSPADM

## 2024-12-05 SDOH — HEALTH STABILITY: MENTAL HEALTH: CURRENT SMOKER: 0

## 2024-12-05 ASSESSMENT — PAIN - FUNCTIONAL ASSESSMENT
PAIN_FUNCTIONAL_ASSESSMENT: 0-10

## 2024-12-05 ASSESSMENT — PAIN SCALES - GENERAL
PAINLEVEL_OUTOF10: 4
PAINLEVEL_OUTOF10: 4
PAINLEVEL_OUTOF10: 7
PAINLEVEL_OUTOF10: 7
PAINLEVEL_OUTOF10: 3
PAINLEVEL_OUTOF10: 3
PAINLEVEL_OUTOF10: 0 - NO PAIN

## 2024-12-05 ASSESSMENT — COLUMBIA-SUICIDE SEVERITY RATING SCALE - C-SSRS
2. HAVE YOU ACTUALLY HAD ANY THOUGHTS OF KILLING YOURSELF?: NO
6. HAVE YOU EVER DONE ANYTHING, STARTED TO DO ANYTHING, OR PREPARED TO DO ANYTHING TO END YOUR LIFE?: NO
1. IN THE PAST MONTH, HAVE YOU WISHED YOU WERE DEAD OR WISHED YOU COULD GO TO SLEEP AND NOT WAKE UP?: NO

## 2024-12-05 NOTE — OP NOTE
Left Sided L3-L4, L4-L5 laminotomies foraminotomie Operative Note     Date: 2024  OR Location: [facility] OR    Name: Caitlin Hogan, : 1960, Age: 64 y.o., MRN: [id number], Sex: female    Diagnosis  Pre-op Diagnosis      * Lumbar radiculopathy [M54.16] Post-op Diagnosis     * Lumbar radiculopathy [M54.16]     Procedures  Left Sided L3-L4, L4-L5 laminotomies foraminotomie  91798 - SC LAMNOTMY INCL W/DCMPRSN NRV ROOT 1 INTRSPC LUMBR    SC LAMNOTMY W/DCMPRSN NRV EACH ADDL CRVCL/LMBR [57689]  Surgeons      * Graeme Friedman - Primary    Resident/Fellow/Other Assistant:  Surgeons and Role:     * Mo Tran MD - Resident - Assisting    Staff:   Surgical Assistant:   Circulator: Nicole Brownub Person: Fanny Cardoso Person: Radha    Anesthesia Staff: Anesthesiologist: Landry Gonzales MD  CRNA: CASA Rodriguez-CRNA    Procedure Summary  Anesthesia: Anesthesia type not filed in the log.  ASA: ASA status not filed in the log.  Estimated Blood Loss: 50 mL  Intra-op Medications:   Administrations occurring from 1219 to 1544 on 24:   Medication Name Total Dose   BUPivacaine HCl (Marcaine) 0.5 % (5 mg/mL) injection 10 mL   sodium chloride 0.9 % irrigation solution 1,000 mL   sterile water irrigation solution 1,000 mL   LR bolus Cannot be calculated   ondansetron (Zofran) 2 mg/mL injection 4 mg   phenylephrine (Dontae-Synephrine) 10 mg in sodium chloride 0.9% 250 mL (0.04 mg/mL) infusion (premix) 0.13 mg   sugammadex (Bridion) 200 mg/2 mL injection 200 mg              Anesthesia Record               Intraprocedure I/O Totals          Intake    LR bolus 1000.00 mL    Phenylephrine Drip 0.00 mL    The total shown is the total volume documented since Anesthesia Start was filed.    Total Intake 1000 mL       Output    Urine 0 mL    Est. Blood Loss 50 mL    Total Output 50 mL       Net    Net Volume 950 mL          Findings: significant foraminal/lateral recess stenosis    Indications: Caitlin Hogan is an 64 y.o.  female who is having surgery for Lumbar radiculopathy [M54.16].     The patient was seen in the preoperative area. The risks, benefits, complications, treatment options, non-operative alternatives, expected recovery and outcomes were discussed with the patient. The possibilities of reaction to medication, pulmonary aspiration, injury to surrounding structures, bleeding, recurrent infection, the need for additional procedures, failure to diagnose a condition, and creating a complication requiring transfusion or operation were discussed with the patient. The patient concurred with the proposed plan, giving informed consent.  The site of surgery was properly noted/marked if necessary per policy. The patient has been actively warmed in preoperative area. Preoperative antibiotics have been ordered and given within 1 hours of incision. Venous thrombosis prophylaxis have been ordered including bilateral sequential compression devices    DESCRIPTION OF THE OPERATION:   The patient was brought to the operating room theater. A verbal huddle was performed confirming the patient by name, date of birth, medical record number, site of surgery. After all team members were in agreement, they underwent anesthesia induction without complication. Two large bore IVs were placed as well as endotracheal tube. Perioperative antibiotic administration was confirmed. The patient was flipped prone onto a kerry table with a isidro frame and their back was prepped and draped in a sterile fashion. Using lateral fluoroscopy we confirmed our levels of interest with a spinal needle and an incision was marked out in the midline    The skin was infiltrated with lidocaine and we then began the procedure by opening the skin with a 10 blade and using combination of Bovie electrocautery and blunt dissection we exposed the spinous process, lamina, and joints at our levels of interest. We then used lateral fluoroscopy again to confirm our levels of  interest (L3/4 and L4/5) after total exposure and a self-retaining retractor was placed into the incision.     Next we turned our attention to the decompression one level at a time. To begin a high speed drill was used to remove the trailing edge of the lamina and exposed the underlying ligamentum flavum. After removal of the bone we used kerrison rongeurs, curettes, and blunt dissection to remove the ligamentum flavum and medial facet. At L4/5, cystic material was noted and removed without issue. After removal of all of the soft tissue and bone and disc we confirmed adequate decompression with a tanja and then achieved hemostasis with a bipolar and floseal.    Next we copiously irrigated the incision and began our closure, the muscle and fascia were approximated with 0 vicryl and 2-0 vicryl sutures and 3-0 stratafix was ran in a subcuticular fashion with mesh glue on the skin. The patient returned to anesthesias care and was extubated and returned to the PACU in stable condition.     Complications:  None; patient tolerated the procedure well.    Disposition: PACU - hemodynamically stable.  Condition: stable       Additional Details: none    Attending Attestation:     Graeme Friedman  Phone Number: [phone]

## 2024-12-05 NOTE — DISCHARGE SUMMARY
Discharge Diagnosis  Lumbar radiculopathy    Issues Requiring Follow-Up  Incisional follow up    Test Results Pending At Discharge  Pending Labs       No current pending labs.            Hospital Course   Patient presented with LLE radiculopathy, found to have lateral recess and foraminal stenosis at L3/4, and L4/5 on the left. 12/5 s/p L3/4, L4/5 laminoforaminotomies. Patient did well postoperatively and was discharged home in good condition    Pertinent Physical Exam At Time of Discharge  Ox3  BUE D/B/T/HG/IO 5  BLE HF/KE/DF/PF/EHL 5  SILT b/l  Incision c/d/I with glue      Home Medications     Medication List      START taking these medications     oxyCODONE 5 mg immediate release tablet; Commonly known as: Roxicodone;   Take 1 tablet (5 mg) by mouth every 6 hours if needed for severe pain (7 -   10) (for postoperative pain, g89.18) for up to 7 days.   sennosides-docusate sodium 8.6-50 mg tablet; Commonly known as:   Petra-Colace; Take 1 tablet by mouth once daily for 7 days.     CONTINUE taking these medications     baclofen 10 mg tablet; Commonly known as: Lioresal; Take 1 tablet (10   mg) by mouth 3 times a day.   biotin 5 mg capsule   calcium carbonate-vitamin D3 600 mg-5 mcg (200 unit) tablet   cholecalciferol 25 MCG (1000 UT) capsule; Commonly known as: Vitamin D-3   ibandronate 150 mg tablet; Commonly known as: Boniva; Take 1 tablet (150   mg) by mouth every 30 (thirty) days.   levothyroxine 100 mcg tablet; Commonly known as: Synthroid, Levoxyl;   Take 1 tablet (100 mcg) by mouth once daily.   lisinopril 5 mg tablet; Take 1 tablet (5 mg) by mouth once daily.   meloxicam 15 mg tablet; Commonly known as: Mobic; Take 1 tablet (15 mg)   by mouth once daily.   multivitamin tablet   pregabalin 25 mg capsule; Commonly known as: Lyrica; Take 3 capsules (75   mg) by mouth 2 times a day.   zolpidem 5 mg tablet; Commonly known as: Ambien; Take 1 tablet (5 mg) by   mouth as needed at bedtime for sleep.     STOP taking  these medications     chlorhexidine 0.12 % solution; Commonly known as: Peridex   diclofenac sodium 1 % gel; Commonly known as: Voltaren   fexofenadine 180 mg tablet; Commonly known as: Allegra   HYDROcodone-acetaminophen 5-325 mg tablet; Commonly known as: Norco       Outpatient Follow-Up  Future Appointments   Date Time Provider Department Center   12/20/2024 11:00 AM Riley Leon MD [id number] [facility]   12/23/2024 12:30 PM Raoul Rock PA-C [id number] [place]   2/17/2025  1:40 PM Graeme Friedman MD [id number] None       Mo Tran MD

## 2024-12-05 NOTE — ANESTHESIA POSTPROCEDURE EVALUATION
Patient: Caitlin Hogan    Procedure Summary       Date: 12/05/24 Room / Location: [facility] OR 09 / Virtual [facility] OR    Anesthesia Start: 1108 Anesthesia Stop: 1307    Procedure: Left Sided L3-L4, L4-L5 laminotomies foraminotomie Diagnosis:       Lumbar radiculopathy      (Lumbar radiculopathy [M54.16])    Surgeons: Graeme Friedman MD Responsible Provider: Landry Gonzales MD    Anesthesia Type: general ASA Status: Not recorded            Anesthesia Type: general    Vitals Value Taken Time   /66 12/05/24 1315   Temp 36.2 °C (97.2 °F) 12/05/24 1306   Pulse 75 12/05/24 1317   Resp 18 12/05/24 1317   SpO2 94 % 12/05/24 1317   Vitals shown include unfiled device data.    Anesthesia Post Evaluation    Patient location during evaluation: PACU  Patient participation: complete - patient participated  Level of consciousness: awake and alert  Pain management: satisfactory to patient  Airway patency: patent  Two or more strategies used to mitigate risk of obstructive sleep apnea  Cardiovascular status: acceptable  Respiratory status: acceptable  Hydration status: acceptable  Postoperative Nausea and Vomiting: none        No notable events documented.    
no

## 2024-12-05 NOTE — ANESTHESIA PROCEDURE NOTES
Airway  Date/Time: 12/5/2024 11:16 AM  Urgency: elective    Airway not difficult    Staffing  Performed: CRNA   Authorized by: Landry Gonzales MD    Performed by: CASA Rodriguez-REDD  Patient location during procedure: OR    Indications and Patient Condition  Indications for airway management: anesthesia and airway protection  Spontaneous ventilation: present  Sedation level: deep  Preoxygenated: yes  Patient position: sniffing  MILS maintained throughout  Mask difficulty assessment: 1 - vent by mask  No planned trial extubation    Final Airway Details  Final airway type: endotracheal airway      Successful airway: ETT  Cuffed: yes   Successful intubation technique: video laryngoscopy  Facilitating devices/methods: intubating stylet  Endotracheal tube insertion site: oral  Blade: Tracy  Blade size: #4  ETT size (mm): 7.0  Cormack-Lehane Classification: grade I - full view of glottis  Placement verified by: chest auscultation and capnometry   Measured from: lips  Number of attempts at approach: 1  Ventilation between attempts: none  Number of other approaches attempted: 0

## 2024-12-05 NOTE — ANESTHESIA PREPROCEDURE EVALUATION
Patient: Caitlin Hogan    Procedure Information       Anesthesia Start Date/Time: 12/05/24 1108    Procedure: Left Sided L3-L4, L4-L5 laminotomies foraminotomie - 30525    Location: [facility] OR 09 / [facility] OR    Surgeons: Graeme Friedman MD            Relevant Problems   Cardiac   (+) Benign hypertension      Neuro   (+) Anxiety   (+) Cervical radiculitis   (+) Depression, major, single episode, moderate (Multi)   (+) Lumbar radiculopathy      GI   (+) GERD (gastroesophageal reflux disease)   (+) Irritable bowel syndrome with diarrhea      Endocrine   (+) Hypothyroidism      Musculoskeletal   (+) Cervical spondylosis without myelopathy   (+) Displacement of lumbar intervertebral disc without myelopathy   (+) Fibromyalgia   (+) Generalized osteoarthritis of multiple sites   (+) Lumbar spondylosis      Skin   (+) Basal cell carcinoma       Clinical information reviewed:   Tobacco  Allergies  Meds   Med Hx  Surg Hx  OB Status  Fam Hx  Soc   Hx        NPO Detail:  NPO/Void Status  Date of Last Liquid: 12/05/24  Time of Last Liquid: 0700  Date of Last Solid: 12/04/24  Time of Last Solid: 2000  Last Intake Type: Clear fluids  Time of Last Void: 0910         Physical Exam    Airway  Mallampati: II  TM distance: >3 FB  Neck ROM: full     Cardiovascular   Rhythm: regular  Rate: normal     Dental - normal exam     Pulmonary   Breath sounds clear to auscultation     Abdominal            Anesthesia Plan    History of general anesthesia?: yes  History of complications of general anesthesia?: no    ASA 2     general     The patient is not a current smoker.    intravenous induction   Postoperative administration of opioids is intended.  Anesthetic plan and risks discussed with patient.    Plan discussed with CRNA.

## 2024-12-20 ENCOUNTER — APPOINTMENT (OUTPATIENT)
Dept: PRIMARY CARE | Facility: CLINIC | Age: 64
End: 2024-12-20
Payer: COMMERCIAL

## 2024-12-20 VITALS
SYSTOLIC BLOOD PRESSURE: 130 MMHG | DIASTOLIC BLOOD PRESSURE: 72 MMHG | HEART RATE: 81 BPM | BODY MASS INDEX: 23.91 KG/M2 | OXYGEN SATURATION: 98 % | WEIGHT: 135 LBS

## 2024-12-20 DIAGNOSIS — M47.812 CERVICAL SPONDYLOSIS WITHOUT MYELOPATHY: ICD-10-CM

## 2024-12-20 DIAGNOSIS — E03.9 ACQUIRED HYPOTHYROIDISM: ICD-10-CM

## 2024-12-20 DIAGNOSIS — M15.9 GENERALIZED OSTEOARTHRITIS OF MULTIPLE SITES: ICD-10-CM

## 2024-12-20 DIAGNOSIS — J30.9 CHRONIC ALLERGIC RHINITIS: ICD-10-CM

## 2024-12-20 DIAGNOSIS — F11.90 OPIATE USE: ICD-10-CM

## 2024-12-20 DIAGNOSIS — M48.062 NEUROGENIC CLAUDICATION DUE TO LUMBAR SPINAL STENOSIS: ICD-10-CM

## 2024-12-20 DIAGNOSIS — Z78.0 MENOPAUSE: ICD-10-CM

## 2024-12-20 DIAGNOSIS — M51.26 DISPLACEMENT OF LUMBAR INTERVERTEBRAL DISC WITHOUT MYELOPATHY: ICD-10-CM

## 2024-12-20 DIAGNOSIS — M79.7 FIBROMYALGIA: ICD-10-CM

## 2024-12-20 DIAGNOSIS — Z12.31 SCREENING MAMMOGRAM, ENCOUNTER FOR: Primary | ICD-10-CM

## 2024-12-20 DIAGNOSIS — F41.9 ANXIETY: ICD-10-CM

## 2024-12-20 DIAGNOSIS — K90.9 STEATORRHEA (HHS-HCC): ICD-10-CM

## 2024-12-20 DIAGNOSIS — F32.1 DEPRESSION, MAJOR, SINGLE EPISODE, MODERATE (MULTI): ICD-10-CM

## 2024-12-20 DIAGNOSIS — C44.311 BASAL CELL CARCINOMA (BCC) OF SKIN OF NOSE: ICD-10-CM

## 2024-12-20 DIAGNOSIS — M54.2 CERVICALGIA: ICD-10-CM

## 2024-12-20 DIAGNOSIS — K58.0 IRRITABLE BOWEL SYNDROME WITH DIARRHEA: ICD-10-CM

## 2024-12-20 DIAGNOSIS — F51.04 PSYCHOPHYSIOLOGICAL INSOMNIA: ICD-10-CM

## 2024-12-20 DIAGNOSIS — I10 BENIGN HYPERTENSION: ICD-10-CM

## 2024-12-20 PROCEDURE — 1036F TOBACCO NON-USER: CPT | Performed by: FAMILY MEDICINE

## 2024-12-20 PROCEDURE — 99214 OFFICE O/P EST MOD 30 MIN: CPT | Performed by: FAMILY MEDICINE

## 2024-12-20 PROCEDURE — 3075F SYST BP GE 130 - 139MM HG: CPT | Performed by: FAMILY MEDICINE

## 2024-12-20 PROCEDURE — 3078F DIAST BP <80 MM HG: CPT | Performed by: FAMILY MEDICINE

## 2024-12-20 RX ORDER — HYDROCODONE BITARTRATE AND ACETAMINOPHEN 5; 325 MG/1; MG/1
1 TABLET ORAL 3 TIMES DAILY
Qty: 90 TABLET | Refills: 0 | Status: SHIPPED | OUTPATIENT
Start: 2024-12-20

## 2024-12-20 RX ORDER — NALOXONE HYDROCHLORIDE 4 MG/.1ML
1 SPRAY NASAL AS NEEDED
Qty: 2 EACH | Refills: 0 | Status: SHIPPED | OUTPATIENT
Start: 2024-12-20

## 2024-12-20 NOTE — PROGRESS NOTES
Subjective   Patient ID: Caitlin Hogan is a 64 y.o. female who presents for Med Management.    HPI   IBS/ Steatorrhea - Celiac panel negative. Bowels are still messed up in that she has loose stools in the AM up to 5 per day. Cramps. Coffee will cause this. CT was normal except for the diverticulosis. Scope in 2017. No blood. Cologuard was negative. Getting fiber. Bloated and cramping lower abdomen. Aggravated by Zithromax for the URI. She saw Dr Gonsales and has started Papaya. Did not help. But doing better now. Rifaximin for a couple weeks did help but still comes and goes. She was on Protonix per Dr Lorenz. Helped the stomach feel some better. Did have lots  of stress with brother dying and being executor. That has been resolved.  A sister  and another brother has cancer. Caring for her father who was in the hospital for 6 days.  She had normal colonoscopy in 2023.  Ice cream and milk clearly aggravates. Has not tried Gluten limiting but has tried limiting milk.  Can try low FODMPS.     Anxiety and insomnia has been some better since she retired and can feel her body changing inside. Still stress with  LBP below and kids overseas with .  Has not seen one sone in five years.  Still daily zolpidem.  She was not able to get it once for 2 weeks and when off was able to sleep. She can try off of it more. But has helped along with Melatonin. Helps to shut down. Still wakes in the night. Several family members with cancer in the last 5 years of bladder, colon and ovary so has concerns for herself. All were around 60 years old. Several with colon cancer. She is less tired without the work and stress of work.  Was very loud at work and does not felt she was  getting support with difficult kids. She retired in 2023.   Was on Wellbutrin but did not help. Feels she has been losing weight. Was down 9 pound over a year but now most of that back .   Feels stress this contributes to the neck  pain. She does snore.  Has been to ENT. Checked for sleep apnea in past. Does not want testing at present.     Basal cell carcinoma of nose - no skin lesions. Dr Harris booth a couple. Annual visits. Had a lesion biopsied and benign on face a few months ago. Chemo cream for spots on chest and face.     Cervicalgia - she was back at work and worse. She is off work now and feels she would not be able to work.  Also with traveling gets worse. Had injections multiple times. Painful at end of day and headaches. Has had therapy which has not been really helpful. Does do aquatics. Bike riding aggravates. Pain to shoulder on left. MRI in 2017 showed moderate C5 foramen stenosis. Otherwise mild stenosis at multiple levels.  Has been to Dr White for injections several years ago. And then to Dr Doe and had injection in 2019. She was  to have a series of injections based on MRI in 2021 showing again multilevel cervical spondylosis and stenosis with Dr Cano   A recent MRI on 2/14/23 did not show anything more than moderate stenosis at C5.  Did one injection and ablation. They have started to discuss  surgery.  Pain level 3-9 and drops 2-5 for 4-5 hours. This is more in low back.  Dr Rodríguez did increase Lyrica as he feels she has fibromyalgia.  Norco and Lyrica allow her to walk better and do house and lawn and garden and to stand and sit longer.  With the LBP below she is not able to function with some days even with meds. Would like to ride a motorcycle,  gardening, ride longer distances, kayak and gardening. Has lost muscle mass and concerned.      Chronic rhinitis - running nose when outside is cold or hot and with pollen in summer. Allegra helps when taken as needed.  Has been to ENT      Depressive disorder - worries as above. No meds. Better since not working.  Feels she need to see a counselor and will consider when less active. Feels she is down as she is not doing things with being retired. Just discouraged with the  pain     Family history of multiple cancers in sibling. So she had genetics eval with Dr Munoz and no inherited disorder.      Generalized osteoarthrosis, involving multiple sites -Knee feels like the patella scrapes at times. Pain in the right hip over greater trochanter. Has had the bursa removed. Did help but now pain again and not sure if the bursa recurrence or from the back. All positional and activity related. Not every day. Was with Dr Tenorio. She did retire in 2023      Hypothyroidism NOS - Good TSH in November 2024  on current dose      Low back pain worse now that she is active around the house.  Worse after mowing.  Pain down the left leg. She says she would like to walk 2 miles. Has been over a year. Constant pain.  Did see Chelsea in Dr Cano's office. MRI showed herniated discs. Referred to Dr Graeme Friedman who did an injection with no relief.  Did have MRI June 20.  Saw Dr Rodríguez who did send to therapy in order to get that MRI that shows foraminal stenosis and recommending that she have a laminectomy.  She did have the back surgery with laminectomy and foraminotomy .  That was on 12/5.  So far she is not noting any less pain and noting some new pains.  Norco tid for pain helps some - see above.  No numbness or weakness. Mid back hurts after standing. Now cannot sit in harder. Would like to get off of medication. Feels the pain in the neck is more manageable.      Lump or mass in breast - good Mammogram in January. Also Pap last fall.      Menopause - no hot flashes.      MVP (mitral valve prolapse) - Fatigue in the chest at times better with BP control. Not pain. Short of breath at times. Flutters at times with anxiety.  No edema. All better since retired      Osteopenia - on DEXA 9/29/22. She is on Vitamin D and Calcium. Not able to get Prolia. FRAX is 15/6%. Ibandronate now. Will check.      Pancreatitis - nothing recent. But says she has  been belching a lot lately and has a pain in left chest  when she tips to the side. No heartburn.  No dysphagia..  No blood in stools.     Raynauds with cold pain      Renal Cyst noted on CT this year. BMP WNL.       Spider veins of limb - not sensitive      Tear of medial meniscus of right knee -Dr Tenorio. This is doing better but clicks a lot.  Not painful      Former smoker. 16 years ago.,  She smoked 1- 1.5 PPD for 30+.      I have personally reviewed the patients OARRS report. . This report is filed in the EHR. I have considered the risks of abuse, addiction and diversion. I believe it is clinically appropriate to continue to prescribe this medication.  Brief increase after surgery      UDS 10/20/20 No Norco as cutting back 12/9/21 as expected for medication profile1/13/23 as expected for medication profile 3/25/24 as expected for medication profile   CSA 12/4/23 for Ambien;  CSA 3/25/24  for Norco   ORT 4/27/20 score 2  Mammogram 1/9/24 ordered   scope 2/22/23   Lipids 9/24/24    Review of Systems    Objective   /72 (BP Location: Left arm, Patient Position: Sitting)   Pulse 81   Wt 61.2 kg (135 lb)   SpO2 98%   BMI 23.91 kg/m²     Physical Exam  Vitals reviewed.   Constitutional:       General: She is in acute distress.      Appearance: Normal appearance.      Comments: Standing in the room as cannot get comfortable in chair    HENT:      Head: Normocephalic.   Neck:      Vascular: No carotid bruit.   Cardiovascular:      Rate and Rhythm: Normal rate and regular rhythm.      Pulses: Normal pulses.      Heart sounds: Normal heart sounds. No murmur heard.  Pulmonary:      Effort: Pulmonary effort is normal. No respiratory distress.      Breath sounds: Normal breath sounds.   Abdominal:      General: Abdomen is flat. Bowel sounds are normal. There is no distension.      Palpations: Abdomen is soft. There is no mass.      Tenderness: There is no abdominal tenderness.   Musculoskeletal:      Cervical back: Normal range of motion and neck supple. No tenderness.       Comments: Has good ROM of the neck with not a lot of tenderness.   Low back with healing incision. Not red    Lymphadenopathy:      Cervical: No cervical adenopathy.   Skin:     General: Skin is warm and dry.      Findings: No rash.   Neurological:      General: No focal deficit present.      Mental Status: She is alert and oriented to person, place, and time.   Psychiatric:         Mood and Affect: Mood normal.         Thought Content: Thought content normal.         Judgment: Judgment normal.         Assessment/Plan   Diagnoses and all orders for this visit:  Screening mammogram, encounter for  Cervicalgia  Menopause  Anxiety  Basal cell carcinoma (BCC) of skin of nose  Benign hypertension  Cervical spondylosis without myelopathy  Chronic allergic rhinitis  Depression, major, single episode, moderate (Multi)  Displacement of lumbar intervertebral disc without myelopathy  Fibromyalgia  Generalized osteoarthritis of multiple sites  Acquired hypothyroidism  Psychophysiological insomnia  Irritable bowel syndrome with diarrhea  Neurogenic claudication due to lumbar spinal stenosis  Steatorrhea (HHS-HCC)  Other orders  -     Follow Up In Primary Care - Established

## 2024-12-23 ENCOUNTER — OFFICE VISIT (OUTPATIENT)
Dept: NEUROSURGERY | Facility: CLINIC | Age: 64
End: 2024-12-23
Payer: COMMERCIAL

## 2024-12-23 ENCOUNTER — APPOINTMENT (OUTPATIENT)
Dept: PRIMARY CARE | Facility: CLINIC | Age: 64
End: 2024-12-23
Payer: COMMERCIAL

## 2024-12-23 VITALS
HEIGHT: 63 IN | BODY MASS INDEX: 23.04 KG/M2 | WEIGHT: 130 LBS | SYSTOLIC BLOOD PRESSURE: 110 MMHG | TEMPERATURE: 97.3 F | HEART RATE: 57 BPM | DIASTOLIC BLOOD PRESSURE: 72 MMHG

## 2024-12-23 DIAGNOSIS — M54.16 LUMBAR RADICULOPATHY: Primary | ICD-10-CM

## 2024-12-23 PROCEDURE — 3074F SYST BP LT 130 MM HG: CPT | Performed by: PHYSICIAN ASSISTANT

## 2024-12-23 PROCEDURE — 99024 POSTOP FOLLOW-UP VISIT: CPT | Performed by: PHYSICIAN ASSISTANT

## 2024-12-23 PROCEDURE — 3008F BODY MASS INDEX DOCD: CPT | Performed by: PHYSICIAN ASSISTANT

## 2024-12-23 PROCEDURE — 3078F DIAST BP <80 MM HG: CPT | Performed by: PHYSICIAN ASSISTANT

## 2024-12-23 PROCEDURE — 1036F TOBACCO NON-USER: CPT | Performed by: PHYSICIAN ASSISTANT

## 2024-12-23 ASSESSMENT — PAIN SCALES - GENERAL: PAINLEVEL_OUTOF10: 4

## 2024-12-23 ASSESSMENT — PATIENT HEALTH QUESTIONNAIRE - PHQ9
SUM OF ALL RESPONSES TO PHQ9 QUESTIONS 1 & 2: 0
2. FEELING DOWN, DEPRESSED OR HOPELESS: NOT AT ALL
1. LITTLE INTEREST OR PLEASURE IN DOING THINGS: NOT AT ALL

## 2024-12-23 NOTE — PROGRESS NOTES
[facility] [place]  Department of Neurological Surgery  Post Operative Patient Visit      History of Present Illness:  Caitlin Hogan is a 64 y.o. year old female who presents post L3-4, L4-5 laminotomies and foraminotimy on 12/05/24 with Dr. Graeme Friedman.  Overall the patient is doing very well.  She is up and moving about and able to perform all her ADLs.  She declined PT referral today as she is able to manage at home independently with home exercise program and walking.  Patient reminded no bending, twisting or heavy lifting until follow up with surgeon. Reminded he to bend from the hips/knees.  She can increase activity as tolerated and encouraged walking regularly.  Patient likes to swim but will hold off until incision is fully healed and appears as a scar. Her preoperative pain is relieved and now only suffers from some incisional pain that she is managing with acetaminophen and hydrocodone (2-3 times per day).  We discussed weaning off this and she states she would start.  She also has been prescribed baclofen TID (however only taking BID), encouraged her to try and see if TID dosing helps her pain and allows her to wean off the narcotic.  She verbalized understanding.  Her incision today is CDI and no s/s of infection noted.  Her mesh glue was completely off and skin well approximated. She is scheduled to follow up with Dr. Friedman in the office on 02/17/24.  She will call the office with any issues between now and then.  Patient okay to drive when off narcotics during the day.          14/14 systems reviewed and negative other than what is listed in the history of present illness    Patient Active Problem List   Diagnosis    Anxiety    Basal cell carcinoma    Benign hypertension    Cervical radiculitis    Cervical spondylosis without myelopathy    Chronic allergic rhinitis    Depression, major, single episode, moderate (Multi)    Generalized osteoarthritis of multiple sites    GERD  (gastroesophageal reflux disease)    Hypothyroidism    Impingement syndrome of right shoulder    Insomnia    Irritable bowel syndrome with diarrhea    Osteoporosis    Fibromyalgia    Spider veins of limb    Steatorrhea (HHS-HCC)    Synovial plica syndrome of left knee    Tear of medial meniscus of right knee    Neurogenic claudication due to lumbar spinal stenosis    Displacement of lumbar intervertebral disc without myelopathy    Lumbar spondylosis    Lumbar radiculopathy     Past Medical History:   Diagnosis Date    Abdominal distension (gaseous) 2021    Bloating    ADHD (attention deficit hyperactivity disorder)     Allergic     Anxiety     Arthritis     Basal cell carcinoma     NOSE    Cervical disc disorder     Cervicalgia     Depression     Disease of thyroid gland     GERD (gastroesophageal reflux disease)     Hypertension     Hypothyroidism     Insomnia     Low back pain     Lumbar radiculopathy     Mitral valve prolapse     Osteopenia     Personal history of other diseases of the circulatory system 2021    History of abnormal electrocardiography    Personal history of other diseases of the respiratory system 2021    History of acute sinusitis    Personal history of other diseases of urinary system 2021    History of hematuria    Personal history of other endocrine, nutritional and metabolic disease     History of thyroid disorder    Raynaud's syndrome     Unspecified abdominal pain 2021    Chronic abdominal pain     Past Surgical History:   Procedure Laterality Date    BACK SURGERY  2024     SECTION, LOW TRANSVERSE      HYSTERECTOMY      INJECTION N/A 2024    L4-5 MICHELE    IR INJECTION EPIDURAL STEROID Left 2024    Left L3/4-L4/5 TFESI    OTHER SURGICAL HISTORY  10/23/2019    Bursectomy    OTHER SURGICAL HISTORY  2017    Colonoscopy    OTHER SURGICAL HISTORY  10/23/2019    Colon surgery    OTHER SURGICAL HISTORY  10/23/2019     section     OTHER SURGICAL HISTORY  10/23/2019    Hysterectomy total    OTHER SURGICAL HISTORY  10/01/2021    Epidural steroid injection    OTHER SURGICAL HISTORY  11/18/2019    Epidural steroid injection    SMALL INTESTINE SURGERY       Social History     Tobacco Use    Smoking status: Former     Current packs/day: 1.50     Types: Cigarettes    Smokeless tobacco: Never   Substance Use Topics    Alcohol use: Yes     Alcohol/week: 2.0 standard drinks of alcohol     Types: 2 Cans of beer per week     Comment: 2 drinks/week     family history includes Aneurysm in her father; COPD in her father; Cancer in her brother and sister; Colon cancer in her brother and sister; Deep vein thrombosis in her sister; Dementia in her mother; Heart disease in her father; Hypertension in her father, mother, and sister; Ovarian cancer in her sister; bladder cancer in her brother.    Current Outpatient Medications:     baclofen (Lioresal) 10 mg tablet, Take 1 tablet (10 mg) by mouth 3 times a day., Disp: 90 tablet, Rfl: 2    biotin 5 mg capsule, Take 1 capsule (5 mg) by mouth once daily., Disp: , Rfl:     calcium carbonate-vitamin D3 600 mg-5 mcg (200 unit) tablet, Take 1 tablet by mouth once daily., Disp: , Rfl:     cholecalciferol (Vitamin D-3) 25 MCG (1000 UT) capsule, Take 1 capsule (25 mcg) by mouth once daily., Disp: , Rfl:     HYDROcodone-acetaminophen (Norco) 5-325 mg tablet, Take 1 tablet by mouth 3 times a day., Disp: 90 tablet, Rfl: 0    ibandronate (Boniva) 150 mg tablet, Take 1 tablet (150 mg) by mouth every 30 (thirty) days., Disp: 4 tablet, Rfl: 3    levothyroxine (Synthroid, Levoxyl) 100 mcg tablet, Take 1 tablet (100 mcg) by mouth once daily., Disp: 90 tablet, Rfl: 1    lisinopril 5 mg tablet, Take 1 tablet (5 mg) by mouth once daily., Disp: 90 tablet, Rfl: 1    meloxicam (Mobic) 15 mg tablet, Take 1 tablet (15 mg) by mouth once daily., Disp: 90 tablet, Rfl: 0    multivitamin tablet, Take 1 tablet by mouth once daily., Disp: , Rfl:      naloxone (Narcan) 4 mg/0.1 mL nasal spray, Administer 1 spray (4 mg) into affected nostril(s) if needed for opioid reversal. May repeat every 2-3 minutes if needed, alternating nostrils, until medical assistance becomes available., Disp: 2 each, Rfl: 0    pregabalin (Lyrica) 25 mg capsule, Take 3 capsules (75 mg) by mouth 2 times a day., Disp: 180 capsule, Rfl: 1    zolpidem (Ambien) 5 mg tablet, Take 1 tablet (5 mg) by mouth as needed at bedtime for sleep., Disp: 30 tablet, Rfl: 0  Allergies   Allergen Reactions    Cephalexin Nausea Only         Samantha Meeson, MSN, NP-C  Adult-Gerontology Associate Nurse Practitioner  Department of Neurosurgery- [facility] [place]  Main phone [phone]  Fax [phone]

## 2025-01-02 DIAGNOSIS — G47.00 INSOMNIA, UNSPECIFIED TYPE: ICD-10-CM

## 2025-01-02 DIAGNOSIS — I10 BENIGN HYPERTENSION: ICD-10-CM

## 2025-01-02 DIAGNOSIS — E03.9 HYPOTHYROIDISM, UNSPECIFIED TYPE: ICD-10-CM

## 2025-01-02 RX ORDER — LEVOTHYROXINE SODIUM 100 UG/1
100 TABLET ORAL DAILY
Qty: 90 TABLET | Refills: 0 | Status: SHIPPED | OUTPATIENT
Start: 2025-01-02

## 2025-01-02 RX ORDER — LISINOPRIL 5 MG/1
5 TABLET ORAL DAILY
Qty: 90 TABLET | Refills: 0 | Status: SHIPPED | OUTPATIENT
Start: 2025-01-02

## 2025-01-02 RX ORDER — ZOLPIDEM TARTRATE 5 MG/1
5 TABLET ORAL NIGHTLY PRN
Qty: 30 TABLET | Refills: 0 | Status: SHIPPED | OUTPATIENT
Start: 2025-01-02 | End: 2025-02-01

## 2025-01-20 ENCOUNTER — HOSPITAL ENCOUNTER (OUTPATIENT)
Dept: RADIOLOGY | Facility: CLINIC | Age: 65
Discharge: HOME | End: 2025-01-20
Payer: COMMERCIAL

## 2025-01-20 VITALS — BODY MASS INDEX: 23.03 KG/M2 | WEIGHT: 129.98 LBS | HEIGHT: 63 IN

## 2025-01-20 DIAGNOSIS — Z12.31 SCREENING MAMMOGRAM, ENCOUNTER FOR: ICD-10-CM

## 2025-01-20 DIAGNOSIS — M54.2 CERVICALGIA: ICD-10-CM

## 2025-01-20 DIAGNOSIS — Z78.0 MENOPAUSE: ICD-10-CM

## 2025-01-20 PROCEDURE — 77080 DXA BONE DENSITY AXIAL: CPT

## 2025-01-20 PROCEDURE — 77067 SCR MAMMO BI INCL CAD: CPT | Performed by: RADIOLOGY

## 2025-01-20 PROCEDURE — 77063 BREAST TOMOSYNTHESIS BI: CPT | Performed by: RADIOLOGY

## 2025-01-20 PROCEDURE — 77063 BREAST TOMOSYNTHESIS BI: CPT

## 2025-01-20 PROCEDURE — 77080 DXA BONE DENSITY AXIAL: CPT | Performed by: RADIOLOGY

## 2025-01-21 RX ORDER — HYDROCODONE BITARTRATE AND ACETAMINOPHEN 5; 325 MG/1; MG/1
1 TABLET ORAL 3 TIMES DAILY
Qty: 90 TABLET | Refills: 0 | Status: SHIPPED | OUTPATIENT
Start: 2025-01-21

## 2025-01-27 ENCOUNTER — TELEPHONE (OUTPATIENT)
Dept: PAIN MEDICINE | Facility: CLINIC | Age: 65
End: 2025-01-27
Payer: COMMERCIAL

## 2025-01-27 DIAGNOSIS — M51.26 DISPLACEMENT OF LUMBAR INTERVERTEBRAL DISC WITHOUT MYELOPATHY: ICD-10-CM

## 2025-01-27 DIAGNOSIS — M47.816 LUMBAR SPONDYLOSIS: ICD-10-CM

## 2025-01-27 RX ORDER — PREGABALIN 25 MG/1
75 CAPSULE ORAL 2 TIMES DAILY
Qty: 180 CAPSULE | Refills: 1 | Status: SHIPPED | OUTPATIENT
Start: 2025-01-27 | End: 2025-03-28

## 2025-01-31 DIAGNOSIS — G47.00 INSOMNIA, UNSPECIFIED TYPE: ICD-10-CM

## 2025-01-31 RX ORDER — ZOLPIDEM TARTRATE 5 MG/1
5 TABLET ORAL NIGHTLY PRN
Qty: 30 TABLET | Refills: 0 | Status: SHIPPED | OUTPATIENT
Start: 2025-01-31 | End: 2025-03-02

## 2025-01-31 NOTE — PROGRESS NOTES
[facility] [place]  Department of Neurological Surgery  Post Operative Patient Visit      History of Present Illness:  Caitlin Hogan is a 64 y.o. year old female who presents to the spine clinic in a post operative visit. Since surgery they are 2-3 months s/p L3-4, L4-5 laminotomies and foraminotimy on 12/05/24. Today the patient reports nerve pain that starts at her hip radiating into her gluteus minimus. This pain started approximately 2-3 weeks ago. She is unsure if this pain is related to her surgery or if it is from a recent sinus infection. She has held off of aquatic therapy at the [facility] due to wanting her incision to fully heal. We discussed possible treatment options including starting a Medrol dose pack to help with inflammation, prescription sent to the patient's pharmacy. I will also obtain updated imaging to check the hardware positioning, lumbar x-ray ordered. If her pain does not resolve over the next 6 weeks, the next steps will be to order an MRI lumbar. Patient will send a message via [employer] on her status. Patient verbalized understanding and will follow-up in 3 months.       The above clinical summary has been dictated with voice recognition software. It has not been proofread for grammatical errors, typographical mistakes, or other semantic inconsistencies.    Thank you for visiting our office today. It was our pleasure to take part in your healthcare.     Do not hesitate to call with any questions regarding your plan of care after leaving at [phone] M-F 8am-4pm.     To clinicians, thank you very much for this kind referral. It is a privilege to partner with you in the care of your patients. My office would be delighted to assist you with any further consultations or with questions regarding the plan of care outlined. Do not hesitate to call the office or contact me directly.       Sincerely,      Graeme Friedman MD, [facility]  Spine ,  [facility]  Landry Kirk Chair in Spinal Neurosurgery  Complex Spine Surgery Fellowship Director   of Neurological Surgery  [facility] [employer]  Phone: [phone]  Fax: [phone]        Scribe Attestation  By signing my name below, I, La Almanzar   attest that this documentation has been prepared under the direction and in the presence of Graeme Friedman MD.

## 2025-02-05 DIAGNOSIS — J01.90 ACUTE NON-RECURRENT SINUSITIS, UNSPECIFIED LOCATION: Primary | ICD-10-CM

## 2025-02-05 RX ORDER — DOXYCYCLINE 100 MG/1
100 CAPSULE ORAL 2 TIMES DAILY
Qty: 14 CAPSULE | Refills: 0 | Status: SHIPPED | OUTPATIENT
Start: 2025-02-05 | End: 2025-02-12

## 2025-02-17 ENCOUNTER — HOSPITAL ENCOUNTER (OUTPATIENT)
Dept: RADIOLOGY | Facility: HOSPITAL | Age: 65
Discharge: HOME | End: 2025-02-17
Payer: COMMERCIAL

## 2025-02-17 ENCOUNTER — OFFICE VISIT (OUTPATIENT)
Facility: CLINIC | Age: 65
End: 2025-02-17
Payer: COMMERCIAL

## 2025-02-17 VITALS
DIASTOLIC BLOOD PRESSURE: 80 MMHG | SYSTOLIC BLOOD PRESSURE: 128 MMHG | TEMPERATURE: 97.6 F | WEIGHT: 129 LBS | HEIGHT: 63 IN | HEART RATE: 89 BPM | RESPIRATION RATE: 16 BRPM | BODY MASS INDEX: 22.86 KG/M2

## 2025-02-17 DIAGNOSIS — M54.16 LUMBAR RADICULOPATHY: ICD-10-CM

## 2025-02-17 DIAGNOSIS — M54.16 LUMBAR RADICULOPATHY: Primary | ICD-10-CM

## 2025-02-17 PROCEDURE — 72120 X-RAY BEND ONLY L-S SPINE: CPT

## 2025-02-17 PROCEDURE — 3079F DIAST BP 80-89 MM HG: CPT | Performed by: STUDENT IN AN ORGANIZED HEALTH CARE EDUCATION/TRAINING PROGRAM

## 2025-02-17 PROCEDURE — 3008F BODY MASS INDEX DOCD: CPT | Performed by: STUDENT IN AN ORGANIZED HEALTH CARE EDUCATION/TRAINING PROGRAM

## 2025-02-17 PROCEDURE — 1036F TOBACCO NON-USER: CPT | Performed by: STUDENT IN AN ORGANIZED HEALTH CARE EDUCATION/TRAINING PROGRAM

## 2025-02-17 PROCEDURE — 72110 X-RAY EXAM L-2 SPINE 4/>VWS: CPT | Performed by: RADIOLOGY

## 2025-02-17 PROCEDURE — 99211 OFF/OP EST MAY X REQ PHY/QHP: CPT | Performed by: STUDENT IN AN ORGANIZED HEALTH CARE EDUCATION/TRAINING PROGRAM

## 2025-02-17 PROCEDURE — 3074F SYST BP LT 130 MM HG: CPT | Performed by: STUDENT IN AN ORGANIZED HEALTH CARE EDUCATION/TRAINING PROGRAM

## 2025-02-17 RX ORDER — METHYLPREDNISOLONE 4 MG/1
TABLET ORAL
Qty: 21 TABLET | Refills: 0 | Status: SHIPPED | OUTPATIENT
Start: 2025-02-17 | End: 2025-02-23

## 2025-02-17 ASSESSMENT — PAIN SCALES - GENERAL: PAINLEVEL_OUTOF10: 3

## 2025-02-20 DIAGNOSIS — M54.2 CERVICALGIA: ICD-10-CM

## 2025-02-20 RX ORDER — HYDROCODONE BITARTRATE AND ACETAMINOPHEN 5; 325 MG/1; MG/1
1 TABLET ORAL 3 TIMES DAILY
Qty: 90 TABLET | Refills: 0 | Status: SHIPPED | OUTPATIENT
Start: 2025-02-20

## 2025-03-04 DIAGNOSIS — G47.00 INSOMNIA, UNSPECIFIED TYPE: ICD-10-CM

## 2025-03-04 RX ORDER — ZOLPIDEM TARTRATE 5 MG/1
5 TABLET ORAL NIGHTLY PRN
Qty: 30 TABLET | Refills: 0 | Status: SHIPPED | OUTPATIENT
Start: 2025-03-04 | End: 2025-04-03

## 2025-03-19 ENCOUNTER — PATIENT MESSAGE (OUTPATIENT)
Dept: PRIMARY CARE | Facility: CLINIC | Age: 65
End: 2025-03-19
Payer: COMMERCIAL

## 2025-03-19 DIAGNOSIS — M54.2 CERVICALGIA: ICD-10-CM

## 2025-03-19 RX ORDER — HYDROCODONE BITARTRATE AND ACETAMINOPHEN 5; 325 MG/1; MG/1
1 TABLET ORAL 3 TIMES DAILY
Qty: 90 TABLET | Refills: 0 | Status: SHIPPED | OUTPATIENT
Start: 2025-03-19

## 2025-03-19 NOTE — PATIENT COMMUNICATION
Patient is scheduled to see you on 3/25/25. Please adjust medication quantity to appropriate amount if you feel it is necessary. Thanks!

## 2025-03-20 ENCOUNTER — TELEPHONE (OUTPATIENT)
Dept: PAIN MEDICINE | Facility: CLINIC | Age: 65
End: 2025-03-20
Payer: COMMERCIAL

## 2025-03-20 DIAGNOSIS — M54.12 CERVICAL RADICULITIS: ICD-10-CM

## 2025-03-20 RX ORDER — BACLOFEN 10 MG/1
10 TABLET ORAL 3 TIMES DAILY
Qty: 90 TABLET | Refills: 2 | Status: SHIPPED | OUTPATIENT
Start: 2025-03-20

## 2025-03-25 ENCOUNTER — APPOINTMENT (OUTPATIENT)
Dept: PRIMARY CARE | Facility: CLINIC | Age: 65
End: 2025-03-25
Payer: COMMERCIAL

## 2025-03-25 VITALS
DIASTOLIC BLOOD PRESSURE: 74 MMHG | BODY MASS INDEX: 23.56 KG/M2 | SYSTOLIC BLOOD PRESSURE: 126 MMHG | OXYGEN SATURATION: 98 % | HEART RATE: 93 BPM | WEIGHT: 133 LBS

## 2025-03-25 DIAGNOSIS — I10 BENIGN HYPERTENSION: ICD-10-CM

## 2025-03-25 DIAGNOSIS — M54.2 CERVICALGIA: ICD-10-CM

## 2025-03-25 DIAGNOSIS — Z79.899 CONTROLLED SUBSTANCE AGREEMENT SIGNED: Primary | ICD-10-CM

## 2025-03-25 DIAGNOSIS — E03.9 HYPOTHYROIDISM, UNSPECIFIED TYPE: ICD-10-CM

## 2025-03-25 PROCEDURE — 3074F SYST BP LT 130 MM HG: CPT

## 2025-03-25 PROCEDURE — 1036F TOBACCO NON-USER: CPT

## 2025-03-25 PROCEDURE — 99214 OFFICE O/P EST MOD 30 MIN: CPT

## 2025-03-25 PROCEDURE — 3078F DIAST BP <80 MM HG: CPT

## 2025-03-25 RX ORDER — LEVOTHYROXINE SODIUM 100 UG/1
100 TABLET ORAL DAILY
Qty: 90 TABLET | Refills: 3 | Status: SHIPPED | OUTPATIENT
Start: 2025-03-25

## 2025-03-25 RX ORDER — LISINOPRIL 5 MG/1
5 TABLET ORAL DAILY
Qty: 90 TABLET | Refills: 3 | Status: SHIPPED | OUTPATIENT
Start: 2025-03-25

## 2025-03-25 ASSESSMENT — ENCOUNTER SYMPTOMS
POLYDIPSIA: 0
MYALGIAS: 0
DIARRHEA: 0
WEAKNESS: 0
RECTAL PAIN: 0
NERVOUS/ANXIOUS: 0
SLEEP DISTURBANCE: 1
CHILLS: 0
NECK PAIN: 1
SHORTNESS OF BREATH: 0
UNEXPECTED WEIGHT CHANGE: 0
PALPITATIONS: 0
BACK PAIN: 1
CONSTIPATION: 0
ARTHRALGIAS: 0
NUMBNESS: 0
NECK STIFFNESS: 1
FREQUENCY: 0
FATIGUE: 0
HEADACHES: 0
POLYPHAGIA: 0
DIFFICULTY URINATING: 0
DIAPHORESIS: 0
WOUND: 0
CHEST TIGHTNESS: 0
ABDOMINAL PAIN: 0
TROUBLE SWALLOWING: 0
NAUSEA: 0

## 2025-03-25 NOTE — PROGRESS NOTES
Subjective   Patient ID: Caitlin Hogan is a 64 y.o. female who presents for Med Management.    Patient presents today for medication check.  UDS obtained.    Chronic cervical and lower back pain: Patient had a laminectomy in December from Dr. Graeme Friedman.  States that her pain is reduced by approximately 10 to 20%.  However she is still having more pain and Dr. Friedman is considering a second MRI to evaluate the necessity for revision.  Patient states that revision may include a fusion depending on the findings.  Her goal with use of pain medication and surgery is to return to normal ADLs.  Has trouble standing and bending, sitting for long periods of time.  Feels best lying down no pressure in her lower back.    Insomnia: Controlled Ambien.  Able to sleep for 5 hours, interrupted due to back pain.  No history of oversedation or overdose.      Review of Systems   Constitutional:  Negative for chills, diaphoresis, fatigue and unexpected weight change.   HENT:  Negative for dental problem, tinnitus and trouble swallowing.    Eyes:  Negative for visual disturbance.   Respiratory:  Negative for chest tightness and shortness of breath.    Cardiovascular:  Negative for chest pain, palpitations and leg swelling.   Gastrointestinal:  Negative for abdominal pain, constipation, diarrhea, nausea and rectal pain.   Endocrine: Negative for polydipsia, polyphagia and polyuria.   Genitourinary:  Negative for difficulty urinating, frequency and urgency.   Musculoskeletal:  Positive for back pain, neck pain and neck stiffness. Negative for arthralgias and myalgias.   Skin:  Negative for pallor and wound.   Neurological:  Negative for syncope, weakness, numbness and headaches.   Psychiatric/Behavioral:  Positive for sleep disturbance. Negative for suicidal ideas. The patient is not nervous/anxious.        Objective   /74 (BP Location: Left arm, Patient Position: Sitting)   Pulse 93   Wt 60.3 kg (133 lb)   SpO2 98%   BMI  23.56 kg/m²     Physical Exam  Vitals and nursing note reviewed.   Constitutional:       General: She is not in acute distress.     Appearance: Normal appearance.   HENT:      Head: Normocephalic.      Nose: Nose normal.      Mouth/Throat:      Mouth: Mucous membranes are moist.      Pharynx: Oropharynx is clear.   Eyes:      General: No scleral icterus.     Pupils: Pupils are equal, round, and reactive to light.   Neck:      Vascular: No carotid bruit.   Cardiovascular:      Rate and Rhythm: Normal rate and regular rhythm.      Pulses: Normal pulses.      Heart sounds: Normal heart sounds. No murmur heard.  Pulmonary:      Effort: Pulmonary effort is normal. No respiratory distress.      Breath sounds: Normal breath sounds. No stridor. No wheezing, rhonchi or rales.   Abdominal:      General: Bowel sounds are normal. There is no distension.      Palpations: Abdomen is soft.      Tenderness: There is no abdominal tenderness. There is no right CVA tenderness or left CVA tenderness.   Musculoskeletal:         General: No swelling. Normal range of motion.      Cervical back: Normal range of motion.      Right lower leg: No edema.      Left lower leg: No edema.   Skin:     General: Skin is warm and dry.      Capillary Refill: Capillary refill takes less than 2 seconds.   Neurological:      General: No focal deficit present.      Mental Status: She is alert and oriented to person, place, and time. Mental status is at baseline.   Psychiatric:         Mood and Affect: Mood normal.         Behavior: Behavior normal.         Thought Content: Thought content normal.         Judgment: Judgment normal.         Assessment/Plan   Diagnoses and all orders for this visit:  Controlled substance agreement signed  -     DRUG SCREEN,URINE; Future  Cervicalgia  -     Follow Up In Primary Care - Established  Hypothyroidism, unspecified type  -     levothyroxine (Synthroid, Levoxyl) 100 mcg tablet; Take 1 tablet (100 mcg) by mouth once  daily.  Benign hypertension  -     lisinopril 5 mg tablet; Take 1 tablet (5 mg) by mouth once daily.

## 2025-03-26 LAB
AMPHETAMINES UR QL: NEGATIVE NG/ML
BARBITURATES UR QL: NEGATIVE NG/ML
BENZODIAZ UR QL: NEGATIVE NG/ML
BZE UR QL: NEGATIVE NG/ML
CREAT UR-MCNC: 123.2 MG/DL
METHADONE UR QL: NEGATIVE NG/ML
OPIATES UR QL: POSITIVE NG/ML
OXIDANTS UR QL: NEGATIVE MCG/ML
OXYCODONE UR QL: NEGATIVE NG/ML
PH UR: 7.1 [PH] (ref 4.5–9)
QUEST NOTES AND COMMENTS: ABNORMAL
THC UR QL: NEGATIVE NG/ML

## 2025-04-02 DIAGNOSIS — G47.00 INSOMNIA, UNSPECIFIED TYPE: ICD-10-CM

## 2025-04-02 RX ORDER — ZOLPIDEM TARTRATE 5 MG/1
5 TABLET ORAL NIGHTLY PRN
Qty: 30 TABLET | Refills: 0 | Status: SHIPPED | OUTPATIENT
Start: 2025-04-02 | End: 2025-05-02

## 2025-04-14 DIAGNOSIS — G47.00 INSOMNIA, UNSPECIFIED TYPE: ICD-10-CM

## 2025-04-14 RX ORDER — ZOLPIDEM TARTRATE 5 MG/1
5 TABLET ORAL NIGHTLY PRN
Qty: 30 TABLET | Refills: 0 | Status: SHIPPED | OUTPATIENT
Start: 2025-04-14 | End: 2025-05-14

## 2025-04-15 DIAGNOSIS — M54.16 LUMBAR RADICULOPATHY: Primary | ICD-10-CM

## 2025-04-15 NOTE — PROGRESS NOTES
Patient with return of symptoms after laminotomy, foraminotomy, concern for recurrent disc herniation, compression of nerves, possible collapse of disc spaces. Will get updated MRI as patient continues failing basic conservative care including lifestyle modifications, restriction of basic activities of daily living, physical therapy exercises, and medications.        Graeme Friedman MD, [facility]  Spine , [facility]  Landry Kirk and Arielle Kirk Chair in Spinal Neurosurgery  Neurosurgery , [facility]  Complex Spine Surgery Fellowship Director   of Neurological Surgery  [facility] [employer]  Office: [phone]  Fax: [phone]

## 2025-04-18 ENCOUNTER — PATIENT MESSAGE (OUTPATIENT)
Dept: PRIMARY CARE | Facility: CLINIC | Age: 65
End: 2025-04-18
Payer: COMMERCIAL

## 2025-04-18 DIAGNOSIS — M54.2 CERVICALGIA: ICD-10-CM

## 2025-04-18 RX ORDER — HYDROCODONE BITARTRATE AND ACETAMINOPHEN 5; 325 MG/1; MG/1
1 TABLET ORAL 3 TIMES DAILY
Qty: 90 TABLET | Refills: 0 | Status: SHIPPED | OUTPATIENT
Start: 2025-04-18

## 2025-04-25 ENCOUNTER — APPOINTMENT (OUTPATIENT)
Dept: RADIOLOGY | Facility: HOSPITAL | Age: 65
End: 2025-04-25
Payer: COMMERCIAL

## 2025-05-02 ENCOUNTER — APPOINTMENT (OUTPATIENT)
Dept: RADIOLOGY | Facility: HOSPITAL | Age: 65
End: 2025-05-02
Payer: COMMERCIAL

## 2025-05-13 DIAGNOSIS — G47.00 INSOMNIA, UNSPECIFIED TYPE: ICD-10-CM

## 2025-05-13 RX ORDER — ZOLPIDEM TARTRATE 5 MG/1
5 TABLET ORAL NIGHTLY PRN
Qty: 30 TABLET | Refills: 0 | Status: SHIPPED | OUTPATIENT
Start: 2025-05-13 | End: 2025-06-12

## 2025-05-15 ENCOUNTER — HOSPITAL ENCOUNTER (OUTPATIENT)
Dept: RADIOLOGY | Facility: HOSPITAL | Age: 65
Discharge: HOME | End: 2025-05-15
Payer: COMMERCIAL

## 2025-05-15 DIAGNOSIS — M54.16 LUMBAR RADICULOPATHY: ICD-10-CM

## 2025-05-15 PROCEDURE — 72148 MRI LUMBAR SPINE W/O DYE: CPT

## 2025-05-16 ENCOUNTER — APPOINTMENT (OUTPATIENT)
Dept: NEUROSURGERY | Facility: CLINIC | Age: 65
End: 2025-05-16
Payer: COMMERCIAL

## 2025-05-16 ENCOUNTER — PATIENT MESSAGE (OUTPATIENT)
Dept: PRIMARY CARE | Facility: CLINIC | Age: 65
End: 2025-05-16

## 2025-05-16 VITALS
HEIGHT: 63 IN | TEMPERATURE: 97.8 F | SYSTOLIC BLOOD PRESSURE: 124 MMHG | DIASTOLIC BLOOD PRESSURE: 80 MMHG | HEART RATE: 84 BPM | BODY MASS INDEX: 23.04 KG/M2 | WEIGHT: 130 LBS

## 2025-05-16 DIAGNOSIS — M54.2 CERVICALGIA: ICD-10-CM

## 2025-05-16 DIAGNOSIS — M54.16 LUMBAR RADICULOPATHY: Primary | ICD-10-CM

## 2025-05-16 PROCEDURE — 3008F BODY MASS INDEX DOCD: CPT | Performed by: STUDENT IN AN ORGANIZED HEALTH CARE EDUCATION/TRAINING PROGRAM

## 2025-05-16 PROCEDURE — 99214 OFFICE O/P EST MOD 30 MIN: CPT | Performed by: STUDENT IN AN ORGANIZED HEALTH CARE EDUCATION/TRAINING PROGRAM

## 2025-05-16 PROCEDURE — 3079F DIAST BP 80-89 MM HG: CPT | Performed by: STUDENT IN AN ORGANIZED HEALTH CARE EDUCATION/TRAINING PROGRAM

## 2025-05-16 PROCEDURE — 1036F TOBACCO NON-USER: CPT | Performed by: STUDENT IN AN ORGANIZED HEALTH CARE EDUCATION/TRAINING PROGRAM

## 2025-05-16 PROCEDURE — 3074F SYST BP LT 130 MM HG: CPT | Performed by: STUDENT IN AN ORGANIZED HEALTH CARE EDUCATION/TRAINING PROGRAM

## 2025-05-16 RX ORDER — HYDROCODONE BITARTRATE AND ACETAMINOPHEN 5; 325 MG/1; MG/1
1 TABLET ORAL 3 TIMES DAILY
Qty: 90 TABLET | Refills: 0 | Status: SHIPPED | OUTPATIENT
Start: 2025-05-16

## 2025-05-16 ASSESSMENT — PAIN SCALES - GENERAL: PAINLEVEL_OUTOF10: 4

## 2025-05-16 NOTE — PROGRESS NOTES
[facility] [place]  Department of Neurological Surgery  Established Patient Visit    History of Present Illness:  Caitlin Hogan is a 64 y.o. year old female who presents to the spine clinic in follow up with  5-6 months s/p L3-4, L4-5 laminotomies and foraminotimy on 12/05/24. Today she presents with left sided low back pain. She denies any symptoms on the right side. She is okay when getting up in the morning but the pain in her back is severe by the end of the day. She has difficulty with activities of daily living such as sitting in a carrasquillo at a restaurant or bending over to garden secondary to pain. She has updated imaging to review today.     Patient's BMI is Body mass index is 23.03 kg/m².    Diabetic: no       Osteoporosis: osteopenia  XR DEXA bone density  Result Date: 1/21/2025  DEXA:  According to World Health Organization criteria, classification is low bone mass (osteopenia)   Followup recommended in two years or sooner as clinically warranted.   All images and detailed analysis are available on the  Radiology PACS.   MACRO: None   Signed by: Manjinder Ontiveros 1/21/2025 10:06 AM Dictation workstation:   [id number]      Review of Systems:  14/14 systems reviewed and negative other than what is listed in the history of present illness    Problem List[1]  Medical History[2]  Surgical History[3]  Social History     Tobacco Use    Smoking status: Former     Current packs/day: 1.50     Types: Cigarettes    Smokeless tobacco: Never   Substance Use Topics    Alcohol use: Yes     Alcohol/week: 2.0 standard drinks of alcohol     Types: 2 Cans of beer per week     Comment: 2 drinks/week     family history includes Aneurysm in her father; COPD in her father; Cancer in her brother and sister; Colon cancer in her brother, brother, and sister; Deep vein thrombosis in her sister; Dementia in her mother; Heart disease in her father; Hypertension in her father, mother, and sister; Ovarian cancer in her  sister; bladder cancer in her brother.  Current Medications[4]  Allergies[5]    Physical Examination:    General: Well developed, awake/alert/oriented x3, no distress, alert and cooperative  Skin: Warm and dry, no lesions, no rashes  ENMT: Mucous membranes moist, no apparent injury, no lesions seen  Head/Neck: Neck Supple, no apparent injury  Respiratory/Thorax: Normal breath sounds with good chest expansion, thorax symmetric  Cardiovascular: No pitting edema, no JVD    Motor Strength: 5/5 Throughout all extremities    Muscle Bulk: Normal and symmetric in all extremities    Posture:   -- Cervical: Normal  -- Thoracic: Normal  -- Lumbar : Normal  Paraspinal muscle spasm/tenderness absent.     Sensation: intact to light touch    Left sided low back pain  Significant refractory left L4-5 radiculopathy  Pain radiates into the glutes on L5    Results:  I personally reviewed and interpreted the imaging results which included MRI L spine showing severe narrowing at L4-5 on the left side with a facet effusion and post op changes L3-5.    Assessment and Plan:      Caitlin Hogan is a 64 y.o. year old female who presents to the spine clinic in follow up with 5-6 months s/p L3-4, L4-5 laminotomies and foraminotimy on 12/05/24. Today she presents with left sided low back pain. She denies any symptoms on the right side. She is okay when getting up in the morning but the pain in her back is severe by the end of the day. She has difficulty with activities of daily living such as sitting in a carrasquillo at a restaurant or bending over to garden secondary to pain. She has updated imaging to review today.     I will put in a referral for her to pain medicine to try an injection on the left at L4-5 prior to consideration of surgery. I discussed the risks and benefits of a spinal fusion with her in detail.      I have reviewed all prior documentation and reviewed the electronic medical record since admission. I have personally have  reviewed all advanced imaging not just the reports and used my interpretation as documented as the relevant findings. I have reviewed the risks and benefits of all treatment recommendations listed in this note with the patient and family.       The above clinical summary has been dictated with voice recognition software. It has not been proofread for grammatical errors, typographical mistakes, or other semantic inconsistencies.    Thank you for visiting our office today. It was our pleasure to take part in your healthcare.     Do not hesitate to call with any questions regarding your plan of care after leaving at [phone] M-F 8am-4pm.     To clinicians, thank you very much for this kind referral. It is a privilege to partner with you in the care of your patients. My office would be delighted to assist you with any further consultations or with questions regarding the plan of care outlined. Do not hesitate to call the office or contact me directly.       Sincerely,      Graeme Friedman MD, [facility]  Spine , [place]  Landry Kirk Chair in Spinal Neurosurgery  Complex Spine Surgery Fellowship Director   of Neurological Surgery  [facility]  Phone: [phone]  Fax: [phone]        Scribe Attestation  By signing my name below, I, La Almanzar   attest that this documentation has been prepared under the direction and in the presence of Graeme Friedman MD.           [1]   Patient Active Problem List  Diagnosis    Anxiety    Basal cell carcinoma    Benign hypertension    Cervical radiculitis    Cervical spondylosis without myelopathy    Chronic allergic rhinitis    Depression, major, single episode, moderate (Multi)    Generalized osteoarthritis of multiple sites    GERD (gastroesophageal reflux disease)    Hypothyroidism    Impingement syndrome of right shoulder    Insomnia     Irritable bowel syndrome with diarrhea    Osteoporosis    Fibromyalgia    Spider veins of limb    Steatorrhea (HHS-HCC)    Synovial plica syndrome of left knee    Tear of medial meniscus of right knee    Neurogenic claudication due to lumbar spinal stenosis    Displacement of lumbar intervertebral disc without myelopathy    Lumbar spondylosis    Lumbar radiculopathy   [2]   Past Medical History:  Diagnosis Date    Abdominal distension (gaseous) 2021    Bloating    ADHD (attention deficit hyperactivity disorder)     Allergic     Anxiety     Arthritis     Basal cell carcinoma     NOSE    Cervical disc disorder     Cervicalgia     Depression     Disease of thyroid gland     GERD (gastroesophageal reflux disease)     Hypertension     Hypothyroidism     Insomnia     Low back pain     Lumbar radiculopathy     Mitral valve prolapse     Osteopenia     Personal history of other diseases of the circulatory system 2021    History of abnormal electrocardiography    Personal history of other diseases of the respiratory system 2021    History of acute sinusitis    Personal history of other diseases of urinary system 2021    History of hematuria    Personal history of other endocrine, nutritional and metabolic disease     History of thyroid disorder    Raynaud's syndrome     Unspecified abdominal pain 2021    Chronic abdominal pain   [3]   Past Surgical History:  Procedure Laterality Date    BACK SURGERY  2024     SECTION, LOW TRANSVERSE      HYSTERECTOMY      INJECTION N/A 2024    L4-5 MICHELE    IR INJECTION EPIDURAL STEROID Left 2024    Left L3/4-L4/5 TFESI    OTHER SURGICAL HISTORY  10/23/2019    Bursectomy    OTHER SURGICAL HISTORY  2017    Colonoscopy    OTHER SURGICAL HISTORY  10/23/2019    Colon surgery    OTHER SURGICAL HISTORY  10/23/2019     section    OTHER SURGICAL HISTORY  10/23/2019    Hysterectomy total    OTHER SURGICAL HISTORY  10/01/2021     Epidural steroid injection    OTHER SURGICAL HISTORY  11/18/2019    Epidural steroid injection    SMALL INTESTINE SURGERY     [4]   Current Outpatient Medications:     baclofen (Lioresal) 10 mg tablet, Take 1 tablet (10 mg) by mouth 3 times a day., Disp: 90 tablet, Rfl: 2    biotin 5 mg capsule, Take 1 capsule (5 mg) by mouth once daily., Disp: , Rfl:     HYDROcodone-acetaminophen (Norco) 5-325 mg tablet, Take 1 tablet by mouth 3 times a day., Disp: 90 tablet, Rfl: 0    ibandronate (Boniva) 150 mg tablet, Take 1 tablet (150 mg) by mouth every 30 (thirty) days., Disp: 4 tablet, Rfl: 3    levothyroxine (Synthroid, Levoxyl) 100 mcg tablet, Take 1 tablet (100 mcg) by mouth once daily., Disp: 90 tablet, Rfl: 3    lisinopril 5 mg tablet, Take 1 tablet (5 mg) by mouth once daily., Disp: 90 tablet, Rfl: 3    multivitamin tablet, Take 1 tablet by mouth once daily., Disp: , Rfl:     naloxone (Narcan) 4 mg/0.1 mL nasal spray, Administer 1 spray (4 mg) into affected nostril(s) if needed for opioid reversal. May repeat every 2-3 minutes if needed, alternating nostrils, until medical assistance becomes available., Disp: 2 each, Rfl: 0    zolpidem (Ambien) 5 mg tablet, Take 1 tablet (5 mg) by mouth as needed at bedtime for sleep., Disp: 30 tablet, Rfl: 0    calcium carbonate-vitamin D3 600 mg-5 mcg (200 unit) tablet, Take 1 tablet by mouth once daily. (Patient not taking: Reported on 5/16/2025), Disp: , Rfl:     cholecalciferol (Vitamin D-3) 25 MCG (1000 UT) capsule, Take 1 capsule (25 mcg) by mouth once daily. (Patient not taking: Reported on 5/16/2025), Disp: , Rfl:     meloxicam (Mobic) 15 mg tablet, Take 1 tablet (15 mg) by mouth once daily. (Patient not taking: Reported on 5/16/2025), Disp: 90 tablet, Rfl: 0    pregabalin (Lyrica) 25 mg capsule, Take 3 capsules (75 mg) by mouth 2 times a day., Disp: 180 capsule, Rfl: 1  [5]   Allergies  Allergen Reactions    Cephalexin Nausea Only

## 2025-06-11 ENCOUNTER — PATIENT MESSAGE (OUTPATIENT)
Dept: PRIMARY CARE | Facility: CLINIC | Age: 65
End: 2025-06-11
Payer: COMMERCIAL

## 2025-06-11 DIAGNOSIS — G47.00 INSOMNIA, UNSPECIFIED TYPE: ICD-10-CM

## 2025-06-11 RX ORDER — ZOLPIDEM TARTRATE 5 MG/1
5 TABLET ORAL NIGHTLY PRN
Qty: 30 TABLET | Refills: 0 | Status: SHIPPED | OUTPATIENT
Start: 2025-06-11 | End: 2025-07-11

## 2025-06-11 NOTE — TELEPHONE ENCOUNTER
Go ahead and propose refill, she has an appointment in 2 weeks we will get CSA renewed at that time

## 2025-06-17 ENCOUNTER — PATIENT MESSAGE (OUTPATIENT)
Dept: PRIMARY CARE | Facility: CLINIC | Age: 65
End: 2025-06-17
Payer: COMMERCIAL

## 2025-06-17 DIAGNOSIS — M54.2 CERVICALGIA: ICD-10-CM

## 2025-06-18 RX ORDER — HYDROCODONE BITARTRATE AND ACETAMINOPHEN 5; 325 MG/1; MG/1
1 TABLET ORAL 3 TIMES DAILY
Qty: 90 TABLET | Refills: 0 | Status: SHIPPED | OUTPATIENT
Start: 2025-06-18

## 2025-06-26 ENCOUNTER — APPOINTMENT (OUTPATIENT)
Dept: PRIMARY CARE | Facility: CLINIC | Age: 65
End: 2025-06-26
Payer: COMMERCIAL

## 2025-06-26 VITALS
WEIGHT: 131.3 LBS | SYSTOLIC BLOOD PRESSURE: 138 MMHG | HEIGHT: 63 IN | BODY MASS INDEX: 23.27 KG/M2 | DIASTOLIC BLOOD PRESSURE: 82 MMHG | OXYGEN SATURATION: 98 % | HEART RATE: 86 BPM

## 2025-06-26 DIAGNOSIS — Z79.899 CONTROLLED SUBSTANCE AGREEMENT SIGNED: Primary | ICD-10-CM

## 2025-06-26 DIAGNOSIS — F51.04 PSYCHOPHYSIOLOGICAL INSOMNIA: ICD-10-CM

## 2025-06-26 DIAGNOSIS — M47.812 CERVICAL SPONDYLOSIS WITHOUT MYELOPATHY: ICD-10-CM

## 2025-06-26 PROCEDURE — 3079F DIAST BP 80-89 MM HG: CPT

## 2025-06-26 PROCEDURE — 99214 OFFICE O/P EST MOD 30 MIN: CPT

## 2025-06-26 PROCEDURE — 3008F BODY MASS INDEX DOCD: CPT

## 2025-06-26 PROCEDURE — 1036F TOBACCO NON-USER: CPT

## 2025-06-26 PROCEDURE — 3075F SYST BP GE 130 - 139MM HG: CPT

## 2025-06-26 ASSESSMENT — ENCOUNTER SYMPTOMS
CHILLS: 0
CHEST TIGHTNESS: 0
FATIGUE: 0
POLYDIPSIA: 0
SHORTNESS OF BREATH: 0
CONSTIPATION: 0
NUMBNESS: 0
TROUBLE SWALLOWING: 0
MYALGIAS: 1
ARTHRALGIAS: 1
WOUND: 0
NERVOUS/ANXIOUS: 0
BACK PAIN: 1
ABDOMINAL PAIN: 0
DIAPHORESIS: 0
POLYPHAGIA: 0
HEADACHES: 0
UNEXPECTED WEIGHT CHANGE: 0
RECTAL PAIN: 0
FREQUENCY: 0
PALPITATIONS: 0
NAUSEA: 0
WEAKNESS: 0
DIARRHEA: 0
DIFFICULTY URINATING: 0

## 2025-06-26 NOTE — PROGRESS NOTES
"Subjective   Patient ID: Caitlin Hogan is a 64 y.o. female who presents for Follow-up (3 month /CSA needed).    Patient presents today for med check    Chronic cervical or back pain: Laminectomy in December with Dr. Graeme Friedman.  She has since been back to see Dr. Friedman and is possibly getting a fusion with him.  She is worried about the cost of the procedure and not having full resolution of pain.  Goal is resumption of of ADLs.  Is unable to be pain-free as sitting for long periods of time because her pain and she cannot get rest.  Using Norco for pain relief.  She does have an additional prescription for baclofen and Lyrica which she uses infrequently and does not get regular refills of.  New CSA was obtained today.  New UDS ordered.  She was unable to urinate in the office.    Insomnia: Secondary to pain.  Ambien helps.  She is able to sleep for approximately 5 hours without disturbance.  No difficulty with excess somnolence.       Review of Systems   Constitutional:  Negative for chills, diaphoresis, fatigue and unexpected weight change.   HENT:  Negative for dental problem, tinnitus and trouble swallowing.    Eyes:  Negative for visual disturbance.   Respiratory:  Negative for chest tightness and shortness of breath.    Cardiovascular:  Negative for chest pain, palpitations and leg swelling.   Gastrointestinal:  Negative for abdominal pain, constipation, diarrhea, nausea and rectal pain.   Endocrine: Negative for polydipsia, polyphagia and polyuria.   Genitourinary:  Negative for difficulty urinating, frequency and urgency.   Musculoskeletal:  Positive for arthralgias, back pain and myalgias.   Skin:  Negative for pallor and wound.   Neurological:  Negative for syncope, weakness, numbness and headaches.   Psychiatric/Behavioral:  Negative for suicidal ideas. The patient is not nervous/anxious.        Objective   /82   Pulse 86   Ht 1.6 m (5' 3\")   Wt 59.6 kg (131 lb 4.8 oz)   SpO2 98%   BMI " 23.26 kg/m²     Physical Exam  Vitals and nursing note reviewed.   Constitutional:       General: She is not in acute distress.     Appearance: Normal appearance.   HENT:      Head: Normocephalic.      Nose: Nose normal.      Mouth/Throat:      Mouth: Mucous membranes are moist.      Pharynx: Oropharynx is clear.   Eyes:      General: No scleral icterus.     Pupils: Pupils are equal, round, and reactive to light.   Neck:      Vascular: No carotid bruit.   Cardiovascular:      Rate and Rhythm: Normal rate and regular rhythm.      Pulses: Normal pulses.      Heart sounds: Normal heart sounds. No murmur heard.  Pulmonary:      Effort: Pulmonary effort is normal. No respiratory distress.      Breath sounds: Normal breath sounds. No stridor. No wheezing, rhonchi or rales.   Abdominal:      General: Bowel sounds are normal. There is no distension.      Palpations: Abdomen is soft.      Tenderness: There is no abdominal tenderness. There is no right CVA tenderness or left CVA tenderness.   Musculoskeletal:         General: No swelling. Normal range of motion.      Cervical back: Normal range of motion.      Right lower leg: No edema.      Left lower leg: No edema.   Skin:     General: Skin is warm and dry.      Capillary Refill: Capillary refill takes less than 2 seconds.   Neurological:      General: No focal deficit present.      Mental Status: She is alert and oriented to person, place, and time. Mental status is at baseline.   Psychiatric:         Mood and Affect: Mood normal.         Behavior: Behavior normal.         Thought Content: Thought content normal.         Judgment: Judgment normal.         Assessment/Plan   Diagnoses and all orders for this visit:  Controlled substance agreement signed  -     DRUG SCREEN,URINE; Future

## 2025-06-27 DIAGNOSIS — E03.9 ACQUIRED HYPOTHYROIDISM: Primary | ICD-10-CM

## 2025-07-01 LAB
AMPHETAMINES UR QL: NEGATIVE NG/ML
BARBITURATES UR QL: NEGATIVE NG/ML
BENZODIAZ UR QL: NEGATIVE NG/ML
BZE UR QL: NEGATIVE NG/ML
CREAT UR-MCNC: 40.7 MG/DL
FENTANYL UR QL SCN: NEGATIVE NG/ML
METHADONE UR QL: NEGATIVE NG/ML
OPIATES UR QL: POSITIVE NG/ML
OXIDANTS UR QL: NEGATIVE MCG/ML
OXYCODONE UR QL: NEGATIVE NG/ML
PH UR: 7.6 [PH] (ref 4.5–9)
QUEST NOTES AND COMMENTS: ABNORMAL
THC UR QL: NEGATIVE NG/ML

## 2025-07-09 ENCOUNTER — PATIENT MESSAGE (OUTPATIENT)
Dept: PRIMARY CARE | Facility: CLINIC | Age: 65
End: 2025-07-09
Payer: COMMERCIAL

## 2025-07-09 DIAGNOSIS — G47.00 INSOMNIA, UNSPECIFIED TYPE: ICD-10-CM

## 2025-07-09 RX ORDER — ZOLPIDEM TARTRATE 5 MG/1
5 TABLET ORAL NIGHTLY PRN
Qty: 30 TABLET | Refills: 0 | Status: SHIPPED | OUTPATIENT
Start: 2025-07-09 | End: 2025-08-08

## 2025-07-17 DIAGNOSIS — M54.2 CERVICALGIA: ICD-10-CM

## 2025-07-17 RX ORDER — HYDROCODONE BITARTRATE AND ACETAMINOPHEN 5; 325 MG/1; MG/1
1 TABLET ORAL 3 TIMES DAILY
Qty: 90 TABLET | Refills: 0 | Status: SHIPPED | OUTPATIENT
Start: 2025-07-17

## 2025-08-07 DIAGNOSIS — G47.00 INSOMNIA, UNSPECIFIED TYPE: ICD-10-CM

## 2025-08-07 RX ORDER — ZOLPIDEM TARTRATE 5 MG/1
5 TABLET ORAL NIGHTLY PRN
Qty: 30 TABLET | Refills: 0 | Status: SHIPPED | OUTPATIENT
Start: 2025-08-07 | End: 2025-09-06

## 2025-08-15 ENCOUNTER — PATIENT MESSAGE (OUTPATIENT)
Dept: PRIMARY CARE | Facility: CLINIC | Age: 65
End: 2025-08-15
Payer: COMMERCIAL

## 2025-08-15 DIAGNOSIS — M54.2 CERVICALGIA: ICD-10-CM

## 2025-08-15 RX ORDER — HYDROCODONE BITARTRATE AND ACETAMINOPHEN 5; 325 MG/1; MG/1
1 TABLET ORAL 3 TIMES DAILY
Qty: 90 TABLET | Refills: 0 | Status: SHIPPED | OUTPATIENT
Start: 2025-08-15

## 2025-09-05 ENCOUNTER — PATIENT MESSAGE (OUTPATIENT)
Dept: PRIMARY CARE | Facility: CLINIC | Age: 65
End: 2025-09-05
Payer: COMMERCIAL

## 2025-09-05 DIAGNOSIS — G47.00 INSOMNIA, UNSPECIFIED TYPE: ICD-10-CM

## 2025-09-05 RX ORDER — ZOLPIDEM TARTRATE 5 MG/1
5 TABLET ORAL NIGHTLY PRN
Qty: 30 TABLET | Refills: 0 | Status: SHIPPED | OUTPATIENT
Start: 2025-09-05 | End: 2025-10-05

## 2025-09-30 ENCOUNTER — APPOINTMENT (OUTPATIENT)
Dept: PRIMARY CARE | Facility: CLINIC | Age: 65
End: 2025-09-30
Payer: COMMERCIAL

## (undated) DEVICE — Device

## (undated) DEVICE — PIN, SKULL, MAYFIELD, ADULT

## (undated) DEVICE — SEALANT, HEMOSTATIC, FLOSEAL, 10 ML

## (undated) DEVICE — CAUTERY, PENCIL, PUSH BUTTON, SMOKE EVAC, 70MM

## (undated) DEVICE — SEALER, BIPOLAR, AQUA MANTYS 6.0

## (undated) DEVICE — SUTURE, SILK, 2-0, 18 IN, FSL, BLACK

## (undated) DEVICE — SUTURE, PROLENE, 6-0, 24 IN, BV1, DA, BLUE

## (undated) DEVICE — CLOSURE SYSTEM, DERMABOND, PRINEO, 22CM, STERILE

## (undated) DEVICE — OINTMENT, TOPICAL, BACITRACIN

## (undated) DEVICE — GLOVE, SURGICAL, PROTEXIS PI MICRO, 8.5, PF, LF

## (undated) DEVICE — TOWEL PACK, STERILE, 4/PACK, BLUE

## (undated) DEVICE — RESERVOIR, WOUND, W/TROCAR, PVC, MEDIUM, 400CC, DAVOL, 1/8 IN, 10FR

## (undated) DEVICE — SUTURE, VICRYL, 0, 18 IN, CT-1, UNDYED

## (undated) DEVICE — TRAY, SURESTEP, SILICONE DRAINAGE BAG, STATLOCK, 16FR

## (undated) DEVICE — SUTURE, POLYSORB 2/0  36  UNDYED  GS-21  209P"

## (undated) DEVICE — DRAPE, LAPAROTOMY II, PEDIATRIC

## (undated) DEVICE — SUTURE, STRATAFIX, SPIRAL MONOCRYL PLUS, 3-0, PS-2 45CM, UNDYED

## (undated) DEVICE — BUR, 3MM X 3.8MM, PRECISION, NEURO DRILL

## (undated) DEVICE — ELECTRODE, ELECTROSURGICAL, BLADE EXT 4 INCH, INSULATED

## (undated) DEVICE — APPLICATOR, CHLORAPREP, W/ORANGE TINT, 26ML